# Patient Record
Sex: FEMALE | Race: WHITE | NOT HISPANIC OR LATINO | Employment: FULL TIME | URBAN - METROPOLITAN AREA
[De-identification: names, ages, dates, MRNs, and addresses within clinical notes are randomized per-mention and may not be internally consistent; named-entity substitution may affect disease eponyms.]

---

## 2018-03-19 DIAGNOSIS — Z12.31 ENCOUNTER FOR SCREENING MAMMOGRAM FOR MALIGNANT NEOPLASM OF BREAST: Primary | ICD-10-CM

## 2018-03-20 ENCOUNTER — OFFICE VISIT (OUTPATIENT)
Dept: GYNECOLOGY | Facility: CLINIC | Age: 43
End: 2018-03-20
Payer: COMMERCIAL

## 2018-03-20 VITALS
BODY MASS INDEX: 31.39 KG/M2 | WEIGHT: 188.4 LBS | DIASTOLIC BLOOD PRESSURE: 90 MMHG | HEIGHT: 65 IN | SYSTOLIC BLOOD PRESSURE: 160 MMHG

## 2018-03-20 DIAGNOSIS — Z01.419 ENCOUNTER FOR ROUTINE GYNECOLOGICAL EXAMINATION WITH PAPANICOLAOU SMEAR OF CERVIX: Primary | ICD-10-CM

## 2018-03-20 DIAGNOSIS — N92.6 IRREGULAR MENSES: ICD-10-CM

## 2018-03-20 DIAGNOSIS — Z12.4 ENCOUNTER FOR PAPANICOLAOU SMEAR FOR CERVICAL CANCER SCREENING: ICD-10-CM

## 2018-03-20 PROCEDURE — G0143 SCR C/V CYTO,THINLAYER,RESCR: HCPCS | Performed by: NURSE PRACTITIONER

## 2018-03-20 PROCEDURE — 99396 PREV VISIT EST AGE 40-64: CPT | Performed by: NURSE PRACTITIONER

## 2018-03-20 PROCEDURE — 87624 HPV HI-RISK TYP POOLED RSLT: CPT | Performed by: NURSE PRACTITIONER

## 2018-03-20 NOTE — PROGRESS NOTES
Areli Polanco is a 37 y o  female who presents for her annual exam  Periods are irregular, lasting various amount of days  Dysmenorrhea:none  Cyclic symptoms include none  No intermenstrual bleeding, spotting, or discharge  The pt  relates that over the past year her periods have been irregular again  She states that at times she will miss 2 months and then at other times she may bleed every 3 wks  She reports that usually her period is not heavy and she denies having any cramps  She states that her bleeding pattern is similar to what she experienced when she had a polyp in the past     Current contraception: condoms  History of abnormal Pap smear: no  Family history of breast cancer: yes   No past medical history on file  No family history on file  Past Surgical History:   Procedure Laterality Date    HYSTEROSCOPY W/ POLYPECTOMY      TOOTH EXTRACTION       Social History     Social History    Marital status: /Civil Union     Spouse name: N/A    Number of children: N/A    Years of education: N/A     Occupational History    Not on file  Social History Main Topics    Smoking status: Never Smoker    Smokeless tobacco: Never Used    Alcohol use Yes      Comment: SOCIAL    Drug use: No    Sexual activity: Yes     Partners: Male     Birth control/ protection: Condom     Other Topics Concern    Not on file     Social History Narrative    No narrative on file     No current outpatient prescriptions on file        Review of Systems  Constitutional :no fever, feels well, no tiredness, no recent weight gain or loss  Cardiovascular: no complaints of slow or fast heart beat, no chest pain, no palpitations  Respiratory: no complaints of shortness of shortness of breath, no COSTELLO  Breasts:no complaints of breast pain, breast lump, or nipple discharge  Gastrointestinal: no complaints of abdominal pain, constipation,nause, vomiting, or diarrhea or bloody stools  Genitourinary : no complaints of dysuria, incontinence, pelvic pain, dysmenorrhea,vaginal discharge  Reports irregular menses  Integumentary: no complaints of skin rash or lesion,itching or dry skin  Neurological: no complaints of headache,numbness, tingling, dizziness or fainting       Objective      /90 (BP Location: Right arm, Patient Position: Sitting, Cuff Size: Standard)   Ht 5' 4 67" (1 642 m)   Wt 85 5 kg (188 lb 6 4 oz)   LMP 01/11/2018   BMI 31 68 kg/m²     General:   appears stated age","cooperative","alert" normal mood and affect   Neck: Neck: normal, supple,trachea midline, no masses   Heart: regular rate and rhythm, S1, S2 normal, no murmur, click, rub or gallop   Lungs: clear to auscultation bilaterally   Breasts: Breast exam :normal, no dimpling or skin changes noted   Abdomen: soft, non-tender, without masses or organomegaly   Vulva: Normal , no lesions   Vagina: normal , no lesions or dryness   Urethra: normal   Cervix: Normal, no palpable masses A pap smear with HPV was done   Uterus: Normal , non-tender,not enlarged,no palpable masses   Adnexa: Normal, non-tender without fullness or masses                         Assessment   Normal GYN exam  Irregular menses       Plan      All questions answered  Await pap smear results  Breast self exam technique reviewed and patient encouraged to perform self-exam monthly  Dietary diary  Mammogram   Thin prep Pap smear  We discussed her irregular menses and possible causes including another polyp  She has been advised to RTO for further evaluation with a TVS/EMB/SIS and she is agreeable to this

## 2018-03-22 LAB — HPV RRNA GENITAL QL NAA+PROBE: NORMAL

## 2018-03-23 LAB
LAB AP GYN PRIMARY INTERPRETATION: NORMAL
Lab: NORMAL

## 2018-04-18 ENCOUNTER — ULTRASOUND (OUTPATIENT)
Dept: GYNECOLOGY | Facility: CLINIC | Age: 43
End: 2018-04-18
Payer: COMMERCIAL

## 2018-04-18 ENCOUNTER — OFFICE VISIT (OUTPATIENT)
Dept: GYNECOLOGY | Facility: CLINIC | Age: 43
End: 2018-04-18
Payer: COMMERCIAL

## 2018-04-18 DIAGNOSIS — N93.9 ABNORMAL UTERINE BLEEDING: Primary | ICD-10-CM

## 2018-04-18 LAB — SL AMB POCT URINE HCG: NORMAL

## 2018-04-18 PROCEDURE — 81025 URINE PREGNANCY TEST: CPT

## 2018-04-18 PROCEDURE — 88305 TISSUE EXAM BY PATHOLOGIST: CPT | Performed by: PATHOLOGY

## 2018-04-18 PROCEDURE — 76830 TRANSVAGINAL US NON-OB: CPT

## 2018-04-18 PROCEDURE — 58100 BIOPSY OF UTERUS LINING: CPT | Performed by: NURSE PRACTITIONER

## 2018-04-18 PROCEDURE — 58340 CATHETER FOR HYSTEROGRAPHY: CPT | Performed by: NURSE PRACTITIONER

## 2018-04-18 NOTE — PROGRESS NOTES
Assessment/Plan:    AUB     Diagnoses and all orders for this visit: TVS/EMB/SIS done   Polypectomy Surgery scheduled    Abnormal uterine bleeding        Subjective: The pt  presents today for a TVS/EMB/SIS     Patient ID: Hailey Vergara is a 37 y o  female  HPI The pt presents today for further evaluation of her irregular menses  The pt  Was recently seen for her yearly exam  At that visit she stated that she was again experiencing irregular periods similar to na episode she had a few years ago when she had a polyp  (see note of 3/20/18)  She was advised to RTO for further evaluation and to have a TVS/EMB/SIS  The following portions of the patient's history were reviewed and updated as appropriate: current medications, past medical history, past social history and past surgical history  Review of Systems   Constitutional: Negative  Genitourinary: Positive for menstrual problem and vaginal bleeding  Negative for vaginal discharge and vaginal pain  Psychiatric/Behavioral: Negative  Objective: BP : 160/90   Ht  : 5'4 6 Wt : 188 5   LMP:  3/29             Physical Exam   Constitutional: She is oriented to person, place, and time  She appears well-developed and well-nourished  Genitourinary:   Genitourinary Comments: See TVS/EMB/SIS report   Neurological: She is alert and oriented to person, place, and time  Psychiatric: She has a normal mood and affect  Her behavior is normal      Endometrial biopsy  Date/Time: 4/18/2018 3:57 PM  Performed by: Farhan Arenas  Authorized by: Farhan Arenas     Consent:     Consent obtained:  Verbal and written    Consent given by:  Patient    Procedural risks discussed:  Bleeding, failure rate, infection and repeat procedure    Patient questions answered: yes      Patient agrees, verbalizes understanding, and wants to proceed: yes      Instructions and paperwork completed: yes    Indication:     Indications:  Other disorder of menstruation and other abnormal bleeding from female genital tract    Pre-procedure:     Premeds:  Ibuprofen (600 mg )  Procedure:     Procedure: endometrial biopsy with Pipelle      A bivalve speculum was placed in the vagina: yes      Cervix cleaned and prepped: yes      The cervix was dilated: no      Uterus sounded: yes      Uterus sound depth (cm):  7    Specimen collected: specimen collected and sent to pathology      Patient tolerated procedure well with no complications: yes    Comments: The SIS was done and did show an endometrial polyp  PLAN: We discussed her AUB issues and today's SIS findngs which confirm the presence of an endometrial polyp  She is aware that this will need to be removed  Surgery (polypectomy) has been scheduled  She is aware that the EMB results is pending

## 2018-08-10 ENCOUNTER — PROCEDURE VISIT (OUTPATIENT)
Dept: GYNECOLOGY | Facility: CLINIC | Age: 43
End: 2018-08-10
Payer: COMMERCIAL

## 2018-08-10 VITALS
BODY MASS INDEX: 30.99 KG/M2 | DIASTOLIC BLOOD PRESSURE: 89 MMHG | SYSTOLIC BLOOD PRESSURE: 190 MMHG | WEIGHT: 186 LBS | HEIGHT: 65 IN

## 2018-08-10 DIAGNOSIS — N93.9 ABNORMAL UTERINE BLEEDING (AUB): Primary | ICD-10-CM

## 2018-08-10 DIAGNOSIS — N84.0 ENDOMETRIAL POLYP: ICD-10-CM

## 2018-08-10 PROCEDURE — 88305 TISSUE EXAM BY PATHOLOGIST: CPT | Performed by: PATHOLOGY

## 2018-08-10 PROCEDURE — 58558 HYSTEROSCOPY BIOPSY: CPT | Performed by: OBSTETRICS & GYNECOLOGY

## 2018-08-10 RX ORDER — IBUPROFEN 200 MG
200 TABLET ORAL 2 TIMES DAILY
COMMUNITY

## 2018-08-10 NOTE — PROGRESS NOTES
Procedures:    Preoperative diagnosis:  Abnormal uterine bleeding    Postoperative diagnosis:  Same    Procedure:  Hysteroscopy D&C polypectomy    Patient was taken to the operating suite and was placed in the dorsal lithotomy position with her legs in stirrups  Patient was placed under IV sedation  Exam under anesthesia revealed an anteverted uterus normal size and contour and freely mobile with no adnexal masses  A weighted speculum was placed in the vagina the anterior lip the cervix was grasped with single-tooth tenaculum  Prior to doing this the perineum and vagina were prepared with Betadine and draped in usual sterile manner  The uterus was sounded to 7 cm  The cervix was dilated using Hegar dilators  The hysteroscope was inserted into the endometrial cavity  There was noted be polypoid tissue in the posterior right lateral aspect of the uterine cavity  The remainder of  the uterine cavity appeared normal    Both ostia were well visualized  The hysteroscope was removed  A medium sharp curette was introduced into the endometrial cavity and endometrial curettings were obtained along with the polypoid tissue    Patient tolerated procedure well and was sent to the recovery area

## 2018-08-22 ENCOUNTER — OFFICE VISIT (OUTPATIENT)
Dept: GYNECOLOGY | Facility: CLINIC | Age: 43
End: 2018-08-22

## 2018-08-22 VITALS
BODY MASS INDEX: 30.99 KG/M2 | DIASTOLIC BLOOD PRESSURE: 76 MMHG | HEIGHT: 65 IN | WEIGHT: 186 LBS | SYSTOLIC BLOOD PRESSURE: 118 MMHG

## 2018-08-22 DIAGNOSIS — Z48.89 POSTOPERATIVE VISIT: Primary | ICD-10-CM

## 2018-08-22 PROCEDURE — 99024 POSTOP FOLLOW-UP VISIT: CPT | Performed by: OBSTETRICS & GYNECOLOGY

## 2018-08-22 NOTE — PROGRESS NOTES
Here for PO check s/p hysteroscopy D & C polypectomy  No complaints  Path: benign polyp    PE: uterus A/V normal size and contour, non tender   No adenxal masses    Normal PO check  Plan: RTO prn/annual

## 2018-10-11 ENCOUNTER — OFFICE VISIT (OUTPATIENT)
Dept: FAMILY MEDICINE CLINIC | Facility: CLINIC | Age: 43
End: 2018-10-11
Payer: COMMERCIAL

## 2018-10-11 ENCOUNTER — HOSPITAL ENCOUNTER (OUTPATIENT)
Dept: ULTRASOUND IMAGING | Facility: HOSPITAL | Age: 43
Discharge: HOME | End: 2018-10-11
Payer: COMMERCIAL

## 2018-10-11 VITALS
HEIGHT: 65 IN | WEIGHT: 183.06 LBS | RESPIRATION RATE: 17 BRPM | OXYGEN SATURATION: 99 % | SYSTOLIC BLOOD PRESSURE: 130 MMHG | DIASTOLIC BLOOD PRESSURE: 80 MMHG | BODY MASS INDEX: 30.5 KG/M2 | HEART RATE: 66 BPM | TEMPERATURE: 97.5 F

## 2018-10-11 DIAGNOSIS — M25.571 PAIN AND SWELLING OF ANKLE, RIGHT: ICD-10-CM

## 2018-10-11 DIAGNOSIS — M25.471 PAIN AND SWELLING OF ANKLE, RIGHT: ICD-10-CM

## 2018-10-11 DIAGNOSIS — M25.471 PAIN AND SWELLING OF ANKLE, RIGHT: Primary | ICD-10-CM

## 2018-10-11 DIAGNOSIS — M25.571 PAIN AND SWELLING OF ANKLE, RIGHT: Primary | ICD-10-CM

## 2018-10-11 PROCEDURE — 93971 EXTREMITY STUDY: CPT

## 2018-10-11 PROCEDURE — 99214 OFFICE O/P EST MOD 30 MIN: CPT | Performed by: FAMILY MEDICINE

## 2018-10-11 PROCEDURE — 93971 EXTREMITY STUDY: CPT | Performed by: SURGERY

## 2018-10-11 RX ORDER — SULFAMETHOXAZOLE AND TRIMETHOPRIM 800; 160 MG/1; MG/1
1 TABLET ORAL EVERY 12 HOURS SCHEDULED
Qty: 14 TABLET | Refills: 0 | Status: SHIPPED | OUTPATIENT
Start: 2018-10-11 | End: 2018-10-19 | Stop reason: ALTCHOICE

## 2018-10-11 NOTE — PROGRESS NOTES
Assessment/Plan:   1  Pain and swelling of ankle, right  Unclear as to the exact etiology of patient's symptoms today  She was educated on the differential possible causes including possible venous thromboembolism verses cellulitis  Will check stat venous duplex to rule out gross clot dot she was given a prescription for Bactrim to take as well  If any symptoms are worsening, she was advised to follow up immediately  - VAS lower limb venous duplex study, unilateral/limited; Future  - sulfamethoxazole-trimethoprim (BACTRIM DS) 800-160 mg per tablet; Take 1 tablet by mouth every 12 (twelve) hours for 7 days  Dispense: 14 tablet; Refill: 0     There are no diagnoses linked to this encounter  Subjective:    Chief Complaint   Patient presents with    Swelling, Painful and Redness     on R leg x 3 days  Has taken Ibuprofen with little relief  Pt has cc of tenderness and warm to the touch        Patient ID: La Nena Winslow is a 37 y o  female  Patient is a 47-year-old female presents today with CC of right ankle redness and swelling  She noticed this gradually  She states that she does have mild pain in this area  Denies any trauma to this area  She also denies any long flights or car trips recently  She states that she does exercise regularly  Review of Systems   Constitutional: Negative for activity change, chills, fatigue and fever  HENT: Negative for congestion, ear pain, sinus pressure and sore throat  Eyes: Negative for redness, itching and visual disturbance  Respiratory: Negative for cough and shortness of breath  Cardiovascular: Negative for chest pain and palpitations  Gastrointestinal: Negative for abdominal pain, diarrhea and nausea  Endocrine: Negative for cold intolerance and heat intolerance  Genitourinary: Negative for dysuria, flank pain and frequency  Musculoskeletal: Negative for arthralgias, back pain, gait problem and myalgias     Skin: Negative for color change  Allergic/Immunologic: Negative for environmental allergies  Neurological: Negative for dizziness, numbness and headaches  Psychiatric/Behavioral: Negative for behavioral problems and sleep disturbance  The following portions of the patient's history were reviewed and updated as appropriate : past family history, past medical history, past social history and past surgical history  Current Outpatient Prescriptions:     ibuprofen (MOTRIN) 200 mg tablet, Take by mouth every 6 (six) hours as needed for mild pain, Disp: , Rfl:     Objective:    Vitals:    10/11/18 1421   BP: 130/80   BP Location: Left arm   Patient Position: Sitting   Cuff Size: Large   Pulse: 66   Resp: 17   Temp: 97 5 °F (36 4 °C)   TempSrc: Tympanic   SpO2: 99%   Weight: 83 kg (183 lb 1 oz)   Height: 5' 4 96" (1 65 m)        Physical Exam   Constitutional: She is oriented to person, place, and time  She appears well-developed and well-nourished  HENT:   Head: Normocephalic and atraumatic  Nose: Nose normal    Mouth/Throat: No oropharyngeal exudate  Eyes: Pupils are equal, round, and reactive to light  Right eye exhibits no discharge  Left eye exhibits no discharge  Neck: Normal range of motion  Neck supple  No tracheal deviation present  Cardiovascular: Normal rate, regular rhythm and intact distal pulses  Exam reveals no gallop and no friction rub  No murmur heard  Pulses:       Dorsalis pedis pulses are 2+ on the right side, and 2+ on the left side  Posterior tibial pulses are 2+ on the right side, and 2+ on the left side  Pulmonary/Chest: Effort normal and breath sounds normal  No respiratory distress  She has no wheezes  She has no rales  Abdominal: Soft  Bowel sounds are normal  She exhibits no distension  There is no tenderness  There is no rebound and no guarding  Musculoskeletal: Normal range of motion  She exhibits no edema     Lymphadenopathy:        Head (right side): No submental and no submandibular adenopathy present  Head (left side): No submental and no submandibular adenopathy present  She has no cervical adenopathy  Right cervical: No superficial cervical, no deep cervical and no posterior cervical adenopathy present  Left cervical: No superficial cervical, no deep cervical and no posterior cervical adenopathy present  Neurological: She is alert and oriented to person, place, and time  No cranial nerve deficit or sensory deficit  Skin: Skin is warm, dry and intact  Psychiatric: Her speech is normal and behavior is normal  Judgment normal  Her mood appears not anxious  Cognition and memory are normal  She does not exhibit a depressed mood  Vitals reviewed

## 2018-10-19 ENCOUNTER — OFFICE VISIT (OUTPATIENT)
Dept: FAMILY MEDICINE CLINIC | Facility: CLINIC | Age: 43
End: 2018-10-19
Payer: COMMERCIAL

## 2018-10-19 VITALS
RESPIRATION RATE: 16 BRPM | HEIGHT: 64 IN | BODY MASS INDEX: 31.07 KG/M2 | HEART RATE: 58 BPM | TEMPERATURE: 98.7 F | SYSTOLIC BLOOD PRESSURE: 158 MMHG | WEIGHT: 182 LBS | OXYGEN SATURATION: 99 % | DIASTOLIC BLOOD PRESSURE: 88 MMHG

## 2018-10-19 DIAGNOSIS — L03.115 CELLULITIS OF RIGHT LOWER LEG: Primary | ICD-10-CM

## 2018-10-19 PROCEDURE — 3008F BODY MASS INDEX DOCD: CPT | Performed by: PHYSICIAN ASSISTANT

## 2018-10-19 PROCEDURE — 99213 OFFICE O/P EST LOW 20 MIN: CPT | Performed by: PHYSICIAN ASSISTANT

## 2018-10-19 PROCEDURE — 1036F TOBACCO NON-USER: CPT | Performed by: PHYSICIAN ASSISTANT

## 2018-10-19 RX ORDER — SULFAMETHOXAZOLE AND TRIMETHOPRIM 800; 160 MG/1; MG/1
1 TABLET ORAL EVERY 12 HOURS SCHEDULED
Qty: 20 TABLET | Refills: 0 | Status: SHIPPED | OUTPATIENT
Start: 2018-10-19 | End: 2018-10-29

## 2018-10-23 NOTE — PROGRESS NOTES
Assessment/Plan:         Diagnoses and all orders for this visit:    Cellulitis of right lower leg  -     sulfamethoxazole-trimethoprim (BACTRIM DS) 800-160 mg per tablet; Take 1 tablet by mouth every 12 (twelve) hours for 10 days Take with food  Discussed condition with pt  Her RLE cellulitis appears improved but not resolved  I will extend her oral abx course for another week and rec continued elevation, warm compresses  To F/U if condition does not resolve  Chief Complaint   Patient presents with    Follow-up     R leg still a little swollen finished abx  Subjective:      Patient ID: Cathie Long is a 37 y o  female  Pt presents for F/U from visit on 10/11/18  She was diagnosed with cellulitis of the right lower leg and was prescribed a 7 day course of Bactrim DS  She has completed it but is concerned that her condition has not completely resolved  She still reports a localized area of redness, mild swelling, and tenderness  Denies fever, chills, or other symptoms  The following portions of the patient's history were reviewed and updated as appropriate: allergies, current medications, past family history, past medical history, past social history, past surgical history and problem list     Review of Systems   Constitutional: Negative  Respiratory: Negative  Cardiovascular: Negative  Gastrointestinal: Negative  Genitourinary: Negative  Skin: Positive for color change (Localized redness, swelling, tenderness right lower leg above ankle)  Objective:      /88 (BP Location: Left arm, Patient Position: Sitting, Cuff Size: Adult)   Pulse 58   Temp 98 7 °F (37 1 °C) (Temporal)   Resp 16   Ht 5' 4" (1 626 m)   Wt 82 6 kg (182 lb)   LMP 09/27/2018   SpO2 99%   BMI 31 24 kg/m²          Physical Exam   Constitutional: She is oriented to person, place, and time  She appears well-developed and well-nourished  No distress     Neurological: She is alert and oriented to person, place, and time  Skin: There is erythema (Right anterior lower leg superior to the ankle with localized faint erythema, mild STS, and TTP  Margins are poorly demarcated  )  Psychiatric: She has a normal mood and affect  Vitals reviewed

## 2019-04-10 ENCOUNTER — OFFICE VISIT (OUTPATIENT)
Dept: GYNECOLOGY | Facility: CLINIC | Age: 44
End: 2019-04-10
Payer: COMMERCIAL

## 2019-04-10 VITALS
RESPIRATION RATE: 17 BRPM | HEART RATE: 62 BPM | BODY MASS INDEX: 32.65 KG/M2 | DIASTOLIC BLOOD PRESSURE: 100 MMHG | SYSTOLIC BLOOD PRESSURE: 150 MMHG | TEMPERATURE: 96.6 F | WEIGHT: 196 LBS | HEIGHT: 65 IN

## 2019-04-10 DIAGNOSIS — Z01.411 ENCOUNTER FOR GYNECOLOGICAL EXAMINATION (GENERAL) (ROUTINE) WITH ABNORMAL FINDINGS: Primary | ICD-10-CM

## 2019-04-10 DIAGNOSIS — Z12.31 ENCOUNTER FOR SCREENING MAMMOGRAM FOR MALIGNANT NEOPLASM OF BREAST: ICD-10-CM

## 2019-04-10 DIAGNOSIS — N93.9 ABNORMAL UTERINE BLEEDING: ICD-10-CM

## 2019-04-10 PROCEDURE — 99396 PREV VISIT EST AGE 40-64: CPT | Performed by: OBSTETRICS & GYNECOLOGY

## 2019-04-10 RX ORDER — LORATADINE 10 MG/1
10 TABLET ORAL DAILY PRN
COMMUNITY

## 2019-04-13 ENCOUNTER — APPOINTMENT (OUTPATIENT)
Dept: LAB | Facility: MEDICAL CENTER | Age: 44
End: 2019-04-13
Payer: COMMERCIAL

## 2019-04-13 DIAGNOSIS — N93.9 ABNORMAL UTERINE BLEEDING: ICD-10-CM

## 2019-04-13 LAB
ERYTHROCYTE [DISTWIDTH] IN BLOOD BY AUTOMATED COUNT: 12.6 % (ref 11.6–15.1)
ESTRADIOL SERPL-MCNC: 12 PG/ML
FSH SERPL-ACNC: 72.8 MIU/ML
HCT VFR BLD AUTO: 37.8 % (ref 34.8–46.1)
HGB BLD-MCNC: 12.2 G/DL (ref 11.5–15.4)
INSULIN SERPL-ACNC: 3 MU/L (ref 3–25)
LH SERPL-ACNC: 35.1 MIU/ML
MCH RBC QN AUTO: 31 PG (ref 26.8–34.3)
MCHC RBC AUTO-ENTMCNC: 32.3 G/DL (ref 31.4–37.4)
MCV RBC AUTO: 96 FL (ref 82–98)
PLATELET # BLD AUTO: 274 THOUSANDS/UL (ref 149–390)
PMV BLD AUTO: 10.2 FL (ref 8.9–12.7)
PROLACTIN SERPL-MCNC: 16.9 NG/ML
RBC # BLD AUTO: 3.94 MILLION/UL (ref 3.81–5.12)
TSH SERPL DL<=0.05 MIU/L-ACNC: 1.87 UIU/ML (ref 0.36–3.74)
WBC # BLD AUTO: 7.32 THOUSAND/UL (ref 4.31–10.16)

## 2019-04-13 PROCEDURE — 84146 ASSAY OF PROLACTIN: CPT

## 2019-04-13 PROCEDURE — 84403 ASSAY OF TOTAL TESTOSTERONE: CPT

## 2019-04-13 PROCEDURE — 85027 COMPLETE CBC AUTOMATED: CPT

## 2019-04-13 PROCEDURE — 82670 ASSAY OF TOTAL ESTRADIOL: CPT

## 2019-04-13 PROCEDURE — 36415 COLL VENOUS BLD VENIPUNCTURE: CPT

## 2019-04-13 PROCEDURE — 83001 ASSAY OF GONADOTROPIN (FSH): CPT

## 2019-04-13 PROCEDURE — 83525 ASSAY OF INSULIN: CPT

## 2019-04-13 PROCEDURE — 84443 ASSAY THYROID STIM HORMONE: CPT

## 2019-04-13 PROCEDURE — 84402 ASSAY OF FREE TESTOSTERONE: CPT

## 2019-04-13 PROCEDURE — 83002 ASSAY OF GONADOTROPIN (LH): CPT

## 2019-04-16 ENCOUNTER — TELEPHONE (OUTPATIENT)
Dept: GYNECOLOGY | Facility: CLINIC | Age: 44
End: 2019-04-16

## 2019-04-16 LAB
TESTOST FREE SERPL-MCNC: 4 PG/ML (ref 0–4.2)
TESTOST SERPL-MCNC: 23 NG/DL (ref 8–48)

## 2019-04-17 ENCOUNTER — HOSPITAL ENCOUNTER (OUTPATIENT)
Dept: ULTRASOUND IMAGING | Facility: HOSPITAL | Age: 44
Discharge: HOME | End: 2019-04-17
Payer: COMMERCIAL

## 2019-04-17 ENCOUNTER — TELEPHONE (OUTPATIENT)
Dept: FAMILY MEDICINE CLINIC | Facility: CLINIC | Age: 44
End: 2019-04-17

## 2019-04-17 DIAGNOSIS — N93.9 ABNORMAL UTERINE BLEEDING: ICD-10-CM

## 2019-04-17 PROCEDURE — 76830 TRANSVAGINAL US NON-OB: CPT

## 2019-04-17 PROCEDURE — 76856 US EXAM PELVIC COMPLETE: CPT

## 2019-04-20 DIAGNOSIS — Z01.411 ENCOUNTER FOR GYNECOLOGICAL EXAMINATION (GENERAL) (ROUTINE) WITH ABNORMAL FINDINGS: ICD-10-CM

## 2019-04-24 ENCOUNTER — OFFICE VISIT (OUTPATIENT)
Dept: GYNECOLOGY | Facility: CLINIC | Age: 44
End: 2019-04-24
Payer: COMMERCIAL

## 2019-04-24 VITALS
WEIGHT: 196 LBS | BODY MASS INDEX: 32.62 KG/M2 | SYSTOLIC BLOOD PRESSURE: 150 MMHG | HEART RATE: 62 BPM | DIASTOLIC BLOOD PRESSURE: 100 MMHG

## 2019-04-24 DIAGNOSIS — N95.1 PERIMENOPAUSE: Primary | ICD-10-CM

## 2019-04-24 PROCEDURE — 99214 OFFICE O/P EST MOD 30 MIN: CPT | Performed by: OBSTETRICS & GYNECOLOGY

## 2019-04-25 ENCOUNTER — TELEPHONE (OUTPATIENT)
Dept: GYNECOLOGY | Facility: CLINIC | Age: 44
End: 2019-04-25

## 2020-06-01 ENCOUNTER — ANNUAL EXAM (OUTPATIENT)
Dept: GYNECOLOGY | Facility: CLINIC | Age: 45
End: 2020-06-01
Payer: COMMERCIAL

## 2020-06-01 VITALS
BODY MASS INDEX: 34.32 KG/M2 | HEIGHT: 65 IN | WEIGHT: 206 LBS | HEART RATE: 57 BPM | DIASTOLIC BLOOD PRESSURE: 92 MMHG | SYSTOLIC BLOOD PRESSURE: 142 MMHG

## 2020-06-01 DIAGNOSIS — N64.4 MASTODYNIA OF RIGHT BREAST: Primary | ICD-10-CM

## 2020-06-01 DIAGNOSIS — N64.4 MASTODYNIA OF RIGHT BREAST: ICD-10-CM

## 2020-06-01 DIAGNOSIS — Z01.411 ENCOUNTER FOR GYNECOLOGICAL EXAMINATION (GENERAL) (ROUTINE) WITH ABNORMAL FINDINGS: Primary | ICD-10-CM

## 2020-06-01 DIAGNOSIS — N63.10 LUMP OF RIGHT BREAST: ICD-10-CM

## 2020-06-01 DIAGNOSIS — Z12.31 ENCOUNTER FOR SCREENING MAMMOGRAM FOR MALIGNANT NEOPLASM OF BREAST: Primary | ICD-10-CM

## 2020-06-01 DIAGNOSIS — Z12.31 ENCOUNTER FOR SCREENING MAMMOGRAM FOR MALIGNANT NEOPLASM OF BREAST: ICD-10-CM

## 2020-06-01 PROCEDURE — S0612 ANNUAL GYNECOLOGICAL EXAMINA: HCPCS | Performed by: OBSTETRICS & GYNECOLOGY

## 2020-06-01 PROCEDURE — 3008F BODY MASS INDEX DOCD: CPT | Performed by: OBSTETRICS & GYNECOLOGY

## 2020-06-19 ENCOUNTER — HOSPITAL ENCOUNTER (OUTPATIENT)
Dept: ULTRASOUND IMAGING | Facility: CLINIC | Age: 45
Discharge: HOME | End: 2020-06-19
Payer: COMMERCIAL

## 2020-06-19 ENCOUNTER — HOSPITAL ENCOUNTER (OUTPATIENT)
Dept: MAMMOGRAPHY | Facility: CLINIC | Age: 45
Discharge: HOME | End: 2020-06-19
Payer: COMMERCIAL

## 2020-06-19 ENCOUNTER — TELEPHONE (OUTPATIENT)
Dept: GYNECOLOGY | Facility: CLINIC | Age: 45
End: 2020-06-19

## 2020-06-19 VITALS — BODY MASS INDEX: 35.17 KG/M2 | WEIGHT: 206 LBS | HEIGHT: 64 IN

## 2020-06-19 DIAGNOSIS — N63.10 LUMP OF RIGHT BREAST: ICD-10-CM

## 2020-06-19 DIAGNOSIS — N64.4 MASTODYNIA OF RIGHT BREAST: ICD-10-CM

## 2020-06-19 DIAGNOSIS — N64.4 MASTODYNIA: ICD-10-CM

## 2020-06-19 PROCEDURE — G0279 TOMOSYNTHESIS, MAMMO: HCPCS

## 2020-06-19 PROCEDURE — 77066 DX MAMMO INCL CAD BI: CPT

## 2020-06-19 PROCEDURE — 76642 ULTRASOUND BREAST LIMITED: CPT

## 2020-06-22 ENCOUNTER — TELEPHONE (OUTPATIENT)
Dept: GYNECOLOGY | Facility: CLINIC | Age: 45
End: 2020-06-22

## 2020-09-23 ENCOUNTER — TELEPHONE (OUTPATIENT)
Dept: GYNECOLOGY | Facility: CLINIC | Age: 45
End: 2020-09-23

## 2021-11-17 ENCOUNTER — ANNUAL EXAM (OUTPATIENT)
Dept: GYNECOLOGY | Facility: CLINIC | Age: 46
End: 2021-11-17
Payer: COMMERCIAL

## 2021-11-17 VITALS
SYSTOLIC BLOOD PRESSURE: 184 MMHG | HEIGHT: 64 IN | WEIGHT: 215.2 LBS | DIASTOLIC BLOOD PRESSURE: 102 MMHG | BODY MASS INDEX: 36.74 KG/M2

## 2021-11-17 DIAGNOSIS — Z12.31 SCREENING MAMMOGRAM FOR BREAST CANCER: ICD-10-CM

## 2021-11-17 DIAGNOSIS — R03.0 ELEVATED BP WITHOUT DIAGNOSIS OF HYPERTENSION: ICD-10-CM

## 2021-11-17 DIAGNOSIS — N32.81 OAB (OVERACTIVE BLADDER): ICD-10-CM

## 2021-11-17 DIAGNOSIS — Z01.411 ENCOUNTER FOR GYNECOLOGICAL EXAMINATION (GENERAL) (ROUTINE) WITH ABNORMAL FINDINGS: Primary | ICD-10-CM

## 2021-11-17 DIAGNOSIS — Z12.4 PAP SMEAR FOR CERVICAL CANCER SCREENING: ICD-10-CM

## 2021-11-17 PROCEDURE — S0612 ANNUAL GYNECOLOGICAL EXAMINA: HCPCS | Performed by: OBSTETRICS & GYNECOLOGY

## 2021-11-17 PROCEDURE — G0476 HPV COMBO ASSAY CA SCREEN: HCPCS | Performed by: OBSTETRICS & GYNECOLOGY

## 2021-11-17 PROCEDURE — G0145 SCR C/V CYTO,THINLAYER,RESCR: HCPCS | Performed by: OBSTETRICS & GYNECOLOGY

## 2021-11-20 LAB
HPV HR 12 DNA CVX QL NAA+PROBE: NEGATIVE
HPV16 DNA CVX QL NAA+PROBE: NEGATIVE
HPV18 DNA CVX QL NAA+PROBE: NEGATIVE

## 2021-11-23 LAB
LAB AP GYN PRIMARY INTERPRETATION: NORMAL
Lab: NORMAL

## 2021-11-24 ENCOUNTER — DOCUMENTATION (OUTPATIENT)
Dept: GYNECOLOGY | Facility: CLINIC | Age: 46
End: 2021-11-24

## 2021-11-26 ENCOUNTER — HOSPITAL ENCOUNTER (OUTPATIENT)
Dept: RADIOLOGY | Facility: MEDICAL CENTER | Age: 46
Discharge: HOME | End: 2021-11-26
Payer: COMMERCIAL

## 2021-11-26 VITALS — WEIGHT: 215 LBS | HEIGHT: 64 IN | BODY MASS INDEX: 36.7 KG/M2

## 2021-11-26 DIAGNOSIS — Z12.31 SCREENING MAMMOGRAM FOR BREAST CANCER: ICD-10-CM

## 2021-11-26 PROCEDURE — 77063 BREAST TOMOSYNTHESIS BI: CPT

## 2021-11-26 PROCEDURE — 77067 SCR MAMMO BI INCL CAD: CPT

## 2022-02-10 ENCOUNTER — RA CDI HCC (OUTPATIENT)
Dept: OTHER | Facility: HOSPITAL | Age: 47
End: 2022-02-10

## 2022-02-10 NOTE — PROGRESS NOTES
[facility] 73  coding opportunities       Chart reviewed, no opportunity found: CHART REVIEWED, NO OPPORTUNITY FOUND               Patients insurance company: [employer] (Medicare Advantage and Commercial)

## 2022-02-14 NOTE — PROGRESS NOTES
Assessment/Plan:  Problem List Items Addressed This Visit        Respiratory    Seasonal allergic rhinitis     Stable on Claritin 10 mg daily p r n     Avoid decongestants  Cardiovascular and Mediastinum    Primary hypertension     New  Uncontrolled  Start Losartan 50mg QD  Check blood pressure outside of office  Recommend lifestyle modifications  Relevant Medications    losartan (COZAAR) 50 mg tablet    Other Relevant Orders    Echo complete w/ contrast if indicated    CBC and differential    Comprehensive metabolic panel       Genitourinary    OAB (overactive bladder)     Stable without meds  Management per Gyne  Other    Class 2 severe obesity with serious comorbidity and body mass index (BMI) of 36 0 to 36 9 in [facility])     Worsening  Recommend lifestyle modifications  Relevant Orders    US right upper quadrant    POCT urine HCG (Completed)      Other Visit Diagnoses     Annual physical exam    -  Primary    RUQ abdominal pain        Relevant Orders    US right upper quadrant    POCT urine HCG (Completed) - Negative    R/O Gallbladder Pathology          Heart murmur        Relevant Orders    Echo complete w/ contrast if indicated    Screening for HIV (human immunodeficiency virus)        Relevant Orders    HIV 1/2 Antigen/Antibody (4th Generation) w Reflex [facility]    Need for hepatitis C screening test        Relevant Orders    Hepatitis C antibody    Screening for colorectal cancer        Relevant Orders    Cologuard    Screening for diabetes mellitus        Relevant Orders    Hemoglobin A1C    Screening for cardiovascular condition        Relevant Orders    CBC and differential    Comprehensive metabolic panel    Lipid panel    LDL cholesterol, direct    Obesity (BMI 35 0-39 9 without comorbidity)        Relevant Orders    US right upper quadrant    POCT urine HCG (Completed)    TSH, 3rd generation with Free T4 reflex    Vitamin D 25 hydroxy    Severe obesity (BMI 35 0-39  9) with comorbidity ([facility] [address] )        Relevant Orders    US right upper quadrant    POCT urine HCG (Completed)    TSH, 3rd generation with Free T4 reflex    Vitamin D 25 hydroxy    BMI 36 0-36 9,adult        Relevant Orders    US right upper quadrant    POCT urine HCG (Completed)    Vitamin D 25 hydroxy    Myalgia        Relevant Orders    Vitamin D 25 hydroxy           Return in about 6 weeks (around 3/29/2022) for F/U HTN, RUQ Abd Pain, Murmur, Labs  Future Appointments   Date Time Provider Ramiro Brianna   3/31/2022  8:00 AM Ida Rodrigez, DO [facility]        Subjective:     Tete Mayo is a 50 y o  female who presents today as a new patient for her medical conditions  New Patient    Previous PCP:  Dr Rajat Sr at [facility]  Reason for Transfer:  Location  Last seen by previous PCP:  10/19/18  Last Labs:  4/13/19  Last Physical:  [employer]  Medical Records Requested:  No      HPI:  Chief Complaint   Patient presents with    Physical Exam     EST CARE- HIGH BP READING    Medication Refill     NONE    Labs Only     NO LAB    HM     DECLINED TDAP, OK FOR HEP C HIV     -- Above per clinical staff and reviewed  --      HPI      Today:      Controlled Substance Review    [place] PDMP or [place]  reviewed: No red flags were identified; safe to proceed with prescription  Obesity - Watching diet  She not adding salt to food  Does intermittent fasting during the work week  +Exercise - Gym  -  / Weights / [employer] / Class for 1 hour, 6 times per week  HTN -  /102 on 11/1721 at [facility]  BP check outside of office on arm cuff 160's/70/s  Heart Murmur - [place] by Gyn at appt on 11/17/21  No Echo previously  OAB - Management per AYSE Shannon  Next appt 11/22  No meds  AR - Stable on Claritin PRN  Reviewed:  Labs 4/13/19, Gyn 11/17/21    Sees Gyn AYSE Shannon at [facility] yearly  Next appt   No CRC previously  Sees Dentist q6 months  Sees Optho q2 years  PHQ-2/9 Depression Screening    Little interest or pleasure in doing things: 0 - not at all  Feeling down, depressed, or hopeless: 1 - several days  Trouble falling or staying asleep, or sleeping too much: 0 - not at all  Feeling tired or having little energy: 0 - not at all  Poor appetite or overeatin - not at all  Feeling bad about yourself - or that you are a failure or have let yourself or your family down: 0 - not at all  Trouble concentrating on things, such as reading the newspaper or watching television: 0 - not at all  Moving or speaking so slowly that other people could have noticed  Or the opposite - being so fidgety or restless that you have been moving around a lot more than usual: 0 - not at all  Thoughts that you would be better off dead, or of hurting yourself in some way: 0 - not at all  PHQ-2 Score: 1  PHQ-2 Interpretation: Negative depression screen  PHQ-9 Score: 1   PHQ-9 Interpretation: No or Minimal depression            ANA-7 Flowsheet Screening      Most Recent Value   Over the last 2 weeks, how often have you been bothered by any of the following problems? Feeling nervous, anxious, or on edge 1   Not being able to stop or control worrying 0   Worrying too much about different things 0   Trouble relaxing 0   Being so restless that it is hard to sit still 0   Becoming easily annoyed or irritable 0   Feeling afraid as if something awful might happen 0   ANA-7 Total Score 1        MDQ:  0, Asynchronous, No Problem    The following portions of the patient's history were reviewed and updated as appropriate: allergies, current medications, past family history, past medical history, past social history, past surgical history and problem list       Review of Systems   Constitutional: Negative for appetite change, chills, diaphoresis, fatigue and fever     Respiratory: Negative for chest tightness and shortness of breath  Cardiovascular: Positive for palpitations (1-2 times per month)  Negative for chest pain  Gastrointestinal: Positive for abdominal pain (RUQ x 1 year)  Negative for blood in stool, diarrhea, nausea and vomiting  Genitourinary: Negative for dysuria  Current Outpatient Medications   Medication Sig Dispense Refill    ibuprofen (MOTRIN) 200 mg tablet Take 200 mg by mouth 2 (two) times a day        loratadine (CLARITIN) 10 mg tablet Take 10 mg by mouth daily as needed        losartan (COZAAR) 50 mg tablet Take 1 tablet (50 mg total) by mouth daily 30 tablet 1     No current facility-administered medications for this visit  Objective:  /88   Pulse 83   Temp 97 5 °F (36 4 °C)   Resp 16   Ht 5' 4 69" (1 643 m)   Wt 99 8 kg (220 lb)   LMP 09/24/2021 (Exact Date)   SpO2 100%   Breastfeeding No   BMI 36 97 kg/m²    Wt Readings from Last 3 Encounters:   02/15/22 99 8 kg (220 lb)   11/26/21 97 5 kg (215 lb)   11/17/21 97 6 kg (215 lb 3 2 oz)      BP Readings from Last 3 Encounters:   02/15/22 160/88   11/17/21 (!) 184/102   06/01/20 142/92          Physical Exam  Vitals and nursing note reviewed  Constitutional:       Appearance: Normal appearance  She is well-developed  She is obese  HENT:      Head: Normocephalic and atraumatic  Right Ear: Tympanic membrane, ear canal and external ear normal       Left Ear: Tympanic membrane, ear canal and external ear normal       Nose: Nose normal       Right Sinus: No maxillary sinus tenderness or frontal sinus tenderness  Left Sinus: No maxillary sinus tenderness or frontal sinus tenderness  Mouth/Throat:      Mouth: Mucous membranes are moist       Pharynx: Oropharynx is clear  Uvula midline  Tonsils: No tonsillar exudate  Eyes:      Extraocular Movements: Extraocular movements intact        Conjunctiva/sclera: Conjunctivae normal       Pupils: Pupils are equal, round, and reactive to light  Cardiovascular:      Rate and Rhythm: Normal rate and regular rhythm  Pulses: Normal pulses  Heart sounds: Murmur heard  Systolic murmur is present with a grade of 1/6  Pulmonary:      Effort: Pulmonary effort is normal       Breath sounds: Normal breath sounds  Abdominal:      General: Abdomen is flat  Bowel sounds are normal  There is no distension  Palpations: Abdomen is soft  There is no mass  Tenderness: There is abdominal tenderness in the right upper quadrant  There is no guarding or rebound  Positive signs include Ruiz's sign  Musculoskeletal:         General: No swelling or tenderness  Cervical back: Neck supple  Right lower leg: No edema  Left lower leg: No edema  Lymphadenopathy:      Cervical: No cervical adenopathy  Skin:     Findings: No rash  Neurological:      General: No focal deficit present  Mental Status: She is alert and oriented to person, place, and time  Psychiatric:         Attention and Perception: Attention and perception normal          Mood and Affect: Mood is anxious  Speech: Speech normal          Behavior: Behavior normal  Behavior is cooperative  Thought Content: Thought content normal          Cognition and Memory: Cognition and memory normal          Judgment: Judgment normal          Lab Results:      Lab Results   Component Value Date    WBC 7 32 04/13/2019    HGB 12 2 04/13/2019    HCT 37 8 04/13/2019     04/13/2019     No results found for: URICACID  Invalid input(s): BASENAME Vitamin D    No results found  POCT Labs      BMI Counseling: Body mass index is 36 97 kg/m²  The BMI is above normal  Nutrition recommendations include encouraging healthy choices of fruits and vegetables  Exercise recommendations include exercising 3-5 times per week  No pharmacotherapy was ordered  Rationale for BMI follow-up plan is due to patient being overweight or obese

## 2022-02-15 ENCOUNTER — OFFICE VISIT (OUTPATIENT)
Dept: FAMILY MEDICINE CLINIC | Facility: CLINIC | Age: 47
End: 2022-02-15
Payer: COMMERCIAL

## 2022-02-15 VITALS
WEIGHT: 220 LBS | RESPIRATION RATE: 16 BRPM | SYSTOLIC BLOOD PRESSURE: 160 MMHG | OXYGEN SATURATION: 100 % | BODY MASS INDEX: 36.65 KG/M2 | TEMPERATURE: 97.5 F | HEART RATE: 83 BPM | HEIGHT: 65 IN | DIASTOLIC BLOOD PRESSURE: 88 MMHG

## 2022-02-15 DIAGNOSIS — R01.1 HEART MURMUR: ICD-10-CM

## 2022-02-15 DIAGNOSIS — K76.0 FATTY LIVER: ICD-10-CM

## 2022-02-15 DIAGNOSIS — M79.10 MYALGIA: ICD-10-CM

## 2022-02-15 DIAGNOSIS — Z11.59 NEED FOR HEPATITIS C SCREENING TEST: ICD-10-CM

## 2022-02-15 DIAGNOSIS — I34.0 MITRAL VALVE INSUFFICIENCY, UNSPECIFIED ETIOLOGY: ICD-10-CM

## 2022-02-15 DIAGNOSIS — J30.2 SEASONAL ALLERGIC RHINITIS, UNSPECIFIED TRIGGER: ICD-10-CM

## 2022-02-15 DIAGNOSIS — Z12.12 SCREENING FOR COLORECTAL CANCER: ICD-10-CM

## 2022-02-15 DIAGNOSIS — I07.1 TRICUSPID VALVE INSUFFICIENCY, UNSPECIFIED ETIOLOGY: ICD-10-CM

## 2022-02-15 DIAGNOSIS — N32.81 OAB (OVERACTIVE BLADDER): ICD-10-CM

## 2022-02-15 DIAGNOSIS — I10 PRIMARY HYPERTENSION: ICD-10-CM

## 2022-02-15 DIAGNOSIS — R10.11 RUQ ABDOMINAL PAIN: ICD-10-CM

## 2022-02-15 DIAGNOSIS — E66.01 SEVERE OBESITY (BMI 35.0-39.9) WITH COMORBIDITY (HCC): ICD-10-CM

## 2022-02-15 DIAGNOSIS — E66.01 CLASS 2 SEVERE OBESITY WITH SERIOUS COMORBIDITY AND BODY MASS INDEX (BMI) OF 36.0 TO 36.9 IN ADULT, UNSPECIFIED OBESITY TYPE (HCC): ICD-10-CM

## 2022-02-15 DIAGNOSIS — Z13.1 SCREENING FOR DIABETES MELLITUS: ICD-10-CM

## 2022-02-15 DIAGNOSIS — E66.9 OBESITY (BMI 35.0-39.9 WITHOUT COMORBIDITY): ICD-10-CM

## 2022-02-15 DIAGNOSIS — I51.7 LEFT ATRIAL DILATATION: ICD-10-CM

## 2022-02-15 DIAGNOSIS — R16.0 HEPATOMEGALY: ICD-10-CM

## 2022-02-15 DIAGNOSIS — Z11.4 SCREENING FOR HIV (HUMAN IMMUNODEFICIENCY VIRUS): ICD-10-CM

## 2022-02-15 DIAGNOSIS — Z00.00 ANNUAL PHYSICAL EXAM: Primary | ICD-10-CM

## 2022-02-15 DIAGNOSIS — Z12.11 SCREENING FOR COLORECTAL CANCER: ICD-10-CM

## 2022-02-15 DIAGNOSIS — Z13.6 SCREENING FOR CARDIOVASCULAR CONDITION: ICD-10-CM

## 2022-02-15 PROBLEM — E66.812 CLASS 2 SEVERE OBESITY WITH SERIOUS COMORBIDITY AND BODY MASS INDEX (BMI) OF 36.0 TO 36.9 IN ADULT (HCC): Status: ACTIVE | Noted: 2022-02-15

## 2022-02-15 LAB — SL AMB POCT URINE HCG: NEGATIVE

## 2022-02-15 PROCEDURE — 99203 OFFICE O/P NEW LOW 30 MIN: CPT | Performed by: FAMILY MEDICINE

## 2022-02-15 PROCEDURE — 81025 URINE PREGNANCY TEST: CPT | Performed by: FAMILY MEDICINE

## 2022-02-15 PROCEDURE — 3725F SCREEN DEPRESSION PERFORMED: CPT | Performed by: FAMILY MEDICINE

## 2022-02-15 PROCEDURE — 1036F TOBACCO NON-USER: CPT | Performed by: FAMILY MEDICINE

## 2022-02-15 PROCEDURE — 3008F BODY MASS INDEX DOCD: CPT | Performed by: FAMILY MEDICINE

## 2022-02-15 PROCEDURE — 99386 PREV VISIT NEW AGE 40-64: CPT | Performed by: FAMILY MEDICINE

## 2022-02-15 RX ORDER — LOSARTAN POTASSIUM 50 MG/1
50 TABLET ORAL DAILY
Qty: 30 TABLET | Refills: 1 | Status: SHIPPED | OUTPATIENT
Start: 2022-02-15 | End: 2022-03-31

## 2022-02-15 NOTE — PATIENT INSTRUCTIONS
Please contact your insurance if you are uncertain of coverage for plan of care items  Your insurance may not cover the cost of your Vitamin D blood test, which is approximately $65-70  Please notify the lab prior to blood draw if you would like to decline this test         Check blood pressure outside of office  Proper Blood Pressure Cuff Use:      Sit in a chair with back support, with both feet flat on the floor, for at least 5 minutes prior to measuring blood pressure  Take 3 different blood pressure readings  The 1st blood pressure reading will usually be the highest   Use an average of these 3 readings as your working blood pressure  For patient's 72years old and older without kidney disease, a blood pressure of less than 150/90 is acceptable  For other age groups, normal blood pressures is considered to be 120's/80's, borderline blood pressure 135/85, and high blood pressure is consistently 140's/90'ss  Please schedule appointment to evaluate blood pressure if your blood pressure readings are consistently high  Wellness Visit for Adults   AMBULATORY CARE:   A wellness visit  is when you see your healthcare provider to get screened for health problems  Your healthcare provider will also give you advice on how to stay healthy  Write down your questions so you remember to ask them  Ask your healthcare provider how often you should have a wellness visit  What happens at a wellness visit:  Your healthcare provider will ask about your health, and your family history of health problems  This includes high blood pressure, heart disease, and cancer  He or she will ask if you have symptoms that concern you, if you smoke, and about your mood  You may also be asked about your intake of medicines, supplements, food, and alcohol  Any of the following may be done:  · Your weight  will be checked  Your height may also be checked so your body mass index (BMI) can be calculated   Your BMI shows if you are at a healthy weight  · Your blood pressure  and heart rate will be checked  Your temperature may also be checked  · Blood and urine tests  may be done  Blood tests may be done to check your cholesterol levels  Abnormal cholesterol levels increase your risk for heart disease and stroke  You may also need a blood or urine test to check for diabetes if you are at increased risk  Urine tests may be done to look for signs of an infection or kidney disease  · A physical exam  includes checking your heartbeat and lungs with a stethoscope  Your healthcare provider may also check your skin to look for sun damage  · Screening tests  may be recommended  A screening test is done to check for diseases that may not cause symptoms  The screening tests you may need depend on your age, gender, family history, and lifestyle habits  For example, colorectal screening may be recommended if you are 48years old or older  Screening tests you need if you are a woman:   · A Pap smear  is used to screen for cervical cancer  Pap smears are usually done every 3 to 5 years depending on your age  You may need them more often if you have had abnormal Pap smear test results in the past  Ask your healthcare provider how often you should have a Pap smear  · A mammogram  is an x-ray of your breasts to screen for breast cancer  Experts recommend mammograms every 2 years starting at age 48 years  You may need a mammogram at age 52 years or younger if you have an increased risk for breast cancer  Talk to your healthcare provider about when you should start having mammograms and how often you need them  Vaccines you may need:   · Get an influenza vaccine  every year  The influenza vaccine protects you from the flu  Several types of viruses cause the flu  The viruses change over time, so new vaccines are made each year  · Get a tetanus-diphtheria (Td) booster vaccine  every 10 years   This vaccine protects you against tetanus and diphtheria  Tetanus is a severe infection that may cause painful muscle spasms and lockjaw  Diphtheria is a severe bacterial infection that causes a thick covering in the back of your mouth and throat  · Get a human papillomavirus (HPV) vaccine  if you are female and aged 23 to 32 or male 23 to 24 and never received it  This vaccine protects you from HPV infection  HPV is the most common infection spread by sexual contact  HPV may also cause vaginal, penile, and anal cancers  · Get a pneumococcal vaccine  if you are aged 72 years or older  The pneumococcal vaccine is an injection given to protect you from pneumococcal disease  Pneumococcal disease is an infection caused by pneumococcal bacteria  The infection may cause pneumonia, meningitis, or an ear infection  · Get a shingles vaccine  if you are 60 or older, even if you have had shingles before  The shingles vaccine is an injection to protect you from the varicella-zoster virus  This is the same virus that causes chickenpox  Shingles is a painful rash that develops in people who had chickenpox or have been exposed to the virus  How to eat healthy:  My Plate is a model for planning healthy meals  It shows the types and amounts of foods that should go on your plate  Fruits and vegetables make up about half of your plate, and grains and protein make up the other half  A serving of dairy is included on the side of your plate  The amount of calories and serving sizes you need depends on your age, gender, weight, and height  Examples of healthy foods are listed below:  · Eat a variety of vegetables  such as dark green, red, and orange vegetables  You can also include canned vegetables low in sodium (salt) and frozen vegetables without added butter or sauces  · Eat a variety of fresh fruits , canned fruit in 100% juice, frozen fruit, and dried fruit  · Include whole grains  At least half of the grains you eat should be whole grains   Examples include whole-wheat bread, wheat pasta, brown rice, and whole-grain cereals such as oatmeal     · Eat a variety of protein foods such as seafood (fish and shellfish), lean meat, and poultry without skin (turkey and chicken)  Examples of lean meats include pork leg, shoulder, or tenderloin, and beef round, sirloin, tenderloin, and extra lean ground beef  Other protein foods include eggs and egg substitutes, beans, peas, soy products, nuts, and seeds  · Choose low-fat dairy products such as skim or 1% milk or low-fat yogurt, cheese, and cottage cheese  · Limit unhealthy fats  such as butter, hard margarine, and shortening  Exercise:  Exercise at least 30 minutes per day on most days of the week  Some examples of exercise include walking, biking, dancing, and swimming  You can also fit in more physical activity by taking the stairs instead of the elevator or parking farther away from stores  Include muscle strengthening activities 2 days each week  Regular exercise provides many health benefits  It helps you manage your weight, and decreases your risk for type 2 diabetes, heart disease, stroke, and high blood pressure  Exercise can also help improve your mood  Ask your healthcare provider about the best exercise plan for you  General health and safety guidelines:   · Do not smoke  Nicotine and other chemicals in cigarettes and cigars can cause lung damage  Ask your healthcare provider for information if you currently smoke and need help to quit  E-cigarettes or smokeless tobacco still contain nicotine  Talk to your healthcare provider before you use these products  · Limit alcohol  A drink of alcohol is 12 ounces of beer, 5 ounces of wine, or 1½ ounces of liquor  · Lose weight, if needed  Being overweight increases your risk of certain health conditions  These include heart disease, high blood pressure, type 2 diabetes, and certain types of cancer  · Protect your skin    Do not sunbathe or use tanning beds  Use sunscreen with a SPF 15 or higher  Apply sunscreen at least 15 minutes before you go outside  Reapply sunscreen every 2 hours  Wear protective clothing, hats, and sunglasses when you are outside  · Drive safely  Always wear your seatbelt  Make sure everyone in your car wears a seatbelt  A seatbelt can save your life if you are in an accident  Do not use your cell phone when you are driving  This could distract you and cause an accident  Pull over if you need to make a call or send a text message  · Practice safe sex  Use latex condoms if are sexually active and have more than one partner  Your healthcare provider may recommend screening tests for sexually transmitted infections (STIs)  · Wear helmets, lifejackets, and protective gear  Always wear a helmet when you ride a bike or motorcycle, go skiing, or play sports that could cause a head injury  Wear protective equipment when you play sports  Wear a lifejacket when you are on a boat or doing water sports  © Copyright [employer] 2021 Information is for End User's use only and may not be sold, redistributed or otherwise used for commercial purposes  All illustrations and images included in CareNotes® are the copyrighted property of A D A M , Inc  or [facility] [employer]   The above information is an  only  It is not intended as medical advice for individual conditions or treatments  Talk to your doctor, nurse or pharmacist before following any medical regimen to see if it is safe and effective for you  Obesity   AMBULATORY CARE:   Obesity  is when your body mass index (BMI) is greater than 30  Your healthcare provider will use your height and weight to measure your BMI  The risks of obesity include  many health problems, including injuries or physical disability   You may need tests to check for the following:  · Diabetes    · High blood pressure or high cholesterol    · Heart disease    · Stroke    · Gallbladder or liver disease    · Cancer of the colon, breast, prostate, liver, or kidney    · Sleep apnea    · Arthritis or gout    Seek care immediately if:   · You have a severe headache, confusion, or difficulty speaking  · You have weakness on one side of your body  · You have chest pain, sweating, or shortness of breath  Call your doctor if:   · You have symptoms of gallbladder or liver disease, such as pain in your upper abdomen  · You have knee or hip pain and discomfort while walking  · You have symptoms of diabetes, such as intense hunger and thirst, and frequent urination  · You have symptoms of sleep apnea, such as snoring or daytime sleepiness  · You have questions or concerns about your condition or care  Treatment for obesity  focuses on helping you lose weight to improve your health  Even a small decrease in BMI can reduce the risk for many health problems  Your healthcare provider will help you set a weight-loss goal   · Lifestyle changes  are the first step in treating obesity  These include making healthy food choices and getting regular physical activity  Your healthcare provider may suggest a weight-loss program that involves coaching, education, and therapy  · Medicine  may help you lose weight when it is used with a healthy foods and physical activity  · Surgery  can help you lose weight if you are very obese and have other health problems  There are several types of weight-loss surgery  Ask your healthcare provider for more information  Be successful losing weight:   · Set small, realistic goals  An example of a small goal is to walk for 20 minutes 5 days a week  Anther goal is to lose 5% of your body weight  · Tell friends, family members, and coworkers about your goals  and ask for their support  Ask a friend to lose weight with you, or join a weight-loss support group  · Identify foods or triggers that may cause you to overeat , and find ways to avoid them  Remove tempting high-calorie foods from your home and workplace  Place a bowl of fresh fruit on your kitchen counter  If stress causes you to eat, then find other ways to cope with stress  A counselor or therapist may be able to help you  · Keep a diary to track what you eat and drink  Also write down how many minutes of physical activity you do each day  Weigh yourself once a week and record it in your diary  Eating changes: You will need to eat 500 to 1,000 fewer calories each day than you currently eat to lose 1 to 2 pounds a week  The following changes will help you cut calories:  · Eat smaller portions  Use small plates, no larger than 9 inches in diameter  Fill your plate half full of fruits and vegetables  Measure your food using measuring cups until you know what a serving size looks like  · Eat 3 meals and 1 or 2 snacks each day  Plan your meals in advance  [place] and eat at home most of the time  Eat slowly  Do not skip meals  Skipping meals can lead to overeating later in the day  This can make it harder for you to lose weight  Talk with a dietitian to help you make a meal plan and schedule that is right for you  · Eat fruits and vegetables at every meal   They are low in calories and high in fiber, which makes you feel full  Do not add butter, margarine, or cream sauce to vegetables  Use herbs to season steamed vegetables  · Eat less fat and fewer fried foods  Eat more baked or grilled chicken and fish  These protein sources are lower in calories and fat than red meat  Limit fast food  Dress your salads with olive oil and vinegar instead of bottled dressing  · Limit the amount of sugar you eat  Do not drink sugary beverages  Limit alcohol  Activity changes:  Physical activity is good for your body in many ways  It helps you burn calories and build strong muscles  It decreases stress and depression, and improves your mood  It can also help you sleep better   Talk to your healthcare provider before you begin an exercise program   · Exercise for at least 30 minutes 5 days a week  Start slowly  Set aside time each day for physical activity that you enjoy and that is convenient for you  It is best to do both weight training and an activity that increases your heart rate, such as walking, bicycling, or swimming  · Find ways to be more active  Do yard work and housecleaning  Walk up the stairs instead of using elevators  Spend your leisure time going to events that require walking, such as outdoor festivals or fairs  This extra physical activity can help you lose weight and keep it off  Follow up with your doctor as directed: You may need to meet with a dietitian  Write down your questions so you remember to ask them during your visits  © Copyright [employer] 2021 Information is for End User's use only and may not be sold, redistributed or otherwise used for commercial purposes  All illustrations and images included in CareNotes® are the copyrighted property of A D A M , Inc  or [facility] Octav[facility] [place]   The above information is an  only  It is not intended as medical advice for individual conditions or treatments  Talk to your doctor, nurse or pharmacist before following any medical regimen to see if it is safe and effective for you  Cholesterol and Your Health   AMBULATORY CARE:   Cholesterol  is a waxy, fat-like substance  Your body uses cholesterol to make hormones and new cells, and to protect nerves  Cholesterol is made by your body  It also comes from certain foods you eat, such as meat and dairy products  Your healthcare provider can help you set goals for your cholesterol levels  He or she can help you create a plan to meet your goals  Cholesterol level goals: Your cholesterol level goals depend on your risk for heart disease, your age, and your other health conditions   The following are general guidelines:  · Total cholesterol  includes low-density lipoprotein (LDL), high-density lipoprotein (HDL), and triglyceride levels  The total cholesterol level should be lower than 200 mg/dL and is best at about 150 mg/dL  · LDL cholesterol  is called bad cholesterol  because it forms plaque in your arteries  As plaque builds up, your arteries become narrow, and less blood flows through  When plaque decreases blood flow to your heart, you may have chest pain  If plaque completely blocks an artery that brings blood to your heart, you may have a heart attack  Plaque can break off and form blood clots  Blood clots may block arteries in your brain and cause a stroke  The level should be less than 130 mg/dL and is best at about 100 mg/dL  · HDL cholesterol  is called good cholesterol  because it helps remove LDL cholesterol from your arteries  It does this by attaching to LDL cholesterol and carrying it to your liver  Your liver breaks down LDL cholesterol so your body can get rid of it  High levels of HDL cholesterol can help prevent a heart attack and stroke  Low levels of HDL cholesterol can increase your risk for heart disease, heart attack, and stroke  The level should be 60 mg/dL or higher  · Triglycerides  are a type of fat that store energy from foods you eat  High levels of triglycerides also cause plaque buildup  This can increase your risk for a heart attack or stroke  If your triglyceride level is high, your LDL cholesterol level may also be high  The level should be less than 150 mg/dL  Any of the following can increase your risk for high cholesterol:   · Smoking cigarettes    · Being overweight or obese, or not getting enough exercise    · Drinking large amounts of alcohol    · A medical condition such as hypertension (high blood pressure) or diabetes    · Certain genes passed from your parents to you    · Age older than 65 years    What you need to know about having your cholesterol levels checked:   Adults 21to 39years of age should have their cholesterol levels checked every 4 to 6 years  Adults 45 years or older should have their cholesterol checked every 1 to 2 years  You may need your cholesterol checked more often, or at a younger age, if you have risk factors for heart disease  You may also need to have your cholesterol checked more often if you have other health conditions, such as diabetes  Blood tests are used to check cholesterol levels  Blood tests measure your levels of triglycerides, LDL cholesterol, and HDL cholesterol  How healthy fats affect your cholesterol levels:  Healthy fats, also called unsaturated fats, help lower LDL cholesterol and triglyceride levels  Healthy fats include the following:  · Monounsaturated fats  are found in foods such as olive oil, canola oil, avocado, nuts, and olives  · Polyunsaturated fats,  such as omega 3 fats, are found in fish, such as salmon, trout, and tuna  They can also be found in plant foods such as flaxseed, walnuts, and soybeans  How unhealthy fats affect your cholesterol levels:  Unhealthy fats increase LDL cholesterol and triglyceride levels  They are found in foods high in cholesterol, saturated fat, and trans fat:  · Cholesterol  is found in eggs, dairy, and meat  · Saturated fat  is found in butter, cheese, ice cream, whole milk, and coconut oil  Saturated fat is also found in meat, such as sausage, hot dogs, and bologna  · Trans fat  is found in liquid oils and is used in fried and baked foods  Foods that contain trans fats include chips, crackers, muffins, sweet rolls, microwave popcorn, and cookies  Treatment  for high cholesterol will also decrease your risk of heart disease, heart attack, and stroke  Treatment may include any of the following:  · Lifestyle changes  may include food, exercise, weight loss, and quitting smoking  You may also need to decrease the amount of alcohol you drink   Your healthcare provider will want you to start with lifestyle changes  Other treatment may be added if lifestyle changes are not enough  Your healthcare provider may recommend you work with a team to manage hyperlipidemia  The team may include medical experts such as a dietitian, an exercise or physical therapist, and a behavior therapist  Your family members may be included in helping you create lifestyle changes  · Medicines  may be given to lower your LDL cholesterol, triglyceride levels, or total cholesterol level  You may need medicines to lower your cholesterol if any of the following is true:    ? You have a history of stroke, TIA, unstable angina, or a heart attack  ? Your LDL cholesterol level is 190 mg/dL or higher  ? You are age 36 to 76 years, have diabetes or heart disease risk factors, and your LDL cholesterol is 70 mg/dL or higher  · Supplements  include fish oil, red yeast rice, and garlic  Fish oil may help lower your triglyceride and LDL cholesterol levels  It may also increase your HDL cholesterol level  Red yeast rice may help decrease your total cholesterol level and LDL cholesterol level  Garlic may help lower your total cholesterol level  Do not take any supplements without talking to your healthcare provider  Food changes you can make to lower your cholesterol levels:  A dietitian can help you create a healthy eating plan  He or she can show you how to read food labels and choose foods low in saturated fat, trans fats, and cholesterol  · Decrease the total amount of fat you eat  Choose lean meats, fat-free or 1% fat milk, and low-fat dairy products, such as yogurt and cheese  Try to limit or avoid red meats  Limit or do not eat fried foods or baked goods, such as cookies  · Replace unhealthy fats with healthy fats  Cook foods in olive oil or canola oil  Choose soft margarines that are low in saturated fat and trans fat  Seeds, nuts, and avocados are other examples of healthy fats  · Eat foods with omega-3 fats    Examples include salmon, tuna, mackerel, walnuts, and flaxseed  Eat fish 2 times per week  Pregnant women should not eat fish that have high levels of mercury, such as shark, swordfish, and maria fernanda mackerel  · Increase the amount of high-fiber foods you eat  High-fiber foods can help lower your LDL cholesterol  Aim to get between 20 and 30 grams of fiber each day  Fruits and vegetables are high in fiber  Eat at least 5 servings each day  Other high-fiber foods are whole-grain or whole-wheat breads, pastas, or cereals, and brown rice  Eat 3 ounces of whole-grain foods each day  Increase fiber slowly  You may have abdominal discomfort, bloating, and gas if you add fiber to your diet too quickly  · Eat healthy protein foods  Examples include low-fat dairy products, skinless chicken and turkey, fish, and nuts  · Limit foods and drinks that are high in sugar  Your dietitian or healthcare provider can help you create daily limits for high-sugar foods and drinks  The limit may be lower if you have diabetes or another health condition  Limits can also help you lose weight if needed  Lifestyle changes you can make to lower your cholesterol levels:   · Maintain a healthy weight  Ask your healthcare provider what a healthy weight is for you  Ask him or her to help you create a weight loss plan if needed  Weight loss can decrease your total cholesterol and triglyceride levels  Weight loss may also help keep your blood pressure at a healthy level  · Be physically active throughout the day  Physical activity, such as exercise, can help lower your total cholesterol level and maintain a healthy weight  Physical activity can also help increase your HDL cholesterol level  Work with your healthcare provider to create an program that is right for you  Get at least 30 to 40 minutes of moderate physical activity most days of the week  Examples of exercise include brisk walking, swimming, or biking   Also include strength training at least 2 times each week  Your healthcare providers can help you create a physical activity plan  · Do not smoke  Nicotine and other chemicals in cigarettes and cigars can raise your cholesterol levels  Ask your healthcare provider for information if you currently smoke and need help to quit  E-cigarettes or smokeless tobacco still contain nicotine  Talk to your healthcare provider before you use these products  · Limit or do not drink alcohol  Alcohol can increase your triglyceride levels  Ask your healthcare provider before you drink alcohol  Ask how much is okay for you to drink in 24 hours or 1 week  Follow up with your doctor as directed:  Write down your questions so you remember to ask them during your visits  © Copyright [employer] 2021 Information is for End User's use only and may not be sold, redistributed or otherwise used for commercial purposes  All illustrations and images included in CareNotes® are the copyrighted property of A D A M , Inc  or [facility] Octaviano Webster   The above information is an  only  It is not intended as medical advice for individual conditions or treatments  Talk to your doctor, nurse or pharmacist before following any medical regimen to see if it is safe and effective for you

## 2022-02-16 NOTE — ASSESSMENT & PLAN NOTE
New   Uncontrolled  Start Losartan 50mg QD  Check blood pressure outside of office  Recommend lifestyle modifications

## 2022-02-16 NOTE — PROGRESS NOTES
Assessment/Plan:  Problem List Items Addressed This Visit        Respiratory    Seasonal allergic rhinitis     Stable on Claritin 10 mg daily p r n     Avoid decongestants  Cardiovascular and Mediastinum    Primary hypertension     New  Uncontrolled  Start Losartan 50mg QD  Check blood pressure outside of office  Recommend lifestyle modifications  Relevant Medications    losartan (COZAAR) 50 mg tablet    Other Relevant Orders    Echo complete w/ contrast if indicated    CBC and differential    Comprehensive metabolic panel       Genitourinary    OAB (overactive bladder)     Stable without meds  Management per Gyne  Other    Class 2 severe obesity with serious comorbidity and body mass index (BMI) of 36 0 to 36 9 in [facility])     Worsening  Recommend lifestyle modifications  Relevant Orders    US right upper quadrant    POCT urine HCG (Completed)      Other Visit Diagnoses     Annual physical exam    -  Primary    RUQ abdominal pain        Relevant Orders    US right upper quadrant    POCT urine HCG (Completed)    Heart murmur        Relevant Orders    Echo complete w/ contrast if indicated    Screening for HIV (human immunodeficiency virus)        Relevant Orders    HIV 1/2 Antigen/Antibody (4th Generation) w Reflex [facility]    Need for hepatitis C screening test        Relevant Orders    Hepatitis C antibody    Screening for colorectal cancer        Relevant Orders    Cologuard    Screening for diabetes mellitus        Relevant Orders    Hemoglobin A1C    Screening for cardiovascular condition        Relevant Orders    CBC and differential    Comprehensive metabolic panel    Lipid panel    LDL cholesterol, direct    Obesity (BMI 35 0-39 9 without comorbidity)        Relevant Orders    US right upper quadrant    POCT urine HCG (Completed)    TSH, 3rd generation with Free T4 reflex    Vitamin D 25 hydroxy    Severe obesity (BMI 35 0-39  9) with comorbidity ([address] ) Relevant Orders    US right upper quadrant    POCT urine HCG (Completed)    TSH, 3rd generation with Free T4 reflex    Vitamin D 25 hydroxy    BMI 36 0-36 9,adult        Relevant Orders    US right upper quadrant    POCT urine HCG (Completed)    Vitamin D 25 hydroxy    Myalgia        Relevant Orders    Vitamin D 25 hydroxy           Return in about 6 weeks (around 3/29/2022) for F/U HTN, RUQ Abd Pain, Murmur, Labs  Future Appointments   Date Time Provider Ramiro Reed   3/31/2022  8:00 AM Darrion Sanders DO [facility]        Subjective:     Shelly Ferrari is a 50 y o  female who presents today for a follow-up on her chronic medical conditions  HPI:  Chief Complaint   Patient presents with    Physical Exam     EST CARE- HIGH BP READING    Medication Refill     NONE    Labs Only     NO LAB    HM     DECLINED TDAP, OK FOR HEP C HIV     -- Above per clinical staff and reviewed  --      HPI      Today:      Physical    Watching diet  +Exercise  AYSE Boyer Ms at [facility] for [address] yearly  Next appt 11/22        No CRC previously        Sees Dentist q6 months  Sees Optho q2 years          Health Maintenance   Topic Date Due    Hepatitis C Screening  Never done    HIV Screening  Never done    COVID-19 Vaccine (3 - Booster for Pfizer series) 10/08/2021    Influenza Vaccine (1) 06/30/2022 (Originally 9/1/2021)    DTaP,Tdap,and Td Vaccines (1 - Tdap) 02/15/2023 (Originally 1/17/1994)    Breast Cancer Screening: Mammogram  11/26/2022    Colorectal Cancer Screening  01/17/2023    Depression Screening  02/15/2023    BMI: Followup Plan  02/15/2023    BMI: Adult  02/15/2023    Annual Physical  02/15/2023    Cervical Cancer Screening  11/17/2024    Pneumococcal Vaccine: Pediatrics (0 to 5 Years) and At-Risk Patients (6 to 59 Years)  Aged Out    HIB Vaccine  Aged Out    Hepatitis B Vaccine  Aged Out    IPV Vaccine  Aged Out    Hepatitis A Vaccine  Aged Out    Meningococcal ACWY Vaccine  Aged Out    HPV Vaccine  Aged Out         The following portions of the patient's history were reviewed and updated as appropriate: allergies, current medications, past family history, past medical history, past social history, past surgical history and problem list       Review of Systems     See other note  Current Outpatient Medications   Medication Sig Dispense Refill    ibuprofen (MOTRIN) 200 mg tablet Take 200 mg by mouth 2 (two) times a day        loratadine (CLARITIN) 10 mg tablet Take 10 mg by mouth daily as needed        losartan (COZAAR) 50 mg tablet Take 1 tablet (50 mg total) by mouth daily 30 tablet 1     No current facility-administered medications for this visit  Objective:  /88   Pulse 83   Temp 97 5 °F (36 4 °C)   Resp 16   Ht 5' 4 69" (1 643 m)   Wt 99 8 kg (220 lb)   LMP 09/24/2021 (Exact Date)   SpO2 100%   Breastfeeding No   BMI 36 97 kg/m²    Wt Readings from Last 3 Encounters:   02/15/22 99 8 kg (220 lb)   11/26/21 97 5 kg (215 lb)   11/17/21 97 6 kg (215 lb 3 2 oz)      BP Readings from Last 3 Encounters:   02/15/22 160/88   11/17/21 (!) 184/102   06/01/20 142/92          Physical Exam     Vitals and nursing note reviewed  Constitutional:       Appearance: Normal appearance  She is well-developed  She is obese  HENT:      Head: Normocephalic and atraumatic  Right Ear: Tympanic membrane, ear canal and external ear normal       Left Ear: Tympanic membrane, ear canal and external ear normal       Nose: Nose normal       Right Sinus: No maxillary sinus tenderness or frontal sinus tenderness  Left Sinus: No maxillary sinus tenderness or frontal sinus tenderness  Mouth/Throat:      Mouth: Mucous membranes are moist       Pharynx: Oropharynx is clear  Uvula midline  Tonsils: No tonsillar exudate  Eyes:      Extraocular Movements: Extraocular movements intact        Conjunctiva/sclera: Conjunctivae normal       Pupils: Pupils are equal, round, and reactive to light  Cardiovascular:      Rate and Rhythm: Normal rate and regular rhythm  Pulses: Normal pulses  Heart sounds: Murmur heard  Systolic murmur is present with a grade of 1/6  Pulmonary:      Effort: Pulmonary effort is normal       Breath sounds: Normal breath sounds  Abdominal:      General: Abdomen is flat  Bowel sounds are normal  There is no distension  Palpations: Abdomen is soft  There is no mass  Tenderness: There is abdominal tenderness in the right upper quadrant  There is no guarding or rebound  Positive signs include Ruiz's sign  Musculoskeletal:         General: No swelling or tenderness  Cervical back: Neck supple  Right lower leg: No edema  Left lower leg: No edema  Lymphadenopathy:      Cervical: No cervical adenopathy  Skin:     Findings: No rash  Neurological:      General: No focal deficit present  Mental Status: She is alert and oriented to person, place, and time  Psychiatric:         Attention and Perception: Attention and perception normal          Mood and Affect: Mood is anxious  Speech: Speech normal          Behavior: Behavior normal  Behavior is cooperative  Thought Content: Thought content normal          Cognition and Memory: Cognition and memory normal          Judgment: Judgment normal      Lab Results:      Lab Results   Component Value Date    WBC 7 32 04/13/2019    HGB 12 2 04/13/2019    HCT 37 8 04/13/2019     04/13/2019     No results found for: URICACID  Invalid input(s): BASENAME Vitamin D    No results found       POCT Labs

## 2022-03-09 ENCOUNTER — HOSPITAL ENCOUNTER (OUTPATIENT)
Dept: RADIOLOGY | Facility: MEDICAL CENTER | Age: 47
Discharge: HOME | End: 2022-03-09
Payer: COMMERCIAL

## 2022-03-09 DIAGNOSIS — R10.11 RUQ ABDOMINAL PAIN: ICD-10-CM

## 2022-03-09 DIAGNOSIS — E66.01 SEVERE OBESITY (BMI 35.0-39.9) WITH COMORBIDITY (HCC): ICD-10-CM

## 2022-03-09 DIAGNOSIS — E66.9 OBESITY (BMI 35.0-39.9 WITHOUT COMORBIDITY): ICD-10-CM

## 2022-03-09 DIAGNOSIS — E66.01 CLASS 2 SEVERE OBESITY WITH SERIOUS COMORBIDITY AND BODY MASS INDEX (BMI) OF 36.0 TO 36.9 IN ADULT, UNSPECIFIED OBESITY TYPE (HCC): ICD-10-CM

## 2022-03-09 PROCEDURE — 76705 ECHO EXAM OF ABDOMEN: CPT

## 2022-03-12 PROBLEM — K76.0 FATTY LIVER: Status: ACTIVE | Noted: 2022-03-01

## 2022-03-12 PROBLEM — R16.0 HEPATOMEGALY: Status: ACTIVE | Noted: 2022-03-12

## 2022-03-12 NOTE — RESULT ENCOUNTER NOTE
Right upper quadrant ultrasound shows a normal gallbladder  Liver is enlarged and mildly fatty  Advise low fat, low cholesterol diet, exercise, weight loss  Nurse to see referral for GI liver specialist for further evaluation

## 2022-03-17 ENCOUNTER — HOSPITAL ENCOUNTER (OUTPATIENT)
Dept: NON INVASIVE DIAGNOSTICS | Facility: MEDICAL CENTER | Age: 47
Discharge: HOME | End: 2022-03-17
Payer: COMMERCIAL

## 2022-03-17 VITALS
WEIGHT: 220 LBS | HEIGHT: 65 IN | BODY MASS INDEX: 36.65 KG/M2 | SYSTOLIC BLOOD PRESSURE: 160 MMHG | DIASTOLIC BLOOD PRESSURE: 88 MMHG | HEART RATE: 83 BPM

## 2022-03-17 DIAGNOSIS — R01.1 HEART MURMUR: ICD-10-CM

## 2022-03-17 DIAGNOSIS — I10 PRIMARY HYPERTENSION: ICD-10-CM

## 2022-03-17 PROBLEM — I34.0 MITRAL REGURGITATION: Status: ACTIVE | Noted: 2022-03-01

## 2022-03-17 PROBLEM — I51.7 LEFT ATRIAL DILATATION: Status: ACTIVE | Noted: 2022-03-17

## 2022-03-17 PROBLEM — I07.1 TRICUSPID REGURGITATION: Status: ACTIVE | Noted: 2022-03-01

## 2022-03-17 LAB
AORTIC ROOT: 2.8 CM
APICAL FOUR CHAMBER EJECTION FRACTION: 55 %
ASCENDING AORTA: 2.6 CM (ref 2.12–3.17)
E WAVE DECELERATION TIME: 200 MS
FRACTIONAL SHORTENING: 44 % (ref 28–44)
INTERVENTRICULAR SEPTUM IN DIASTOLE (PARASTERNAL SHORT AXIS VIEW): 0.8 CM
INTERVENTRICULAR SEPTUM: 0.8 CM (ref 0.55–1.03)
LAAS-AP2: 27.4 CM2
LAAS-AP4: 25.8 CM2
LEFT ATRIUM AREA SYSTOLE SINGLE PLANE A4C: 27 CM2
LEFT ATRIUM SIZE: 5 CM
LEFT INTERNAL DIMENSION IN SYSTOLE: 3.1 CM (ref 3.6–5.46)
LEFT VENTRICULAR INTERNAL DIMENSION IN DIASTOLE: 5.5 CM (ref 5.97–8.9)
LEFT VENTRICULAR POSTERIOR WALL IN END DIASTOLE: 0.9 CM (ref 0.54–1.02)
LEFT VENTRICULAR STROKE VOLUME: 112 ML
LVSV (TEICH): 112 ML
MV E'TISSUE VEL-SEP: 11 CM/S
MV PEAK A VEL: 0.75 M/S
MV PEAK E VEL: 103 CM/S
MV STENOSIS PRESSURE HALF TIME: 58 MS
MV VALVE AREA P 1/2 METHOD: 3.79 CM2
RA PRESSURE ESTIMATED: 3 MMHG
RIGHT ATRIAL 2D VOLUME: 25 ML
RIGHT ATRIUM AREA SYSTOLE A4C: 13.8 CM2
RIGHT VENTRICLE ID DIMENSION: 4.2 CM
RV PSP: 42 MMHG
SL CV LEFT ATRIUM LENGTH A2C: 5.7 CM
SL CV LV EF: 65
SL CV PED ECHO LEFT VENTRICLE DIASTOLIC VOLUME (MOD BIPLANE) 2D: 151 ML
SL CV PED ECHO LEFT VENTRICLE SYSTOLIC VOLUME (MOD BIPLANE) 2D: 39 ML
TR MAX PG: 39 MMHG
TR PEAK VELOCITY: 3.1 M/S
TRICUSPID VALVE PEAK REGURGITATION VELOCITY: 3.12 M/S
Z-SCORE OF ASCENDING AORTA: -0.16
Z-SCORE OF INTERVENTRICULAR SEPTUM IN END DIASTOLE: 0.06
Z-SCORE OF LEFT VENTRICULAR DIMENSION IN END DIASTOLE: -2.81
Z-SCORE OF LEFT VENTRICULAR DIMENSION IN END SYSTOLE: -2.83
Z-SCORE OF LEFT VENTRICULAR POSTERIOR WALL IN END DIASTOLE: 0.99

## 2022-03-17 PROCEDURE — 93306 TTE W/DOPPLER COMPLETE: CPT | Performed by: INTERNAL MEDICINE

## 2022-03-17 PROCEDURE — 93306 TTE W/DOPPLER COMPLETE: CPT

## 2022-03-17 NOTE — RESULT ENCOUNTER NOTE
Echocardiogram shows good squeezing and relaxing function of the heart  The left atrium of the heart is severely dilated her enlarged  There is trace regurgitation or backflow of blood through the heart at the mitral and tricuspid valves  Nurse to see orders for cardiology consult for further evaluation

## 2022-03-18 DIAGNOSIS — I10 PRIMARY HYPERTENSION: ICD-10-CM

## 2022-03-18 RX ORDER — LOSARTAN POTASSIUM 50 MG/1
50 TABLET ORAL DAILY
Qty: 30 TABLET | Refills: 0 | Status: CANCELLED | OUTPATIENT
Start: 2022-03-18

## 2022-03-18 NOTE — TELEPHONE ENCOUNTER
Phone call placed to patient to remind patient that she is due for blood work  Parminder flores per communication form to call the office back

## 2022-03-19 ENCOUNTER — LAB (OUTPATIENT)
Dept: LAB | Facility: CLINIC | Age: 47
End: 2022-03-19
Payer: COMMERCIAL

## 2022-03-19 DIAGNOSIS — Z13.1 SCREENING FOR DIABETES MELLITUS: ICD-10-CM

## 2022-03-19 DIAGNOSIS — E66.01 SEVERE OBESITY (BMI 35.0-39.9) WITH COMORBIDITY (HCC): ICD-10-CM

## 2022-03-19 DIAGNOSIS — E66.9 OBESITY (BMI 35.0-39.9 WITHOUT COMORBIDITY): ICD-10-CM

## 2022-03-19 DIAGNOSIS — Z11.59 NEED FOR HEPATITIS C SCREENING TEST: ICD-10-CM

## 2022-03-19 DIAGNOSIS — I10 PRIMARY HYPERTENSION: ICD-10-CM

## 2022-03-19 DIAGNOSIS — M79.10 MYALGIA: ICD-10-CM

## 2022-03-19 DIAGNOSIS — Z11.4 SCREENING FOR HIV (HUMAN IMMUNODEFICIENCY VIRUS): ICD-10-CM

## 2022-03-19 DIAGNOSIS — Z13.6 SCREENING FOR CARDIOVASCULAR CONDITION: ICD-10-CM

## 2022-03-19 LAB
25(OH)D3 SERPL-MCNC: 15.6 NG/ML (ref 30–100)
ALBUMIN SERPL BCP-MCNC: 4 G/DL (ref 3.5–5)
ALP SERPL-CCNC: 79 U/L (ref 46–116)
ALT SERPL W P-5'-P-CCNC: 25 U/L (ref 12–78)
ANION GAP SERPL CALCULATED.3IONS-SCNC: 6 MMOL/L (ref 4–13)
AST SERPL W P-5'-P-CCNC: 20 U/L (ref 5–45)
BASOPHILS # BLD AUTO: 0.04 THOUSANDS/ΜL (ref 0–0.1)
BASOPHILS NFR BLD AUTO: 1 % (ref 0–1)
BILIRUB SERPL-MCNC: 0.31 MG/DL (ref 0.2–1)
BUN SERPL-MCNC: 20 MG/DL (ref 5–25)
CALCIUM SERPL-MCNC: 8.9 MG/DL (ref 8.3–10.1)
CHLORIDE SERPL-SCNC: 109 MMOL/L (ref 100–108)
CHOLEST SERPL-MCNC: 231 MG/DL
CO2 SERPL-SCNC: 25 MMOL/L (ref 21–32)
CREAT SERPL-MCNC: 0.66 MG/DL (ref 0.6–1.3)
EOSINOPHIL # BLD AUTO: 0.2 THOUSAND/ΜL (ref 0–0.61)
EOSINOPHIL NFR BLD AUTO: 3 % (ref 0–6)
ERYTHROCYTE [DISTWIDTH] IN BLOOD BY AUTOMATED COUNT: 12.4 % (ref 11.6–15.1)
EST. AVERAGE GLUCOSE BLD GHB EST-MCNC: 111 MG/DL
GFR SERPL CREATININE-BSD FRML MDRD: 104 ML/MIN/1.73SQ M
GLUCOSE P FAST SERPL-MCNC: 107 MG/DL (ref 65–99)
HBA1C MFR BLD: 5.5 %
HCT VFR BLD AUTO: 36.9 % (ref 34.8–46.1)
HCV AB SER QL: NORMAL
HDLC SERPL-MCNC: 76 MG/DL
HGB BLD-MCNC: 12.1 G/DL (ref 11.5–15.4)
IMM GRANULOCYTES # BLD AUTO: 0.02 THOUSAND/UL (ref 0–0.2)
IMM GRANULOCYTES NFR BLD AUTO: 0 % (ref 0–2)
LDLC SERPL CALC-MCNC: 145 MG/DL (ref 0–100)
LDLC SERPL DIRECT ASSAY-MCNC: 131 MG/DL (ref 0–100)
LYMPHOCYTES # BLD AUTO: 2.4 THOUSANDS/ΜL (ref 0.6–4.47)
LYMPHOCYTES NFR BLD AUTO: 31 % (ref 14–44)
MCH RBC QN AUTO: 30.2 PG (ref 26.8–34.3)
MCHC RBC AUTO-ENTMCNC: 32.8 G/DL (ref 31.4–37.4)
MCV RBC AUTO: 92 FL (ref 82–98)
MONOCYTES # BLD AUTO: 0.49 THOUSAND/ΜL (ref 0.17–1.22)
MONOCYTES NFR BLD AUTO: 6 % (ref 4–12)
NEUTROPHILS # BLD AUTO: 4.52 THOUSANDS/ΜL (ref 1.85–7.62)
NEUTS SEG NFR BLD AUTO: 59 % (ref 43–75)
NONHDLC SERPL-MCNC: 155 MG/DL
NRBC BLD AUTO-RTO: 0 /100 WBCS
PLATELET # BLD AUTO: 290 THOUSANDS/UL (ref 149–390)
PMV BLD AUTO: 10.1 FL (ref 8.9–12.7)
POTASSIUM SERPL-SCNC: 4.1 MMOL/L (ref 3.5–5.3)
PROT SERPL-MCNC: 7.3 G/DL (ref 6.4–8.2)
RBC # BLD AUTO: 4.01 MILLION/UL (ref 3.81–5.12)
SODIUM SERPL-SCNC: 140 MMOL/L (ref 136–145)
TRIGL SERPL-MCNC: 48 MG/DL
TSH SERPL DL<=0.05 MIU/L-ACNC: 1.44 UIU/ML (ref 0.36–3.74)
WBC # BLD AUTO: 7.67 THOUSAND/UL (ref 4.31–10.16)

## 2022-03-19 PROCEDURE — 84443 ASSAY THYROID STIM HORMONE: CPT

## 2022-03-19 PROCEDURE — 86803 HEPATITIS C AB TEST: CPT

## 2022-03-19 PROCEDURE — 83036 HEMOGLOBIN GLYCOSYLATED A1C: CPT

## 2022-03-19 PROCEDURE — 85025 COMPLETE CBC W/AUTO DIFF WBC: CPT

## 2022-03-19 PROCEDURE — 82306 VITAMIN D 25 HYDROXY: CPT

## 2022-03-19 PROCEDURE — 87389 HIV-1 AG W/HIV-1&-2 AB AG IA: CPT

## 2022-03-19 PROCEDURE — 83721 ASSAY OF BLOOD LIPOPROTEIN: CPT

## 2022-03-19 PROCEDURE — 36415 COLL VENOUS BLD VENIPUNCTURE: CPT

## 2022-03-19 PROCEDURE — 80053 COMPREHEN METABOLIC PANEL: CPT

## 2022-03-19 PROCEDURE — 80061 LIPID PANEL: CPT

## 2022-03-20 PROBLEM — E55.9 VITAMIN D DEFICIENCY: Status: ACTIVE | Noted: 2022-03-01

## 2022-03-20 PROBLEM — E78.49 OTHER HYPERLIPIDEMIA: Status: ACTIVE | Noted: 2022-03-01

## 2022-03-20 NOTE — RESULT ENCOUNTER NOTE
Unstable labs - will review with patient at upcoming appointment  Hyperlipidemia - New  Recommend lifestyle modifications  The 10-year ASCVD risk score (Soo Lua et al , 2013) is: 1 9%    Values used to calculate the score:      Age: 50 years      Sex: Female      Is Non- : No      Diabetic: No      Tobacco smoker: No      Systolic Blood Pressure: 022 mmHg      Is BP treated: Yes      HDL Cholesterol: 76 mg/dL      Total Cholesterol: 231 mg/dL    Vitamin D Deficiency - Recommend start multivitamin and prescription vitamin D (Drisdol)  When 12 weeks of Drisdol completed, continue multivitamin and start over-the-counter Vitamin D3 1,000-3,000 International Units daily  Check vitamin D level 1 week afterwards completing Drisdol

## 2022-03-21 LAB — HIV 1+2 AB+HIV1 P24 AG SERPL QL IA: NORMAL

## 2022-03-24 ENCOUNTER — RA CDI HCC (OUTPATIENT)
Dept: OTHER | Facility: HOSPITAL | Age: 47
End: 2022-03-24

## 2022-03-24 NOTE — PROGRESS NOTES
[facility]  coding opportunities       Chart reviewed, no opportunity found: CHART REVIEWED, NO OPPORTUNITY FOUND        Patients Insurance        Commercial Insurance: [address] [place] [address]

## 2022-03-30 NOTE — PROGRESS NOTES
Assessment/Plan:  Problem List Items Addressed This Visit        Digestive    Fatty liver     Previously referred to GI  Recommend lifestyle modifications  Relevant Orders    Comprehensive metabolic panel       Cardiovascular and Mediastinum    Primary hypertension - Primary     Improved, but still uncontrolled  Increase Losartan 100mg QD  Check blood pressure outside of office  Recommend lifestyle modifications  Relevant Medications    losartan (COZAAR) 100 MG tablet    Other Relevant Orders    Comprehensive metabolic panel    Comprehensive metabolic panel    Left atrial dilatation     Previously referred to Cardio  Mitral regurgitation     Stable  Previously referred to Cardio  Tricuspid regurgitation     Stable  Previously referred to Cardio  Other    Class 2 severe obesity with serious comorbidity and body mass index (BMI) of 36 0 to 36 9 in adult (HCC)     Stable  Recommend lifestyle modifications  Relevant Orders    TSH, 3rd generation with Free T4 reflex    Other hyperlipidemia     Stable s statin  Recommend lifestyle modifications  The 10-year ASCVD risk score (Jagruti Figueroa et al , 2013) is: 2 3%    Values used to calculate the score:      Age: 50 years      Sex: Female      Is Non- : No      Diabetic: No      Tobacco smoker: No      Systolic Blood Pressure: 327 mmHg      Is BP treated: Yes      HDL Cholesterol: 76 mg/dL      Total Cholesterol: 231 mg/dL           Relevant Orders    Comprehensive metabolic panel    Lipid panel    TSH, 3rd generation with Free T4 reflex    LDL cholesterol, direct    Vitamin D deficiency     New  Vitamin D Deficiency - Recommend start multivitamin and prescription vitamin D (Drisdol)  When 12 weeks of Drisdol completed, continue multivitamin and start over-the-counter Vitamin D3 1,000-3,000 International Units daily    Check vitamin D level 1 week afterwards completing Drisdol  Relevant Medications    ergocalciferol (ERGOCALCIFEROL) 1 25 MG (10627 UT) capsule    Other Relevant Orders    Vitamin D 25 hydroxy    Comprehensive metabolic panel           Return in about 6 weeks (around 5/12/2022) for F/U HTN, Vit D Def, Labs; 4mo - HTN, HL, Vit D Def, Labs  Future Appointments   Date Time Provider Ramiro Pulidoisti   5/10/2022  3:40 PM Shamar Hurt,  [facility]        Subjective:     Flory Hutchins is a 50 y o  female who presents today for a follow-up on her chronic medical conditions  HPI:  Chief Complaint   Patient presents with    Follow-up     HTN, RUQ Abd Pain, Murmur - NO CONCERNS     Labs Only     DONE     Medication Refill     NONE AT THIS TIME     Immunizations     DECLINED COVID BOOSTER      -- Above per clinical staff and reviewed  --      HPI      Today:    Return in about 6 weeks (around 3/29/2022) for F/U HTN, RUQ Abd Pain, Murmur, Labs        Obesity - Watching diet  She not adding salt to food  Does intermittent fasting during the work week  +Exercise - Gym  -  / Weights / Lorence Flick / Class for 1 hour, 6 times per week        HTN -  On Losartan 50mg QD x 6 weeks  BP check outside of office on arm cuff 140's/70's  Hyperlipidemia - No statin previously  Vitamin D Deficiency - No MVI or OTC Vitamin D         RUQ Abdominal Pain / Fatty Liver - s/p RUQ U/S on 3/9/22 - stable gallbladder  Referred to GI 3/12/22 - no appt scheduled          Left Atrial Dilation / Mitral Regurgitation / Tricuspid Regurgitation - Referred to Cardio 3/17/22 - no appt scheduled  From previous note:    Controlled Substance Review     [place]MP or [place]  reviewed: No red flags were identified; safe to proceed with prescription       Obesity - Watching diet  She not adding salt to food  Does intermittent fasting during the work week  +Exercise - Gym  -  / Weights / Lorence Flick / Class for 1 hour, 6 times per week     HTN -  /102 on 11/1721 at [address]  BP check outside of office on arm cuff 160's/70/s        Heart Murmur - Amite by Gyn at appt on 11/17/21  No Echo previously        OAB - Management per Ms Gordon A[facility] [place]  Next appt 11/22  No meds        AR - Stable on Claritin PRN        Reviewed:  Labs 3/19/22, Gyn 11/17/21     Sees Gyn Ms Gordon [facility] [place] at  for [address] yearly  Next appt 11/22        No CRC previously        Sees Dentist q6 months  Sees Optho q2 years            The following portions of the patient's history were reviewed and updated as appropriate: allergies, current medications, past family history, past medical history, past social history, past surgical history and problem list       Review of Systems   Constitutional: Negative for appetite change, chills, diaphoresis, fatigue and fever  Respiratory: Negative for chest tightness and shortness of breath  Cardiovascular: Negative for chest pain  Gastrointestinal: Positive for abdominal pain (RUQ)  Negative for blood in stool, diarrhea, nausea and vomiting  Genitourinary: Negative for dysuria  Current Outpatient Medications   Medication Sig Dispense Refill    ibuprofen (MOTRIN) 200 mg tablet Take 200 mg by mouth 2 (two) times a day        loratadine (CLARITIN) 10 mg tablet Take 10 mg by mouth daily as needed        ergocalciferol (ERGOCALCIFEROL) 1 25 MG (22206 UT) capsule Take 1 capsule (50,000 Units total) by mouth once a week for 12 doses 12 capsule 0    losartan (COZAAR) 100 MG tablet Take 1 tablet (100 mg total) by mouth daily 30 tablet 1     No current facility-administered medications for this visit         Objective:  BP (!) 174/80   Pulse 72   Temp 97 7 °F (36 5 °C)   Resp 16   Ht 5' 5" (1 651 m)   Wt 99 2 kg (218 lb 12 8 oz)   SpO2 100%   BMI 36 41 kg/m²    Wt Readings from Last 3 Encounters:   03/31/22 99 2 kg (218 lb 12 8 oz)   03/17/22 99 8 kg (220 lb) 02/15/22 99 8 kg (220 lb)      BP Readings from Last 3 Encounters:   03/31/22 (!) 174/80   03/17/22 160/88   02/15/22 160/88          Physical Exam  Vitals and nursing note reviewed  Constitutional:       Appearance: Normal appearance  She is well-developed  She is obese  HENT:      Head: Normocephalic and atraumatic  Eyes:      Conjunctiva/sclera: Conjunctivae normal    Neck:      Thyroid: No thyromegaly  Cardiovascular:      Rate and Rhythm: Normal rate and regular rhythm  Pulses: Normal pulses  Heart sounds: Normal heart sounds  Pulmonary:      Effort: Pulmonary effort is normal       Breath sounds: Normal breath sounds  Abdominal:      General: Bowel sounds are normal  There is no distension  Palpations: Abdomen is soft  There is no mass  Tenderness: There is no abdominal tenderness  There is no right CVA tenderness, left CVA tenderness, guarding or rebound  Musculoskeletal:         General: No swelling  Cervical back: Neck supple  Right lower leg: No edema  Left lower leg: No edema  Neurological:      General: No focal deficit present  Mental Status: She is alert and oriented to person, place, and time  Lab Results:      Lab Results   Component Value Date    WBC 7 67 03/19/2022    HGB 12 1 03/19/2022    HCT 36 9 03/19/2022     03/19/2022    TRIG 48 03/19/2022    HDL 76 03/19/2022    LDLDIRECT 131 (H) 03/19/2022    ALT 25 03/19/2022    AST 20 03/19/2022    K 4 1 03/19/2022     (H) 03/19/2022    CREATININE 0 66 03/19/2022    BUN 20 03/19/2022    CO2 25 03/19/2022    GLUF 107 (H) 03/19/2022    HGBA1C 5 5 03/19/2022     No results found for: URICACID  Invalid input(s): BASENAME Vitamin D    US right upper quadrant    Result Date: 3/11/2022  Narrative: RIGHT UPPER QUADRANT ULTRASOUND INDICATION:     E66 01: Morbid (severe) obesity due to excess calories Z68 36:  Body mass index (BMI) 36 0-36 9, adult E66 9: Obesity, unspecified E66 01: Morbid (severe) obesity due to excess calories Z68 36: Body mass index (BMI) 36 0-36 9, adult R10 11: Right upper quadrant pain  COMPARISON:  None TECHNIQUE:   Real-time ultrasound of the right upper quadrant was performed with a curvilinear transducer with both volumetric sweeps and still imaging techniques  FINDINGS: PANCREAS:  Visualized portions of the pancreas are within normal limits  AORTA AND IVC:  Visualized portions are normal for patient age  LIVER: Size:  Enlarged  The liver measures 18 7 cm in the midclavicular line  Contour:  Surface contour is smooth  Parenchyma:  Mild increased echogenicity relative to right renal cortex  No liver mass identified  Limited imaging of the main portal vein shows it to be patent and hepatopetal   BILIARY: No gallbladder findings  No intrahepatic biliary dilatation  CBD measures 5 mm  No choledocholithiasis  Negative Ruiz's sign KIDNEY: Right kidney measures 10 7 x 5 4 x 5 1 cm  Volume 152 7 mL Kidney within normal limits  ASCITES:   None  Impression: Hepatomegaly Hepatic mild steatosis Workstation performed: [id number]     Echo complete w/ contrast if indicated    Result Date: 3/17/2022  Narrative: Madon Cousin  Left Ventricle: Left ventricular cavity size is normal  Wall thickness is normal  The left ventricular ejection fraction is 65%  Systolic function is normal  Wall motion is normal  Diastolic function is normal    Right Ventricle: Right ventricular cavity size is mildly dilated  Systolic function is normal    Left Atrium: The atrium is severely dilated (>48 mL/m2)    Aortic Valve: There is no evidence of stenosis    Mitral Valve: There is trace regurgitation    Tricuspid Valve: There is mild regurgitation  The right ventricular systolic pressure is mildly elevated  The estimated right ventricular systolic pressure is 66 93 mmHg          POCT Labs

## 2022-03-31 ENCOUNTER — OFFICE VISIT (OUTPATIENT)
Dept: FAMILY MEDICINE CLINIC | Facility: CLINIC | Age: 47
End: 2022-03-31
Payer: COMMERCIAL

## 2022-03-31 VITALS
SYSTOLIC BLOOD PRESSURE: 174 MMHG | HEART RATE: 72 BPM | OXYGEN SATURATION: 100 % | HEIGHT: 65 IN | TEMPERATURE: 97.7 F | WEIGHT: 218.8 LBS | BODY MASS INDEX: 36.46 KG/M2 | DIASTOLIC BLOOD PRESSURE: 80 MMHG | RESPIRATION RATE: 16 BRPM

## 2022-03-31 DIAGNOSIS — I51.7 LEFT ATRIAL DILATATION: ICD-10-CM

## 2022-03-31 DIAGNOSIS — I07.1 TRICUSPID VALVE INSUFFICIENCY, UNSPECIFIED ETIOLOGY: ICD-10-CM

## 2022-03-31 DIAGNOSIS — E66.01 CLASS 2 SEVERE OBESITY WITH SERIOUS COMORBIDITY AND BODY MASS INDEX (BMI) OF 36.0 TO 36.9 IN ADULT, UNSPECIFIED OBESITY TYPE (HCC): ICD-10-CM

## 2022-03-31 DIAGNOSIS — I34.0 MITRAL VALVE INSUFFICIENCY, UNSPECIFIED ETIOLOGY: ICD-10-CM

## 2022-03-31 DIAGNOSIS — E55.9 VITAMIN D DEFICIENCY: ICD-10-CM

## 2022-03-31 DIAGNOSIS — I10 PRIMARY HYPERTENSION: Primary | ICD-10-CM

## 2022-03-31 DIAGNOSIS — K76.0 FATTY LIVER: ICD-10-CM

## 2022-03-31 DIAGNOSIS — E78.49 OTHER HYPERLIPIDEMIA: ICD-10-CM

## 2022-03-31 PROCEDURE — 99214 OFFICE O/P EST MOD 30 MIN: CPT | Performed by: FAMILY MEDICINE

## 2022-03-31 PROCEDURE — 3008F BODY MASS INDEX DOCD: CPT | Performed by: FAMILY MEDICINE

## 2022-03-31 RX ORDER — LOSARTAN POTASSIUM 100 MG/1
100 TABLET ORAL DAILY
Qty: 30 TABLET | Refills: 1 | Status: SHIPPED | OUTPATIENT
Start: 2022-03-31 | End: 2022-05-10 | Stop reason: SDUPTHER

## 2022-03-31 RX ORDER — ERGOCALCIFEROL (VITAMIN D2) 1250 MCG
50000 CAPSULE ORAL WEEKLY
Qty: 12 CAPSULE | Refills: 0 | Status: SHIPPED | OUTPATIENT
Start: 2022-03-31 | End: 2022-08-10

## 2022-03-31 NOTE — ASSESSMENT & PLAN NOTE
Stable s statin  Recommend lifestyle modifications      The 10-year ASCVD risk score (Gabriela Landis et al , 2013) is: 2 3%    Values used to calculate the score:      Age: 50 years      Sex: Female      Is Non- : No      Diabetic: No      Tobacco smoker: No      Systolic Blood Pressure: 552 mmHg      Is BP treated: Yes      HDL Cholesterol: 76 mg/dL      Total Cholesterol: 231 mg/dL

## 2022-03-31 NOTE — ASSESSMENT & PLAN NOTE
Improved, but still uncontrolled  Increase Losartan 100mg QD  Check blood pressure outside of office  Recommend lifestyle modifications

## 2022-04-30 ENCOUNTER — LAB (OUTPATIENT)
Dept: LAB | Facility: CLINIC | Age: 47
End: 2022-04-30
Payer: COMMERCIAL

## 2022-04-30 DIAGNOSIS — I10 PRIMARY HYPERTENSION: ICD-10-CM

## 2022-04-30 LAB
ALBUMIN SERPL BCP-MCNC: 4 G/DL (ref 3.5–5)
ALP SERPL-CCNC: 80 U/L (ref 46–116)
ALT SERPL W P-5'-P-CCNC: 25 U/L (ref 12–78)
ANION GAP SERPL CALCULATED.3IONS-SCNC: 0 MMOL/L (ref 4–13)
AST SERPL W P-5'-P-CCNC: 17 U/L (ref 5–45)
BILIRUB SERPL-MCNC: 0.46 MG/DL (ref 0.2–1)
BUN SERPL-MCNC: 15 MG/DL (ref 5–25)
CALCIUM SERPL-MCNC: 9.4 MG/DL (ref 8.3–10.1)
CHLORIDE SERPL-SCNC: 108 MMOL/L (ref 100–108)
CO2 SERPL-SCNC: 31 MMOL/L (ref 21–32)
CREAT SERPL-MCNC: 0.7 MG/DL (ref 0.6–1.3)
GFR SERPL CREATININE-BSD FRML MDRD: 102 ML/MIN/1.73SQ M
GLUCOSE P FAST SERPL-MCNC: 126 MG/DL (ref 65–99)
POTASSIUM SERPL-SCNC: 4.9 MMOL/L (ref 3.5–5.3)
PROT SERPL-MCNC: 7 G/DL (ref 6.4–8.2)
SODIUM SERPL-SCNC: 139 MMOL/L (ref 136–145)

## 2022-04-30 PROCEDURE — 80053 COMPREHEN METABOLIC PANEL: CPT

## 2022-04-30 PROCEDURE — 36415 COLL VENOUS BLD VENIPUNCTURE: CPT

## 2022-05-05 ENCOUNTER — OFFICE VISIT (OUTPATIENT)
Dept: GASTROENTEROLOGY | Facility: AMBULARY SURGERY CENTER | Age: 47
End: 2022-05-05
Payer: COMMERCIAL

## 2022-05-05 VITALS
HEIGHT: 65 IN | BODY MASS INDEX: 36.32 KG/M2 | WEIGHT: 218 LBS | DIASTOLIC BLOOD PRESSURE: 80 MMHG | SYSTOLIC BLOOD PRESSURE: 142 MMHG

## 2022-05-05 DIAGNOSIS — R10.31 RLQ ABDOMINAL PAIN: Primary | ICD-10-CM

## 2022-05-05 DIAGNOSIS — R16.0 HEPATOMEGALY: ICD-10-CM

## 2022-05-05 DIAGNOSIS — R10.11 RUQ ABDOMINAL PAIN: ICD-10-CM

## 2022-05-05 DIAGNOSIS — K76.0 FATTY LIVER: ICD-10-CM

## 2022-05-05 PROCEDURE — 99204 OFFICE O/P NEW MOD 45 MIN: CPT | Performed by: INTERNAL MEDICINE

## 2022-05-05 NOTE — PATIENT INSTRUCTIONS
1   As discussed today, please have the blood work done at your earliest convenience  2   Schedule the US elastorgraphy  3   Continue with your weight loss efforts  4   Follow-up regarding your cologard  Or, I recommend scheduling a colonoscopy

## 2022-05-05 NOTE — PROGRESS NOTES
[facility] - Outpatient Consultation  Sonam Godwin 50 y o  female MRN: [id number]  Encounter: [id number]    PCP:  Chaparrita Robledo [facility] [place], [phone]  Referring Provider:  Cyndi Gillespie DO, [phone]        ASSESSMENT AND PLAN:      I discussed with Ms Ronnie Melo the natural history of fatty liver disease, including risks for development, and risk for progression to cirrhosis and its complications  We reviewed that there are no current medications that are FDA approved for the specific management of fatty liver disease  I explained that there are many, many compounds (both new and medications currently used for other indications) that are currently being studied to treat several aspects of fatty liver disease, including decreasing hepatic fat, hepatic inflammation, hepatic fibrosis, as well reducing risk factors for the development of fatty liver (primarily through weight loss)  We do not currently have active clinical trials through my office, but I will notify Ms Whitley when we do if she meets criteria for inclusion  I explained that even if liver enzymes are normal, it does not rule out low levels of active fatty inflammation in the liver  NAFLD Fibrosis Score is a non-invasive calculation based on patient characteristics and basic routine labs (Age, BMI, AST, ALT, Platelet count, Albumin and presence/absence of insulin resistance)  Ms Leeanne Last NAFLD Fibrosis score is -1 07  Scores between -1 455 and 0 675 are considered indeterminant and unfortunately are not very helpful for predicting fibrosis stage in NAFLD  I have requested lab based NAVA Fibrosure and abdominal ultrasound elastography with fat quantification (UGAP) to further assess hepatic fibrosis and steatosis in a non-invasive manner  I counseled Ms Whitley on the importance of weight loss through lifestyle modification, primary diet and exercise, and the benefits of seeking input of a dietician/nutritionist as well as consultation with a medical weight loss specialist or bariatric surgery team     She has not had colon cancer screening  She is reluctant to have a colonoscopy, and is awaiting delivery of the Cologard kit  I do not believe her RLQ pain is hepatobiliary in nature  There does not appear to be any relation to food intake or bowel movement  It is possible that it is more MSK in nature  If pain continues, consider CT scan with oral and IV contract for additional evaluation  Ms Amadeo Jaeger will have the testing done as outlined above and follow-up in 1-2 months  FOLLOW-UP:  Return in about 6 weeks (around 6/16/2022)  VISIT DIAGNOSES AND ORDERS:      1  RLQ abdominal pain    2  RUQ abdominal pain    3  Hepatomegaly    4  Fatty liver      Orders Placed This Encounter   Procedures    US elastography    NAVA Fibrosure     ______________________________________________________________________    HPI:  Ms Donald Luna is a 50 y o  female with medical history as outlined below including obesity, HTN, HLD, who comes in today for initial consultation for ultrasound findings of mild hepatomegaly and mild hepatic steatosis  Ms Amadeo Jaeger denies recent or history of yellow eyes/skin, dark urine, GI bleeding, abdominal distention with fluid, excessive bleeding, pruritus or confusion  She does complain of easy bruising  She does have occasional LE edema, since staring losartan  she denies, nausea, vomiting, heartburn, reflux, difficulty swallowing, early satiety, bloating, diarrhea, constipation or straining with passing stools  She described her pain as RLQ in location with occasional radiation to the right flank  She denies associated with food intake or bowel movements  She has one BM daily, described as soft without evidence of blood  She is unsure if it is positional     She has never had a colonoscopy      REVIEW OF SYSTEMS:    Review of Systems   Constitutional: Negative for activity change, chills, fatigue, fever and unexpected weight change  HENT: Negative for ear pain, nosebleeds, sore throat and trouble swallowing  Eyes: Negative for pain and visual disturbance  Respiratory: Negative for cough, chest tightness and shortness of breath  Cardiovascular: Negative for chest pain, palpitations and leg swelling  Gastrointestinal: Positive for abdominal pain (RLQ)  Negative for abdominal distention, blood in stool, diarrhea, nausea and vomiting  Endocrine: Negative  Negative for polydipsia and polyuria  Genitourinary: Negative  Negative for difficulty urinating, dysuria, frequency and hematuria  Musculoskeletal: Negative for arthralgias and back pain  Skin: Negative for color change and rash  Allergic/Immunologic: Negative  Negative for food allergies  Neurological: Negative  Negative for dizziness, seizures, syncope, weakness, light-headedness and numbness  Hematological: Bruises/bleeds easily  Psychiatric/Behavioral: Negative for sleep disturbance  The patient is not nervous/anxious  All other systems reviewed and are negative          Historical Information   Patient Active Problem List   Diagnosis    Abnormal uterine bleeding (AUB)    Endometrial polyp    Female pelvic pain    Menorrhagia with irregular cycle    Class 2 severe obesity with serious comorbidity and body mass index (BMI) of 36 0 to 36 9 in [facility])    Primary hypertension    OAB (overactive bladder)    Seasonal allergic rhinitis    Hepatomegaly    Fatty liver    Left atrial dilatation    Mitral regurgitation    Tricuspid regurgitation    Other hyperlipidemia    Vitamin D deficiency     Past Medical History:   Diagnosis Date    Class 2 severe obesity with serious comorbidity and body mass index (BMI) of 36 0 to 36 9 in [facility])     Fatty liver 03/2022    Hepatomegaly 03/2022    Left atrial dilatation 03/2022    Mitral regurgitation 03/2022    Trace    OAB (overactive bladder)     Other hyperlipidemia 03/2022    Primary hypertension     Seasonal allergic rhinitis     Tricuspid regurgitation 03/2022    Vitamin D deficiency 03/2022     Past Surgical History:   Procedure Laterality Date    HYSTEROSCOPY W/ POLYPECTOMY      times 2/ 2016/ 2nd one aug 18/18; Endometrial Polyp    WISDOM TOOTH EXTRACTION       Social History   Social History     Substance and Sexual Activity   Alcohol Use Not Currently     Social History     Substance and Sexual Activity   Drug Use No     Social History     Tobacco Use   Smoking Status Never Smoker   Smokeless Tobacco Never Used     Family History   Problem Relation Age of Onset    Hypertension Mother     Arrhythmia Mother     Diabetes type II Mother     Prostate cancer Father 79    Hypertension Father     Rheum arthritis Father 77    Congenital Hearing Loss Father     Other Sister         pseudomyxoma peritonei    No Known Problems Sister     No Known Problems Sister     Parkinsonism Maternal Grandmother     Valvular heart disease Maternal Grandfather     No Known Problems Paternal Grandmother     Heart attack Paternal Grandfather     Coronary artery disease Paternal Grandfather     No Known Problems Maternal Aunt     No Known Problems Maternal Aunt     No Known Problems Paternal Aunt     Breast cancer Family          2 paternal great aunts late in life / both are decsd    Alcohol abuse Neg Hx     Substance Abuse Neg Hx     Depression Neg Hx     Mental illness Neg Hx        Meds/Allergies       Current Outpatient Medications:     ergocalciferol (ERGOCALCIFEROL) 1 25 MG (88635 UT) capsule    ibuprofen (MOTRIN) 200 mg tablet    loratadine (CLARITIN) 10 mg tablet    losartan (COZAAR) 100 MG tablet    Allergies   Allergen Reactions    Codeine Rash           Objective     Blood pressure 142/80, height 5' 5" (1 651 m), weight 98 9 kg (218 lb), not currently breastfeeding   Body mass index is 36 28 kg/m²         PHYSICAL EXAM:           Physical Exam  Vitals reviewed  Constitutional:       General: She is not in acute distress  Appearance: Normal appearance  She is not ill-appearing  HENT:      Head: Normocephalic and atraumatic  Nose: Nose normal       Mouth/Throat:      Mouth: Mucous membranes are moist       Pharynx: Oropharynx is clear  Eyes:      General: No scleral icterus  Extraocular Movements: Extraocular movements intact  Cardiovascular:      Rate and Rhythm: Normal rate and regular rhythm  Heart sounds: No murmur heard  Pulmonary:      Effort: Pulmonary effort is normal  No respiratory distress  Breath sounds: Normal breath sounds  Abdominal:      General: Abdomen is flat  Palpations: Abdomen is soft  There is no shifting dullness, fluid wave, hepatomegaly or splenomegaly  Tenderness: There is no abdominal tenderness  Hernia: No hernia is present  Genitourinary:     Comments: deferred  Musculoskeletal:         General: No swelling  Normal range of motion  Cervical back: Normal range of motion and neck supple  No tenderness  Skin:     General: Skin is warm  Coloration: Skin is not jaundiced  Findings: No bruising or rash  Neurological:      General: No focal deficit present  Mental Status: She is alert and oriented to person, place, and time     Psychiatric:         Mood and Affect: Mood normal               Lab Results:   Lab Results   Component Value Date    K 4 9 04/30/2022    CO2 31 04/30/2022     04/30/2022    BUN 15 04/30/2022    CREATININE 0 70 04/30/2022     Lab Results   Component Value Date    WBC 7 67 03/19/2022    HGB 12 1 03/19/2022    HCT 36 9 03/19/2022    MCV 92 03/19/2022     03/19/2022     Lab Results   Component Value Date    TP 7 0 04/30/2022    AST 17 04/30/2022    ALT 25 04/30/2022      No results found for: AFP, IRON, LABIRON, FERRITIN  Lab Results   Component Value Date    HDL 76 03/19/2022    TRIG 48 03/19/2022         Radiology Results:   I have reviewed the results of any radiology studies performed within the last 90 days  This includes:      No results found        Clau Samuels MD  Hepatology/Gastroenterology

## 2022-05-06 ENCOUNTER — CONSULT (OUTPATIENT)
Dept: CARDIOLOGY CLINIC | Facility: CLINIC | Age: 47
End: 2022-05-06
Payer: COMMERCIAL

## 2022-05-06 VITALS
HEIGHT: 65 IN | SYSTOLIC BLOOD PRESSURE: 146 MMHG | WEIGHT: 221.2 LBS | BODY MASS INDEX: 36.85 KG/M2 | DIASTOLIC BLOOD PRESSURE: 78 MMHG | HEART RATE: 54 BPM

## 2022-05-06 DIAGNOSIS — I51.7 LEFT ATRIAL DILATATION: ICD-10-CM

## 2022-05-06 DIAGNOSIS — I34.0 MITRAL VALVE INSUFFICIENCY, UNSPECIFIED ETIOLOGY: ICD-10-CM

## 2022-05-06 DIAGNOSIS — I10 HTN (HYPERTENSION), BENIGN: Primary | ICD-10-CM

## 2022-05-06 DIAGNOSIS — I07.1 TRICUSPID VALVE INSUFFICIENCY, UNSPECIFIED ETIOLOGY: ICD-10-CM

## 2022-05-06 PROCEDURE — 93000 ELECTROCARDIOGRAM COMPLETE: CPT | Performed by: INTERNAL MEDICINE

## 2022-05-06 PROCEDURE — 99204 OFFICE O/P NEW MOD 45 MIN: CPT | Performed by: INTERNAL MEDICINE

## 2022-05-06 RX ORDER — CHLORTHALIDONE 25 MG/1
25 TABLET ORAL DAILY
Qty: 30 TABLET | Refills: 5 | Status: SHIPPED | OUTPATIENT
Start: 2022-05-06

## 2022-05-06 NOTE — PROGRESS NOTES
Cardiology             Stephanie Cook  1975  [id number]            Reason for consultation:  Abnormal echocardiogram, hypertension    Assessment/Plan:    Hypertension   Reported abnormal echocardiogram  Mild dyslipidemia  Obesity, BMI 36      Home blood pressure log reviewed, systolic readings ranging from the 130s to 150s  Will continue losartan 100 mg daily  Will add chlorthalidone 25 mg p o  daily  She states she gets mild ankle edema which this will help with as well  Will check BMP in 2-3 weeks to ensure stability of sodium levels and potassium levels  She will continue checking home blood pressures and call me if they remain elevated  Avoid AV node blockers in the setting of resting sinus bradycardia, 54 beats per minute today  Personally reviewed echocardiogram images  I do not think she has severe left atrial dilatation  I suspect this was an erroneous measurement  She does have mild pulmonary hypertension which I suspect may be secondary to her systemic hypertension as her blood pressure during the echocardiogram was 160/88  I will likely repeat her echocardiogram after her blood pressure is optimized  Encouraged her to stop using ibuprofen as she is taking this twice daily for knee pain  Told her to give preference to Tylenol and speak with her PCP if she continues to have arthritis pain  Patient with very short episodic palpitations which she describes as a quick flutter which makes her gasp  I suspect this is related to PVCs or PACs  Do not think she needs cardiac monitoring at this time, as suspicion of cardiac dysrhythmia is low  If they persist or worsen, may consider cardiac monitoring  [facility]        Interval History: This is a very pleasant 17-year-old female with no prior cardiac history  Around November 2020 when she started having hypertension was initiated on losartan by her PCP    In light of a faint murmur, she underwent an echocardiogram 03/17/2022, which reported ejection fraction 65 percent  There was reported mild right ventricular dilatation with severe left atrial dilatation  There is mild pulmonary hypertension with estimated pulmonary systolic pressure 42 millimeters Hg  Blood pressure at the time of echocardiogram was elevated at 160/88  She presents today for initial consultation  From a symptomatic standpoint she feels fairly well  She feels fatigued after workouts in the gym although during her workup does not feel exertional dyspnea, chest discomfort  She will have an occasional palpitation which she states feels like a quick flutter in her chest that makes her gasping, only occurring for 1 or 2 seconds with no associated symptoms thereafter  She feels no heart racing or pounding  She admits that she has had slightly increased ankle edema after initiating losartan  She is a nonsmoker and does not drink alcohol  She tries to consume low-sodium diet  She takes ibuprofen twice on a daily basis due to her knee pain                 Social History:    Her mother is Idalia Herrera        Vitals:  Vitals:    05/06/22 0815   BP: 146/78   BP Location: Right arm   Patient Position: Sitting   Cuff Size: Large   Pulse: (!) 54   Weight: 100 kg (221 lb 3 2 oz)   Height: 5' 5" (1 651 m)         Past Medical History:   Diagnosis Date    Class 2 severe obesity with serious comorbidity and body mass index (BMI) of 36 0 to 36 9 in adult [facility])     Fatty liver 03/2022    Hepatomegaly 03/2022    Left atrial dilatation 03/2022    Mitral regurgitation 03/2022    Trace    OAB (overactive bladder)     Other hyperlipidemia 03/2022    Primary hypertension     Seasonal allergic rhinitis     Tricuspid regurgitation 03/2022    Vitamin D deficiency 03/2022     Social History     Socioeconomic History    Marital status: /Civil Union     Spouse name: Not on file    Number of children: 0    Years of education: Not on file    Highest education level: Not on file   Occupational History    Occupation:  in [employer]: [employer]   Tobacco Use    Smoking status: Never Smoker    Smokeless tobacco: Never Used   Vaping Use    Vaping Use: Never used   Substance and Sexual Activity    Alcohol use: Not Currently    Drug use: No    Sexual activity: Yes     Partners: Male     Birth control/protection: Condom   Other Topics Concern    Not on file   Social History Narrative        Lives with , mother-in-law    No Children     in [address] [place] [address] Loop Determinants of Health     Financial Resource Strain: Not on file   Food Insecurity: Not on file   Transportation Needs: Not on file   Physical Activity: Not on file   Stress: Not on file   Social Connections: Not on file   Intimate Partner Violence: Not on file   Housing Stability: Not on file      Family History   Problem Relation Age of Onset    Hypertension Mother     Arrhythmia Mother     Diabetes type II Mother     Prostate cancer Father 79    Hypertension Father     Rheum arthritis Father 77    Congenital Hearing Loss Father     Other Sister         pseudomyxoma peritonei    No Known Problems Sister     No Known Problems Sister     Parkinsonism Maternal Grandmother     Valvular heart disease Maternal Grandfather     No Known Problems Paternal Grandmother     Heart attack Paternal Grandfather     Coronary artery disease Paternal Grandfather     No Known Problems Maternal Aunt     No Known Problems Maternal Aunt     No Known Problems Paternal Aunt     Breast cancer Family          2 paternal great aunts late in life / both are decsd    Alcohol abuse Neg Hx     Substance Abuse Neg Hx     Depression Neg Hx     Mental illness Neg Hx      Past Surgical History:   Procedure Laterality Date    HYSTEROSCOPY W/ POLYPECTOMY      times 2/ 2016/ 2nd one aug 18/18; Endometrial Polyp    WISDOM TOOTH EXTRACTION         Current Outpatient Medications:     ergocalciferol (ERGOCALCIFEROL) 1 25 MG (11954 UT) capsule, Take 1 capsule (50,000 Units total) by mouth once a week for 12 doses, Disp: 12 capsule, Rfl: 0    ibuprofen (MOTRIN) 200 mg tablet, Take 200 mg by mouth 2 (two) times a day  , Disp: , Rfl:     loratadine (CLARITIN) 10 mg tablet, Take 10 mg by mouth daily as needed  , Disp: , Rfl:     losartan (COZAAR) 100 MG tablet, Take 1 tablet (100 mg total) by mouth daily, Disp: 30 tablet, Rfl: 1    Multiple Vitamins-Minerals (WOMENS MULTIVITAMIN PO), Take by mouth, Disp: , Rfl:     chlorthalidone 25 mg tablet, Take 1 tablet (25 mg total) by mouth daily, Disp: 30 tablet, Rfl: 5        Review of Systems:  Review of Systems   Constitutional: Negative for activity change, fever and unexpected weight change  HENT: Negative for facial swelling, nosebleeds and voice change  Respiratory: Negative for chest tightness, shortness of breath and wheezing  Cardiovascular: Negative for chest pain, palpitations and leg swelling  Gastrointestinal: Negative for abdominal distention  Genitourinary: Negative for hematuria  Musculoskeletal: Negative for arthralgias  Skin: Negative for color change, pallor, rash and wound  Neurological: Negative for dizziness, seizures and syncope  Psychiatric/Behavioral: Negative for agitation  Physical Exam:  Physical Exam  Vitals reviewed  Constitutional:       Appearance: She is well-developed  HENT:      Head: Normocephalic and atraumatic  Cardiovascular:      Rate and Rhythm: Normal rate and regular rhythm  Heart sounds: Normal heart sounds  Pulmonary:      Effort: Pulmonary effort is normal       Breath sounds: Normal breath sounds  Abdominal:      Palpations: Abdomen is soft  Musculoskeletal:         General: Normal range of motion  Cervical back: Normal range of motion and neck supple     Skin: General: Skin is warm and dry  Neurological:      Mental Status: She is alert and oriented to person, place, and time  Psychiatric:         Behavior: Behavior normal          Thought Content: Thought content normal          Judgment: Judgment normal          This note was completed in part utilizing M-Modal Fluency Direct Software  Grammatical errors, random word insertions, spelling mistakes, and incomplete sentences can be an occasional consequence of this system secondary to software limitations, ambient noise, and hardware issues  If you have any questions or concerns about the content, text, or information contained within the body of this dictation, please contact the provider for clarification

## 2022-05-10 ENCOUNTER — OFFICE VISIT (OUTPATIENT)
Dept: FAMILY MEDICINE CLINIC | Facility: CLINIC | Age: 47
End: 2022-05-10
Payer: COMMERCIAL

## 2022-05-10 VITALS
OXYGEN SATURATION: 99 % | WEIGHT: 214.4 LBS | HEIGHT: 65 IN | SYSTOLIC BLOOD PRESSURE: 138 MMHG | RESPIRATION RATE: 16 BRPM | BODY MASS INDEX: 35.72 KG/M2 | DIASTOLIC BLOOD PRESSURE: 78 MMHG | HEART RATE: 70 BPM | TEMPERATURE: 98.1 F

## 2022-05-10 DIAGNOSIS — I10 PRIMARY HYPERTENSION: Primary | ICD-10-CM

## 2022-05-10 DIAGNOSIS — Z12.11 SCREENING FOR COLORECTAL CANCER: ICD-10-CM

## 2022-05-10 DIAGNOSIS — Z12.12 SCREENING FOR COLORECTAL CANCER: ICD-10-CM

## 2022-05-10 DIAGNOSIS — K76.0 FATTY LIVER: ICD-10-CM

## 2022-05-10 DIAGNOSIS — E66.01 CLASS 2 SEVERE OBESITY WITH SERIOUS COMORBIDITY AND BODY MASS INDEX (BMI) OF 35.0 TO 35.9 IN ADULT, UNSPECIFIED OBESITY TYPE (HCC): ICD-10-CM

## 2022-05-10 PROCEDURE — 99214 OFFICE O/P EST MOD 30 MIN: CPT | Performed by: FAMILY MEDICINE

## 2022-05-10 PROCEDURE — 3008F BODY MASS INDEX DOCD: CPT | Performed by: FAMILY MEDICINE

## 2022-05-10 RX ORDER — LOSARTAN POTASSIUM 100 MG/1
100 TABLET ORAL DAILY
Qty: 90 TABLET | Refills: 2 | Status: SHIPPED | OUTPATIENT
Start: 2022-05-10 | End: 2022-08-10 | Stop reason: SDUPTHER

## 2022-05-10 NOTE — PROGRESS NOTES
Assessment/Plan:  Problem List Items Addressed This Visit        Digestive    Fatty liver     Management per GI  Recommend lifestyle modifications  Cardiovascular and Mediastinum    Primary hypertension - Primary     Improved, with borderline BP s/p recent initiation of Chlorthalidone 25mg QD  Continue Losartan 100mg QD  Check blood pressure outside of office  Recommend lifestyle modifications  Relevant Medications    losartan (COZAAR) 100 MG tablet       Other    Class 2 severe obesity with serious comorbidity and body mass index (BMI) of 35 0 to 35 9 in adult [facility])     Improved  Recommend lifestyle modifications  Other Visit Diagnoses     Screening for colorectal cancer        Relevant Orders    Cologuard           Return in about 3 months (around 7/31/2022) for 4mo - HTN, HL, Vit D Def, Labs  Future Appointments   Date Time Provider Ramiro Reed   6/29/2022  4:00 PM He Mac DO [facility]   8/4/2022  2:00 PM Fawad Mitchell MD [facility]   8/10/2022  8:20 AM Jennifer Savage DO [facility]        Subjective:     Donnie Rubio is a 50 y o  female who presents today for a follow-up on her chronic medical conditions  HPI:  Chief Complaint   Patient presents with    Follow-up     6 week follow up    Medication Refill     pending    Labs Only     03/19/22    HM     -- Above per clinical staff and reviewed  --      HPI      Today:      Return in about 6 weeks (around 5/12/2022) for F/U HTN, Vit D Def, Labs; 4mo - HTN, HL, Vit D Def, Labs         Obesity - Watching diet   She not adding salt to food   Does intermittent fasting during the work week   +Exercise - Gym -  / Weights / Ryan Monks / Class for 1 hour, 6 times per week        HTN -  On increased Losartan 100mg QD x 6 weeks  Cardio started Chlorthalidone 25mg QD on 5/6/22  BP check outside of office on arm cuff 137/68  Mike Tang     Hyperlipidemia - No statin previously     Vitamin D Deficiency - On Drisdol  Taking MVI, but not OTC Vitamin D         RUQ Abdominal Pain / Fatty Liver - Management per GI Dr Randy Quarles  Next appt 8/22  s/p RUQ U/S on 3/9/22 - stable gallbladder  Pending NAVA Fibrosure and abdominal ultrasound elastography with fat quantification (UGAP) to further assess hepatic fibrosis and steatosis in a non-invasive manner     No Left Atrial Dilation (Measurement Error per Cardio) /Mitral Regurgitation / Tricuspid Regurgitation - Management per Cardio Dr Penny Wheatley  Next appt 6/22  Pending repeat Echo when HTN is controlled  Did not receive Cologard ordered 2/15/22 - reordered        From previous note:     Controlled Substance Review     [place] or [place]  reviewed: No red flags were identified; safe to proceed with prescription       Obesity - Watching diet   She not adding salt to food   Does intermittent fasting during the work week   +Exercise - Gym  -  / Weights / Cardio / Class for 1 hour, 6 times per week        HTN -  /102 on 11/1721 at [facility]   BP check outside of office on arm cuff 160's/70/s        Heart Murmur - [place] by Gyn at appt on 11/17/21   No Echo previously        OAB - Management per AYSE Bacon   Next appt 11/22   No meds        AR - Stable on Claritin PRN              Reviewed:  Labs 3/19/22, Gyn 11/17/21     Sees Gyn AYSE Bacon at [facility] for [address] yearly   Next appt 11/22        No CRC previously        Sees Dentist q6 months  Sees Optho q2 years  From previous note:    Return in about 6 weeks (around 3/29/2022) for F/U HTN, RUQ Abd Pain, Murmur, Labs         Obesity - Watching diet   She not adding salt to food   Does intermittent fasting during the work week   +Exercise - Gym  -  / Weights / Cardio / Class for 1 hour, 6 times per week        HTN -  On Losartan 50mg QD x 6 weeks    BP check outside of office on arm cuff 140's/70's        Hyperlipidemia - No statin previously        Vitamin D Deficiency - No MVI or OTC Vitamin D         RUQ Abdominal Pain / Fatty Liver - s/p RUQ U/S on 3/9/22 - stable gallbladder  Referred to GI 3/12/22 - no appt scheduled          Left Atrial Dilation / Mitral Regurgitation / Tricuspid Regurgitation - Referred to Cardio 3/17/22 - no appt scheduled                 From previous note:     Controlled Substance Review     [place]  reviewed: No red flags were identified; safe to proceed with prescription       Obesity - Watching diet   She not adding salt to food   Does intermittent fasting during the work week   +Exercise - Gym  -  / Weights / Cardio / Class for 1 hour, 6 times per week        HTN -  /102 on 11/1721 at [facility]   BP check outside of office on arm cuff 160's/70/s        Heart Murmur - [place] by Gyn at appt on 11/17/21   No Echo previously        OAB - Management per AYSE Berry   Next appt 11/22   No meds        AR - Stable on Claritin PRN              Reviewed:  Labs 4/30/22, Gyn 11/17/21, GI 5/5/22, Cardio 5/6/22     Sees Gyn Ms  AYSE Stovall at [facility] for [address] yearly   Next appt 11/22        No CRC previously        Sees Dentist q6 months  Sees Optho q2 years  The following portions of the patient's history were reviewed and updated as appropriate: allergies, current medications, past family history, past medical history, past social history, past surgical history and problem list       Review of Systems   Constitutional: Negative for appetite change, chills, diaphoresis, fatigue and fever  Respiratory: Negative for chest tightness and shortness of breath  Cardiovascular: Negative for chest pain  Gastrointestinal: Negative for abdominal pain, blood in stool, diarrhea, nausea and vomiting  Genitourinary: Negative for dysuria          Current Outpatient Medications   Medication Sig Dispense Refill  chlorthalidone 25 mg tablet Take 1 tablet (25 mg total) by mouth daily (Patient taking differently: Take 25 mg by mouth daily Rx per Cardio ) 30 tablet 5    ergocalciferol (ERGOCALCIFEROL) 1 25 MG (25222 UT) capsule Take 1 capsule (50,000 Units total) by mouth once a week for 12 doses (Patient taking differently: Take 50,000 Units by mouth once a week  ) 12 capsule 0    ibuprofen (MOTRIN) 200 mg tablet Take 200 mg by mouth 2 (two) times a day        loratadine (CLARITIN) 10 mg tablet Take 10 mg by mouth daily as needed        Multiple Vitamins-Minerals (WOMENS MULTIVITAMIN PO) Take 1 tablet by mouth in the morning        losartan (COZAAR) 100 MG tablet Take 1 tablet (100 mg total) by mouth daily 90 tablet 2     No current facility-administered medications for this visit  Objective:  /78   Pulse 70   Temp 98 1 °F (36 7 °C)   Resp 16   Ht 5' 5" (1 651 m)   Wt 97 3 kg (214 lb 6 4 oz)   SpO2 99%   BMI 35 68 kg/m²    Wt Readings from Last 3 Encounters:   05/10/22 97 3 kg (214 lb 6 4 oz)   05/06/22 100 kg (221 lb 3 2 oz)   05/05/22 98 9 kg (218 lb)      BP Readings from Last 3 Encounters:   05/10/22 138/78   05/06/22 146/78   05/05/22 142/80          Physical Exam  Vitals and nursing note reviewed  Constitutional:       Appearance: Normal appearance  She is well-developed  She is obese  HENT:      Head: Normocephalic and atraumatic  Eyes:      Conjunctiva/sclera: Conjunctivae normal    Neck:      Thyroid: No thyromegaly  Cardiovascular:      Rate and Rhythm: Normal rate and regular rhythm  Pulses: Normal pulses  Heart sounds: Normal heart sounds  Pulmonary:      Effort: Pulmonary effort is normal       Breath sounds: Normal breath sounds  Musculoskeletal:         General: No swelling  Cervical back: Neck supple  Right lower leg: No edema  Left lower leg: No edema  Neurological:      General: No focal deficit present        Mental Status: She is alert and oriented to person, place, and time  Psychiatric:         Mood and Affect: Mood normal          Lab Results:      Lab Results   Component Value Date    WBC 7 67 03/19/2022    HGB 12 1 03/19/2022    HCT 36 9 03/19/2022     03/19/2022    TRIG 48 03/19/2022    HDL 76 03/19/2022    LDLDIRECT 131 (H) 03/19/2022    ALT 25 04/30/2022    AST 17 04/30/2022    K 4 9 04/30/2022     04/30/2022    CREATININE 0 70 04/30/2022    BUN 15 04/30/2022    CO2 31 04/30/2022    GLUF 126 (H) 04/30/2022    HGBA1C 5 5 03/19/2022     No results found for: URICACID  Invalid input(s): BASENAME Vitamin D    No results found       POCT Labs

## 2022-05-10 NOTE — PATIENT INSTRUCTIONS
If ok with GI Dr Sonia Cool [place] may use Tylenol (Acetaminophen) up to 3,000mg daily (in 24 hours) as needed for pain or fever

## 2022-05-11 NOTE — ASSESSMENT & PLAN NOTE
Improved, with borderline BP s/p recent initiation of Chlorthalidone 25mg QD  Continue Losartan 100mg QD  Check blood pressure outside of office  Recommend lifestyle modifications

## 2022-06-10 ENCOUNTER — HOSPITAL ENCOUNTER (OUTPATIENT)
Dept: ULTRASOUND IMAGING | Facility: HOSPITAL | Age: 47
Discharge: HOME | End: 2022-06-10
Attending: INTERNAL MEDICINE
Payer: COMMERCIAL

## 2022-06-10 DIAGNOSIS — K76.0 FATTY LIVER: ICD-10-CM

## 2022-06-10 DIAGNOSIS — R16.0 HEPATOMEGALY: ICD-10-CM

## 2022-06-10 DIAGNOSIS — R10.31 RLQ ABDOMINAL PAIN: ICD-10-CM

## 2022-06-10 PROCEDURE — 76981 USE PARENCHYMA: CPT

## 2022-06-25 ENCOUNTER — LAB (OUTPATIENT)
Dept: LAB | Facility: CLINIC | Age: 47
End: 2022-06-25
Payer: COMMERCIAL

## 2022-06-25 DIAGNOSIS — R10.31 RLQ ABDOMINAL PAIN: ICD-10-CM

## 2022-06-25 DIAGNOSIS — K76.0 FATTY LIVER: ICD-10-CM

## 2022-06-25 DIAGNOSIS — E55.9 VITAMIN D DEFICIENCY: ICD-10-CM

## 2022-06-25 DIAGNOSIS — R16.0 HEPATOMEGALY: ICD-10-CM

## 2022-06-25 LAB
25(OH)D3 SERPL-MCNC: 61.4 NG/ML (ref 30–100)
ANION GAP SERPL CALCULATED.3IONS-SCNC: 6 MMOL/L (ref 4–13)
BUN SERPL-MCNC: 26 MG/DL (ref 5–25)
CALCIUM SERPL-MCNC: 9.1 MG/DL (ref 8.3–10.1)
CHLORIDE SERPL-SCNC: 108 MMOL/L (ref 100–108)
CO2 SERPL-SCNC: 26 MMOL/L (ref 21–32)
CREAT SERPL-MCNC: 0.73 MG/DL (ref 0.6–1.3)
GFR SERPL CREATININE-BSD FRML MDRD: 97 ML/MIN/1.73SQ M
GLUCOSE P FAST SERPL-MCNC: 126 MG/DL (ref 65–99)
POTASSIUM SERPL-SCNC: 3.9 MMOL/L (ref 3.5–5.3)
SODIUM SERPL-SCNC: 140 MMOL/L (ref 136–145)

## 2022-06-25 PROCEDURE — 80048 BASIC METABOLIC PNL TOTAL CA: CPT | Performed by: INTERNAL MEDICINE

## 2022-06-25 PROCEDURE — 82306 VITAMIN D 25 HYDROXY: CPT

## 2022-06-25 PROCEDURE — 82172 ASSAY OF APOLIPOPROTEIN: CPT

## 2022-06-25 PROCEDURE — 82465 ASSAY BLD/SERUM CHOLESTEROL: CPT

## 2022-06-25 PROCEDURE — 84460 ALANINE AMINO (ALT) (SGPT): CPT

## 2022-06-25 PROCEDURE — 84450 TRANSFERASE (AST) (SGOT): CPT

## 2022-06-25 PROCEDURE — 82947 ASSAY GLUCOSE BLOOD QUANT: CPT

## 2022-06-25 PROCEDURE — 36415 COLL VENOUS BLD VENIPUNCTURE: CPT | Performed by: INTERNAL MEDICINE

## 2022-06-25 PROCEDURE — 83883 ASSAY NEPHELOMETRY NOT SPEC: CPT

## 2022-06-25 PROCEDURE — 82977 ASSAY OF GGT: CPT

## 2022-06-25 PROCEDURE — 82247 BILIRUBIN TOTAL: CPT

## 2022-06-25 PROCEDURE — 84478 ASSAY OF TRIGLYCERIDES: CPT

## 2022-06-25 PROCEDURE — 83010 ASSAY OF HAPTOGLOBIN QUANT: CPT

## 2022-06-26 NOTE — RESULT ENCOUNTER NOTE
Stable labs - please call patient  Normal Vitamin D - Recommend start multivitamin and over-the-counter vitamin D3 1000 - 3000 International Units daily

## 2022-06-29 ENCOUNTER — OFFICE VISIT (OUTPATIENT)
Dept: CARDIOLOGY CLINIC | Facility: CLINIC | Age: 47
End: 2022-06-29
Payer: COMMERCIAL

## 2022-06-29 VITALS
DIASTOLIC BLOOD PRESSURE: 70 MMHG | WEIGHT: 218.6 LBS | SYSTOLIC BLOOD PRESSURE: 130 MMHG | BODY MASS INDEX: 36.38 KG/M2 | HEART RATE: 68 BPM

## 2022-06-29 DIAGNOSIS — E78.5 DYSLIPIDEMIA: ICD-10-CM

## 2022-06-29 DIAGNOSIS — R00.2 PALPITATIONS: ICD-10-CM

## 2022-06-29 DIAGNOSIS — I10 BENIGN ESSENTIAL HTN: Primary | ICD-10-CM

## 2022-06-29 LAB
A2 MACROGLOB SERPL-MCNC: 134 MG/DL (ref 110–276)
ALT SERPL W P-5'-P-CCNC: 17 IU/L (ref 0–40)
APO A-I SERPL-MCNC: 177 MG/DL (ref 116–209)
AST SERPL W P-5'-P-CCNC: 17 IU/L (ref 0–40)
BILIRUB SERPL-MCNC: 0.3 MG/DL (ref 0–1.2)
CHOLEST SERPL-MCNC: 244 MG/DL (ref 100–199)
FIBROSIS SCORING:: ABNORMAL
FIBROSIS STAGE SERPL QL: ABNORMAL
GGT SERPL-CCNC: 10 IU/L (ref 0–60)
GLUCOSE SERPL-MCNC: 123 MG/DL (ref 65–99)
HAPTOGLOB SERPL-MCNC: 132 MG/DL (ref 42–296)
LABORATORY COMMENT REPORT: ABNORMAL
LIVER FIBR SCORE SERPL CALC.FIBROSURE: 0.03 (ref 0–0.21)
NECROINFLAMMATORY ACT GRADE SERPL QL: ABNORMAL
NECROINFLAMMATORY ACT SCORE SERPL: 0.25
SERVICE CMNT-IMP: ABNORMAL
SL AMB INTERPRETATION: ABNORMAL
SL AMB NASH SCORING: ABNORMAL
SL AMB STEATOSIS GRADE: ABNORMAL
SL AMB STEATOSIS SCORE: 0.34 (ref 0–0.3)
STEATOSIS GRADING: ABNORMAL
TRIGL SERPL-MCNC: 73 MG/DL (ref 0–149)

## 2022-06-29 PROCEDURE — 99214 OFFICE O/P EST MOD 30 MIN: CPT | Performed by: INTERNAL MEDICINE

## 2022-06-29 NOTE — PROGRESS NOTES
Cardiology             Cy [place]  1975  [id number]                Assessment/Plan:     Hypertension   Reported abnormal echocardiogram  Mild dyslipidemia  Obesity, BMI 36        Significantly improved blood pressure readings with systolic readings in the 610G to 140s  I have encouraged continued blood pressure monitoring at home  If she finds that her systolic readings are typically in the 140s, I have encouraged her to call me in which case nifedipine or amlodipine may be considered  For now, continue chlorthalidone and losartan  Will avoid AV node blockers in the setting of resting sinus bradycardia  Prior echocardiogram with mild pulmonary hypertension likely secondary to ongoing systemic hypertension  Blood pressure at time of study was 160/88  May consider repeating at some point             Follow-up in 6 months           Interval History:      This is a very pleasant 77-year-old female with no prior cardiac history  Around November 2020 when she started having hypertension was initiated on losartan by her PCP  In light of a faint murmur, she underwent an echocardiogram 03/17/2022, which reported ejection fraction 65 percent  There was reported mild right ventricular dilatation with severe left atrial dilatation  There is mild pulmonary hypertension with estimated pulmonary systolic pressure 42 millimeters Hg  Blood pressure at the time of echocardiogram was elevated at 160/88        After initial consultation 05/2022, chlorthalidone was added for better blood pressure control  She was encouraged to stop ibuprofen which she was using twice daily for knee pain and arthritis  Follow-up BMP 06/25/2022 demonstrated stable potassium at 3 9 and sodium at 140  BUN was slightly increased at 26 mg/dL  She presents today for follow-up  She has been monitoring her blood pressures at home and for the most part systolic readings have been in the 130s to 140s    From a symptomatic standpoint she feels well            Vitals:  Vitals:    06/29/22 1607   BP: 130/70   BP Location: Left arm   Patient Position: Sitting   Cuff Size: Large   Pulse: 68   Weight: 99 2 kg (218 lb 9 6 oz)         Past Medical History:   Diagnosis Date    Class 2 severe obesity with serious comorbidity and body mass index (BMI) of 36 0 to 36 9 in adult [facility])     Fatty liver 03/2022    Hepatomegaly 03/2022    Left atrial dilatation 03/2022    Mitral regurgitation 03/2022    Trace    OAB (overactive bladder)     Other hyperlipidemia 03/2022    Primary hypertension     Seasonal allergic rhinitis     Tricuspid regurgitation 03/2022    Vitamin D deficiency 03/2022     Social History     Socioeconomic History    Marital status: /Civil Union     Spouse name: Not on file    Number of children: 0    Years of education: Not on file    Highest education level: Not on file   Occupational History    Occupation:  in [employer]: [employer]   Tobacco Use    Smoking status: Never Smoker    Smokeless tobacco: Never Used   Vaping Use    Vaping Use: Never used   Substance and Sexual Activity    Alcohol use: Not Currently    Drug use: No    Sexual activity: Yes     Partners: Male     Birth control/protection: Condom   Other Topics Concern    Not on file   Social History Narrative        Lives with , mother-in-law    No Children     in [address] [facility] [address]     Social Determinants of Health     Financial Resource Strain: Not on file   Food Insecurity: Not on file   Transportation Needs: Not on file   Physical Activity: Not on file   Stress: Not on file   Social Connections: Not on file   Intimate Partner Violence: Not on file   Housing Stability: Not on file      Family History   Problem Relation Age of Onset    Hypertension Mother     Arrhythmia Mother     Diabetes type II Mother     Prostate cancer Father 79    Hypertension Father     Rheum arthritis Father 77    Congenital Hearing Loss Father     Other Sister         pseudomyxoma peritonei    No Known Problems Sister     No Known Problems Sister     Parkinsonism Maternal Grandmother     Valvular heart disease Maternal Grandfather     No Known Problems Paternal Grandmother     Heart attack Paternal Grandfather     Coronary artery disease Paternal Grandfather     No Known Problems Maternal Aunt     No Known Problems Maternal Aunt     No Known Problems Paternal Aunt     Breast cancer Family          2 paternal great aunts late in life / both are decsd    Alcohol abuse Neg Hx     Substance Abuse Neg Hx     Depression Neg Hx     Mental illness Neg Hx      Past Surgical History:   Procedure Laterality Date    HYSTEROSCOPY W/ POLYPECTOMY      times 2/ 2016/ 2nd one aug 18/18; Endometrial Polyp    WISDOM TOOTH EXTRACTION         Current Outpatient Medications:     chlorthalidone 25 mg tablet, Take 1 tablet (25 mg total) by mouth daily (Patient taking differently: Take 25 mg by mouth daily Rx per Cardio), Disp: 30 tablet, Rfl: 5    GLUCOSAMINE-CALCIUM-VIT D PO, Take by mouth, Disp: , Rfl:     loratadine (CLARITIN) 10 mg tablet, Take 10 mg by mouth daily as needed  , Disp: , Rfl:     losartan (COZAAR) 100 MG tablet, Take 1 tablet (100 mg total) by mouth daily, Disp: 90 tablet, Rfl: 2    Multiple Vitamins-Minerals (WOMENS MULTIVITAMIN PO), Take 1 tablet by mouth in the morning  , Disp: , Rfl:     ergocalciferol (ERGOCALCIFEROL) 1 25 MG (39416 UT) capsule, Take 1 capsule (50,000 Units total) by mouth once a week for 12 doses (Patient taking differently: Take 50,000 Units by mouth once a week  ), Disp: 12 capsule, Rfl: 0    ibuprofen (MOTRIN) 200 mg tablet, Take 200 mg by mouth 2 (two) times a day   (Patient not taking: Reported on 6/29/2022), Disp: , Rfl:         Review of Systems:  Review of Systems   Constitutional: Negative for activity change, chills, fever and unexpected weight change  HENT: Negative for ear pain, facial swelling, nosebleeds, sore throat and voice change  Eyes: Negative for pain and visual disturbance  Respiratory: Negative for cough, chest tightness, shortness of breath and wheezing  Cardiovascular: Negative for chest pain, palpitations and leg swelling  Gastrointestinal: Negative for abdominal distention, abdominal pain and vomiting  Genitourinary: Negative for dysuria and hematuria  Musculoskeletal: Negative for arthralgias and back pain  Skin: Negative for color change, pallor, rash and wound  Neurological: Negative for dizziness, seizures and syncope  Psychiatric/Behavioral: Negative for agitation  All other systems reviewed and are negative  Physical Exam:  Physical Exam  Vitals reviewed  Constitutional:       Appearance: She is well-developed  HENT:      Head: Normocephalic and atraumatic  Cardiovascular:      Rate and Rhythm: Normal rate and regular rhythm  Heart sounds: Normal heart sounds  Pulmonary:      Effort: Pulmonary effort is normal       Breath sounds: Normal breath sounds  Abdominal:      Palpations: Abdomen is soft  Musculoskeletal:         General: Normal range of motion  Cervical back: Normal range of motion and neck supple  Skin:     General: Skin is warm and dry  Neurological:      Mental Status: She is alert and oriented to person, place, and time  Psychiatric:         Behavior: Behavior normal          Thought Content: Thought content normal          Judgment: Judgment normal          This note was completed in part utilizing [employer] Direct Software  Grammatical errors, random word insertions, spelling mistakes, and incomplete sentences can be an occasional consequence of this system secondary to software limitations, ambient noise, and hardware issues    If you have any questions or concerns about the content, text, or information contained within the body of this dictation, please contact the provider for clarification

## 2022-07-26 ENCOUNTER — RA CDI HCC (OUTPATIENT)
Dept: OTHER | Facility: HOSPITAL | Age: 47
End: 2022-07-26

## 2022-07-26 NOTE — PROGRESS NOTES
[facility]  coding opportunities       Chart reviewed, no opportunity found: CHART REVIEWED, NO OPPORTUNITY FOUND        Patients Insurance        Commercial Insurance: [address] [place] [address]

## 2022-07-30 ENCOUNTER — LAB (OUTPATIENT)
Dept: LAB | Facility: CLINIC | Age: 47
End: 2022-07-30
Payer: COMMERCIAL

## 2022-07-30 DIAGNOSIS — I10 PRIMARY HYPERTENSION: ICD-10-CM

## 2022-07-30 DIAGNOSIS — Z12.12 SCREENING FOR COLORECTAL CANCER: ICD-10-CM

## 2022-07-30 DIAGNOSIS — E78.49 OTHER HYPERLIPIDEMIA: ICD-10-CM

## 2022-07-30 DIAGNOSIS — E66.01 CLASS 2 SEVERE OBESITY WITH SERIOUS COMORBIDITY AND BODY MASS INDEX (BMI) OF 36.0 TO 36.9 IN ADULT, UNSPECIFIED OBESITY TYPE (HCC): ICD-10-CM

## 2022-07-30 DIAGNOSIS — Z12.11 SCREENING FOR COLORECTAL CANCER: ICD-10-CM

## 2022-07-30 DIAGNOSIS — K76.0 FATTY LIVER: ICD-10-CM

## 2022-07-30 DIAGNOSIS — E55.9 VITAMIN D DEFICIENCY: ICD-10-CM

## 2022-07-30 LAB
ALBUMIN SERPL BCP-MCNC: 3.9 G/DL (ref 3.5–5)
ALP SERPL-CCNC: 75 U/L (ref 46–116)
ALT SERPL W P-5'-P-CCNC: 22 U/L (ref 12–78)
ANION GAP SERPL CALCULATED.3IONS-SCNC: 3 MMOL/L (ref 4–13)
AST SERPL W P-5'-P-CCNC: 17 U/L (ref 5–45)
BILIRUB SERPL-MCNC: 0.5 MG/DL (ref 0.2–1)
BUN SERPL-MCNC: 23 MG/DL (ref 5–25)
CALCIUM SERPL-MCNC: 9.1 MG/DL (ref 8.3–10.1)
CHLORIDE SERPL-SCNC: 110 MMOL/L (ref 96–108)
CHOLEST SERPL-MCNC: 232 MG/DL
CO2 SERPL-SCNC: 29 MMOL/L (ref 21–32)
CREAT SERPL-MCNC: 0.74 MG/DL (ref 0.6–1.3)
GFR SERPL CREATININE-BSD FRML MDRD: 95 ML/MIN/1.73SQ M
GLUCOSE P FAST SERPL-MCNC: 119 MG/DL (ref 65–99)
HDLC SERPL-MCNC: 69 MG/DL
LDLC SERPL CALC-MCNC: 155 MG/DL (ref 0–100)
LDLC SERPL DIRECT ASSAY-MCNC: 148 MG/DL (ref 0–100)
NONHDLC SERPL-MCNC: 163 MG/DL
POTASSIUM SERPL-SCNC: 4.2 MMOL/L (ref 3.5–5.3)
PROT SERPL-MCNC: 7.1 G/DL (ref 6.4–8.4)
SODIUM SERPL-SCNC: 142 MMOL/L (ref 135–147)
TRIGL SERPL-MCNC: 38 MG/DL
TSH SERPL DL<=0.05 MIU/L-ACNC: 1.37 UIU/ML (ref 0.45–4.5)

## 2022-07-30 PROCEDURE — 80053 COMPREHEN METABOLIC PANEL: CPT

## 2022-07-30 PROCEDURE — 80061 LIPID PANEL: CPT

## 2022-07-30 PROCEDURE — 83721 ASSAY OF BLOOD LIPOPROTEIN: CPT

## 2022-07-30 PROCEDURE — 84443 ASSAY THYROID STIM HORMONE: CPT

## 2022-07-30 PROCEDURE — 36415 COLL VENOUS BLD VENIPUNCTURE: CPT

## 2022-08-01 NOTE — RESULT ENCOUNTER NOTE
Stable labs - will review with patient at upcoming appointment  Hyperlipidemia - Recommend lifestyle modifications        The 10-year ASCVD risk score (Johann Sher et al , 2013) is: 1 4%    Values used to calculate the score:      Age: 50 years      Sex: Female      Is Non- : No      Diabetic: No      Tobacco smoker: No      Systolic Blood Pressure: 321 mmHg      Is BP treated: Yes      HDL Cholesterol: 69 mg/dL      Total Cholesterol: 232 mg/dL

## 2022-08-04 ENCOUNTER — OFFICE VISIT (OUTPATIENT)
Dept: GASTROENTEROLOGY | Facility: AMBULARY SURGERY CENTER | Age: 47
End: 2022-08-04
Payer: COMMERCIAL

## 2022-08-04 VITALS
WEIGHT: 215 LBS | BODY MASS INDEX: 35.82 KG/M2 | OXYGEN SATURATION: 99 % | HEART RATE: 67 BPM | SYSTOLIC BLOOD PRESSURE: 152 MMHG | HEIGHT: 65 IN | DIASTOLIC BLOOD PRESSURE: 82 MMHG

## 2022-08-04 DIAGNOSIS — K76.0 FATTY LIVER: Primary | ICD-10-CM

## 2022-08-04 PROCEDURE — 99214 OFFICE O/P EST MOD 30 MIN: CPT | Performed by: INTERNAL MEDICINE

## 2022-08-04 NOTE — PROGRESS NOTES
[facility] - Outpatient Follow-up  Pedro Lassiter 50 y o  female MRN: [id number]  Encounter: [id number]        ASSESSMENT AND PLAN     1  Fatty liver  · No fibrosis on elastography, S1 steatosis  · NAVA fibrosure also showing similar results  · Weight loss and diet advised  · Will refer patient to weight management  - Ambulatory Referral to Weight Management; Future    2  Colon cancer screening  · Cologuard test ordered and is pending   · Patient is reluctant to have a colonoscopy     Orders Placed This Encounter   Procedures    Ambulatory Referral to Weight Management         HISTORY OF PRESENT ILLNESS     Patient is here to follow up on her ultrasound elastography performed for fatty liver disease  She is currently feeling well and denies any symptoms  Patient denies abdominal pain, nausea, vomiting, heartburn, dysphagia, constipation, diarrhea, blood in stools  She has not had a colonoscopy for colon cancer screening  She was recently diagnosed with high blood pressure and started on treatment but reportedly they are adjusting her medication regimen because her blood pressures are still high        REVIEW OF SYSTEMS     Negative other than HPI      Historical information     Past Medical History:   Diagnosis Date    Class 2 severe obesity with serious comorbidity and body mass index (BMI) of 36 0 to 36 9 in [facility])     Fatty liver 03/2022    Hepatomegaly 03/2022    Left atrial dilatation 03/2022    Mitral regurgitation 03/2022    Trace    OAB (overactive bladder)     Other hyperlipidemia 03/2022    Primary hypertension     Seasonal allergic rhinitis     Tricuspid regurgitation 03/2022    Vitamin D deficiency 03/2022     Past Surgical History:   Procedure Laterality Date    HYSTEROSCOPY W/ POLYPECTOMY      times 2/ 2016/ 2nd one aug 18/18; Endometrial Polyp    WISDOM TOOTH EXTRACTION       Social History   Social History     Substance and Sexual Activity Alcohol Use Not Currently     Social History     Substance and Sexual Activity   Drug Use No     Social History     Tobacco Use   Smoking Status Never Smoker   Smokeless Tobacco Never Used     Family History   Problem Relation Age of Onset    Hypertension Mother     Arrhythmia Mother     Diabetes type II Mother     Prostate cancer Father 79    Hypertension Father     Rheum arthritis Father 77    Congenital Hearing Loss Father     Other Sister         pseudomyxoma peritonei    No Known Problems Sister     No Known Problems Sister     Parkinsonism Maternal Grandmother     Valvular heart disease Maternal Grandfather     No Known Problems Paternal Grandmother     Heart attack Paternal Grandfather     Coronary artery disease Paternal Grandfather     No Known Problems Maternal Aunt     No Known Problems Maternal Aunt     No Known Problems Paternal Aunt     Breast cancer Family          2 paternal great aunts late in life / both are decsd    Alcohol abuse Neg Hx     Substance Abuse Neg Hx     Depression Neg Hx     Mental illness Neg Hx        Allergies       Current Outpatient Medications:     chlorthalidone 25 mg tablet    GLUCOSAMINE-CALCIUM-VIT D PO    loratadine (CLARITIN) 10 mg tablet    losartan (COZAAR) 100 MG tablet    Multiple Vitamins-Minerals (WOMENS MULTIVITAMIN PO)    ergocalciferol (ERGOCALCIFEROL) 1 25 MG (27792 UT) capsule    ibuprofen (MOTRIN) 200 mg tablet    Allergies   Allergen Reactions    Codeine Rash           Objective assessment       Blood pressure 152/82, pulse 67, height 5' 5" (1 651 m), weight 97 5 kg (215 lb), SpO2 99 %, not currently breastfeeding  Body mass index is 35 78 kg/m²  PHYSICAL EXAM:         General Appearance:   Alert, cooperative, no distress   HEENT:   Normocephalic, atraumatic, anicteric       Neck:  Supple, symmetrical, trachea midline   Lungs:   Clear to auscultation bilaterally; no rales, rhonchi or wheezing; respirations unlabored Heart[de-identified]   Regular rate and rhythm; no murmur, rub, or gallop  Abdomen:   Soft, non-tender, non-distended; normal bowel sounds; no masses, no organomegaly    Genitalia:   Deferred    Rectal:   Deferred    Extremities:  No cyanosis, clubbing or edema    Pulses:  2+ and symmetric    Skin:  No jaundice, rashes, or lesions    Lymph nodes:  No palpable cervical lymphadenopathy        Lab Results:      No visits with results within 1 Day(s) from this visit  Latest known visit with results is:   Lab on 07/30/2022   Component Date Value    Sodium 07/30/2022 142     Potassium 07/30/2022 4 2     Chloride 07/30/2022 110 (A)    CO2 07/30/2022 29     ANION GAP 07/30/2022 3 (A)    BUN 07/30/2022 23     Creatinine 07/30/2022 0 74     Glucose, Fasting 07/30/2022 119 (A)    Calcium 07/30/2022 9 1     AST 07/30/2022 17     ALT 07/30/2022 22     Alkaline Phosphatase 07/30/2022 75     Total Protein 07/30/2022 7 1     Albumin 07/30/2022 3 9     Total Bilirubin 07/30/2022 0 50     eGFR 07/30/2022 95     Cholesterol 07/30/2022 232 (A)    Triglycerides 07/30/2022 38     HDL, Direct 07/30/2022 69     LDL Calculated 07/30/2022 155 (A)    Non-HDL-Chol (CHOL-HDL) 07/30/2022 163     TSH 3RD GENERATON 07/30/2022 1 370     LDL Direct 07/30/2022 148 (A)         Radiology Results:      No results found        Mulugeta Edmond MD  Gastroenterology Fellow

## 2022-08-10 ENCOUNTER — OFFICE VISIT (OUTPATIENT)
Dept: FAMILY MEDICINE CLINIC | Facility: CLINIC | Age: 47
End: 2022-08-10
Payer: COMMERCIAL

## 2022-08-10 VITALS
BODY MASS INDEX: 35.82 KG/M2 | OXYGEN SATURATION: 99 % | HEART RATE: 53 BPM | WEIGHT: 215 LBS | HEIGHT: 65 IN | DIASTOLIC BLOOD PRESSURE: 70 MMHG | SYSTOLIC BLOOD PRESSURE: 134 MMHG | TEMPERATURE: 97.2 F

## 2022-08-10 DIAGNOSIS — I10 PRIMARY HYPERTENSION: Primary | ICD-10-CM

## 2022-08-10 DIAGNOSIS — I51.7 LEFT ATRIAL DILATATION: ICD-10-CM

## 2022-08-10 DIAGNOSIS — E66.01 CLASS 2 SEVERE OBESITY WITH SERIOUS COMORBIDITY AND BODY MASS INDEX (BMI) OF 35.0 TO 35.9 IN ADULT, UNSPECIFIED OBESITY TYPE (HCC): ICD-10-CM

## 2022-08-10 DIAGNOSIS — K76.0 FATTY LIVER: ICD-10-CM

## 2022-08-10 DIAGNOSIS — J30.2 SEASONAL ALLERGIC RHINITIS, UNSPECIFIED TRIGGER: ICD-10-CM

## 2022-08-10 DIAGNOSIS — Z13.6 SCREENING FOR CARDIOVASCULAR CONDITION: ICD-10-CM

## 2022-08-10 DIAGNOSIS — E55.9 VITAMIN D DEFICIENCY: ICD-10-CM

## 2022-08-10 DIAGNOSIS — Z13.1 SCREENING FOR DIABETES MELLITUS: ICD-10-CM

## 2022-08-10 DIAGNOSIS — E78.49 OTHER HYPERLIPIDEMIA: ICD-10-CM

## 2022-08-10 DIAGNOSIS — N32.81 OAB (OVERACTIVE BLADDER): ICD-10-CM

## 2022-08-10 DIAGNOSIS — I07.1 TRICUSPID VALVE INSUFFICIENCY, UNSPECIFIED ETIOLOGY: ICD-10-CM

## 2022-08-10 DIAGNOSIS — I34.0 MITRAL VALVE INSUFFICIENCY, UNSPECIFIED ETIOLOGY: ICD-10-CM

## 2022-08-10 DIAGNOSIS — R16.0 HEPATOMEGALY: ICD-10-CM

## 2022-08-10 PROCEDURE — 99214 OFFICE O/P EST MOD 30 MIN: CPT | Performed by: FAMILY MEDICINE

## 2022-08-10 RX ORDER — LOSARTAN POTASSIUM 100 MG/1
100 TABLET ORAL DAILY
Qty: 90 TABLET | Refills: 1 | Status: SHIPPED | OUTPATIENT
Start: 2022-08-10

## 2022-08-10 NOTE — PROGRESS NOTES
Assessment/Plan:  Problem List Items Addressed This Visit        Digestive    Hepatomegaly     Management per GI  F/U PRN as W/U negative  Recommend lifestyle modifications  Relevant Orders    Comprehensive metabolic panel    Fatty liver     Management per GI  F/U PRN as W/U negative  Recommend lifestyle modifications  Relevant Orders    Comprehensive metabolic panel       Respiratory    Seasonal allergic rhinitis     Stable on Claritin 10 mg daily p r n     Avoid decongestants  Cardiovascular and Mediastinum    Primary hypertension - Primary     Borderline BP  Continue Chlorthalidone 25mg QD and Losartan 100mg QD  Check blood pressure outside of office  Recommend lifestyle modifications  Relevant Medications    losartan (COZAAR) 100 MG tablet    Other Relevant Orders    CBC and differential    Comprehensive metabolic panel    Left atrial dilatation     Management per Cardio  Mitral regurgitation     Stable  Management per Cardio  Tricuspid regurgitation     Stable  Management per Cardio  Genitourinary    OAB (overactive bladder)     Stable without meds  Management per Gyne  Other    Class 2 severe obesity with serious comorbidity and body mass index (BMI) of 35 0 to 35 9 in [facility])     Improved  Recommend lifestyle modifications  Patient declines weight management  Relevant Orders    TSH, 3rd generation with Free T4 reflex    Other hyperlipidemia     Stable s statin  Recommend lifestyle modifications      The 10-year ASCVD risk score (Eladio Bello et al , 2013) is: 1 5%    Values used to calculate the score:      Age: 50 years      Sex: Female      Is Non- : No      Diabetic: No      Tobacco smoker: No      Systolic Blood Pressure: 364 mmHg      Is BP treated: Yes      HDL Cholesterol: 69 mg/dL      Total Cholesterol: 232 mg/dL           Relevant Orders    CBC and differential Comprehensive metabolic panel    Lipid panel    TSH, 3rd generation with Free T4 reflex    LDL cholesterol, direct    Vitamin D deficiency     Stable  Relevant Orders    Comprehensive metabolic panel    Vitamin D 25 hydroxy      Other Visit Diagnoses     Screening for diabetes mellitus        Relevant Orders    Hemoglobin A1C    Screening for cardiovascular condition        Relevant Orders    CBC and differential    Comprehensive metabolic panel    Lipid panel    LDL cholesterol, direct           Return in about 6 months (around 2/16/2023) for Physical / 6mo - HTN, HL, Vit D Def, Labs  Future Appointments   Date Time Provider Ramiro Reed   1/11/2023  4:20 PM He Dobson DO [facility]   2/16/2023  8:00 AM Faiza Naylor DO [facility]        Subjective:     Lashell Elizabeth is a 50 y o  female who presents today for a follow-up on her chronic medical conditions  HPI:  Chief Complaint   Patient presents with    Follow-up     3 month     -- Above per clinical staff and reviewed  --      HPI      Today:    Return in about 3 months (around 7/31/2022) for 4mo - HTN, HL, Vit D Def, Labs  4mo OV        Obesity - Watching diet   She not adding salt to food   Does intermittent fasting during the work week   +Exercise - Gym -  / Weights / [employer] [place] / Class for 1 hour, 6 times per week        HTN -  Management per Cardio Dr Cardona  Next appt 12/22  On increased Losartan 100mg QD and Chlorthalidone 25mg QD on 5/6/22  BP check outside of office on arm cuff 130's/70's      Hyperlipidemia - No statin previously        Vitamin D Deficiency - s/p Nafisa  Taking MVI, and OTC Vitamin D         RUQ Abdominal Pain / Fatty Liver - Management per GI Dr Norman Kinney  Next appt PRN  s/p RUQ U/S on 3/9/22 - stable gallbladder  Negative NAVA Fibrosure and abdominal ultrasound elastography with fat quantification (UGAP) - no hepatic fibrosis    Referred to Weight Management  She defers as she wants to work with the nutritionists at [employer], which is more affordable  Work-up negative for fibrosis        No Left Atrial Dilation (Measurement Error per Cardio) /Mitral Regurgitation / Tricuspid Regurgitation - Management per Cardio Dr Migel Trammell  Next appt 12/22  Pending repeat Echo when HTN is controlled        Has Cologard ordered 2/15/22 - will send out today            OAB - Management per Ms  AYSE Mcneal   Next appt 11/22   No meds        AR - Stable on Claritin PRN              Reviewed:  Labs 7/30/22, Gyn 11/17/21, GI 8/4/22, Cardio 6/29/22     Sees Gyn Ms  AYSE Mcneal at [facility] for [address] yearly   Next appt 11/22        No CRC previously            The following portions of the patient's history were reviewed and updated as appropriate: allergies, current medications, past family history, past medical history, past social history, past surgical history and problem list       Review of Systems   Constitutional: Negative for appetite change, chills, diaphoresis, fatigue and fever  Respiratory: Negative for chest tightness and shortness of breath  Cardiovascular: Negative for chest pain  Gastrointestinal: Negative for abdominal pain, blood in stool, diarrhea, nausea and vomiting  Genitourinary: Negative for dysuria          Current Outpatient Medications   Medication Sig Dispense Refill    chlorthalidone 25 mg tablet Take 1 tablet (25 mg total) by mouth daily (Patient taking differently: Take 25 mg by mouth daily Rx per Cardio) 30 tablet 5    GLUCOSAMINE-VITAMIN D PO Take 2 tablets by mouth in the morning Vitamin D 2000IU      losartan (COZAAR) 100 MG tablet Take 1 tablet (100 mg total) by mouth daily 90 tablet 1    Multiple Vitamins-Minerals (WOMENS MULTIVITAMIN PO) Take 1 tablet by mouth in the morning        loratadine (CLARITIN) 10 mg tablet Take 10 mg by mouth daily as needed   (Patient not taking: Reported on 8/10/2022)       No current facility-administered medications for this visit  Objective:  /70 (BP Location: Left arm, Patient Position: Sitting, Cuff Size: Large)   Pulse (!) 53   Temp (!) 97 2 °F (36 2 °C) (Temporal)   Ht 5' 5" (1 651 m)   Wt 97 5 kg (215 lb)   SpO2 99%   BMI 35 78 kg/m²    Wt Readings from Last 3 Encounters:   08/10/22 97 5 kg (215 lb)   08/04/22 97 5 kg (215 lb)   06/29/22 99 2 kg (218 lb 9 6 oz)      BP Readings from Last 3 Encounters:   08/10/22 134/70   08/04/22 152/82   06/29/22 130/70          Physical Exam  Vitals and nursing note reviewed  Constitutional:       Appearance: Normal appearance  She is well-developed  She is obese  HENT:      Head: Normocephalic and atraumatic  Eyes:      Conjunctiva/sclera: Conjunctivae normal    Neck:      Thyroid: No thyromegaly  Cardiovascular:      Rate and Rhythm: Normal rate and regular rhythm  Pulses: Normal pulses  Heart sounds: Normal heart sounds  Pulmonary:      Effort: Pulmonary effort is normal       Breath sounds: Normal breath sounds  Abdominal:      General: Bowel sounds are normal  There is no distension  Palpations: Abdomen is soft  There is no mass  Tenderness: There is no abdominal tenderness  There is no guarding or rebound  Musculoskeletal:         General: No swelling or tenderness  Cervical back: Neck supple  Right lower leg: No edema  Left lower leg: No edema  Neurological:      General: No focal deficit present  Mental Status: She is alert and oriented to person, place, and time     Psychiatric:         Mood and Affect: Mood normal          Lab Results:      Lab Results   Component Value Date    WBC 7 67 03/19/2022    HGB 12 1 03/19/2022    HCT 36 9 03/19/2022     03/19/2022    TRIG 38 07/30/2022    HDL 69 07/30/2022    LDLDIRECT 148 (H) 07/30/2022    ALT 22 07/30/2022    AST 17 07/30/2022    K 4 2 07/30/2022     (H) 07/30/2022 CREATININE 0 74 07/30/2022    BUN 23 07/30/2022    CO2 29 07/30/2022    GLUF 119 (H) 07/30/2022    HGBA1C 5 5 03/19/2022     No results found for: URICACID  Invalid input(s): BASENAME Vitamin D    No results found       POCT Labs

## 2022-08-12 NOTE — ASSESSMENT & PLAN NOTE
Stable s statin  Recommend lifestyle modifications      The 10-year ASCVD risk score (Jagruti Figueroa et al , 2013) is: 1 5%    Values used to calculate the score:      Age: 50 years      Sex: Female      Is Non- : No      Diabetic: No      Tobacco smoker: No      Systolic Blood Pressure: 270 mmHg      Is BP treated: Yes      HDL Cholesterol: 69 mg/dL      Total Cholesterol: 232 mg/dL

## 2022-08-12 NOTE — ASSESSMENT & PLAN NOTE
Borderline BP  Continue Chlorthalidone 25mg QD and Losartan 100mg QD  Check blood pressure outside of office  Recommend lifestyle modifications

## 2022-08-18 LAB — COLOGUARD RESULT REPORTABLE: NEGATIVE

## 2022-09-29 ENCOUNTER — VBI (OUTPATIENT)
Dept: ADMINISTRATIVE | Facility: OTHER | Age: 47
End: 2022-09-29

## 2022-10-28 DIAGNOSIS — I10 HTN (HYPERTENSION), BENIGN: ICD-10-CM

## 2022-10-28 RX ORDER — CHLORTHALIDONE 25 MG/1
25 TABLET ORAL DAILY
Qty: 30 TABLET | Refills: 5 | Status: SHIPPED | OUTPATIENT
Start: 2022-10-28

## 2022-11-25 ENCOUNTER — TELEPHONE (OUTPATIENT)
Dept: CARDIOLOGY CLINIC | Facility: CLINIC | Age: 47
End: 2022-11-25

## 2023-01-10 ENCOUNTER — OFFICE VISIT (OUTPATIENT)
Dept: CARDIOLOGY CLINIC | Facility: CLINIC | Age: 48
End: 2023-01-10

## 2023-01-10 VITALS
HEART RATE: 66 BPM | DIASTOLIC BLOOD PRESSURE: 80 MMHG | WEIGHT: 214.4 LBS | HEIGHT: 65 IN | SYSTOLIC BLOOD PRESSURE: 168 MMHG | BODY MASS INDEX: 35.72 KG/M2 | OXYGEN SATURATION: 100 %

## 2023-01-10 DIAGNOSIS — I10 HTN (HYPERTENSION), BENIGN: ICD-10-CM

## 2023-01-10 DIAGNOSIS — I10 PRIMARY HYPERTENSION: ICD-10-CM

## 2023-01-10 RX ORDER — CHLORTHALIDONE 25 MG/1
25 TABLET ORAL DAILY
Qty: 90 TABLET | Refills: 3
Start: 2023-01-10

## 2023-01-10 RX ORDER — LOSARTAN POTASSIUM 100 MG/1
100 TABLET ORAL DAILY
Qty: 90 TABLET | Refills: 3
Start: 2023-01-10

## 2023-01-10 NOTE — PROGRESS NOTES
Cardiology             Tony Allison  1975  [id number]              Assessment/Plan:     Hypertension   Reported abnormal echocardiogram  Mild dyslipidemia  Obesity, BMI 36        Home blood pressure readings predominantly 130s  Continue losartan 100 mg daily, chlorthalidone 25 mg daily  Advised low-sodium diet, exercise, weight loss, avoidance of nonsteroidal anti-inflammatory medications, minimizing alcohol, getting good sleep  Avoid AV node blockers in the setting of resting sinus bradycardia  Would likely start amlodipine if need additional blood pressure control in the future prior echocardiogram 3/2022 with normal left ventricular systolic function  Mild pulm hypertension noted in the setting of systemic hypertension 160/88  May consider repeating at some point  Although left atrial dilatation reported to be severe, it is not appear severe on visualization, possibly mildly dilated               Follow-up in 1 year             Interval History:      This is a very pleasant 78-year-old female with no prior cardiac history   Around November 2020 when she started having hypertension was initiated on losartan by her PCP  Samara Jones light of a faint murmur, she underwent an echocardiogram 03/17/2022, which reported ejection fraction 72 percent   There was reported mild right ventricular dilatation with severe left atrial dilatation   There is mild pulmonary hypertension with estimated pulmonary systolic pressure 42 millimeters Hg   Blood pressure at the time of echocardiogram was elevated at 160/88        After initial consultation 05/2022, chlorthalidone was added for better blood pressure control  She was encouraged to stop ibuprofen which she was using twice daily for knee pain and arthritis  Follow-up BMP 06/25/2022 demonstrated stable potassium at 3 9 and sodium at 140  BUN was slightly increased at 26 mg/dL  She presents today for follow-up with no complaints    She feels well without exertional chest pain, shortness of breath, dizziness, palpitations, lower extremity edema            Vitals:  Vitals:    01/10/23 1534   BP: 168/80   Pulse: 66   SpO2: 100%   Weight: 97 3 kg (214 lb 6 4 oz)   Height: 5' 5" (1 651 m)         Past Medical History:   Diagnosis Date   • Class 2 severe obesity with serious comorbidity and body mass index (BMI) of 36 0 to 36 9 in adult [facility])    • Fatty liver 03/2022   • Hepatomegaly 03/2022   • Left atrial dilatation 03/2022   • Mitral regurgitation 03/2022    Trace   • OAB (overactive bladder)    • Other hyperlipidemia 03/2022   • Primary hypertension    • Seasonal allergic rhinitis    • Tricuspid regurgitation 03/2022   • Vitamin D deficiency 03/2022     Social History     Socioeconomic History   • Marital status: /Civil Union     Spouse name: Not on file   • Number of children: 0   • Years of education: Not on file   • Highest education level: Not on file   Occupational History   • Occupation:  in [employer]     Employer: [employer]   Tobacco Use   • Smoking status: Never   • Smokeless tobacco: Never   Vaping Use   • Vaping Use: Never used   Substance and Sexual Activity   • Alcohol use: Not Currently   • Drug use: No   • Sexual activity: Yes     Partners: Male     Birth control/protection: Condom   Other Topics Concern   • Not on file   Social History Narrative        Lives with , mother-in-law    No Children     in [address] [facility] [address]     Social Determinants of Health     Financial Resource Strain: Not on file   Food Insecurity: Not on file   Transportation Needs: Not on file   Physical Activity: Not on file   Stress: Not on file   Social Connections: Not on file   Intimate Partner Violence: Not on file   Housing Stability: Not on file      Family History   Problem Relation Age of Onset   • Hypertension Mother    • Arrhythmia Mother    • Diabetes type II Mother    • Prostate cancer Father 79   • Hypertension Father    • Rheum arthritis Father 77   • Congenital Hearing Loss Father    • Other Sister         pseudomyxoma peritonei   • No Known Problems Sister    • No Known Problems Sister    • Parkinsonism Maternal Grandmother    • Valvular heart disease Maternal Grandfather    • No Known Problems Paternal Grandmother    • Heart attack Paternal Grandfather    • Coronary artery disease Paternal Grandfather    • No Known Problems Maternal Aunt    • No Known Problems Maternal Aunt    • No Known Problems Paternal Aunt    • Breast cancer Family          2 paternal great aunts late in life / both are decsd   • Alcohol abuse Neg Hx    • Substance Abuse Neg Hx    • Depression Neg Hx    • Mental illness Neg Hx      Past Surgical History:   Procedure Laterality Date   • HYSTEROSCOPY W/ POLYPECTOMY      times 2/ 2016/ 2nd one aug 18/18; Endometrial Polyp   • WISDOM TOOTH EXTRACTION         Current Outpatient Medications:   •  chlorthalidone 25 mg tablet, Take 1 tablet (25 mg total) by mouth daily, Disp: 90 tablet, Rfl: 3  •  GLUCOSAMINE-VITAMIN D PO, Take 2 tablets by mouth in the morning Vitamin D 2000IU, Disp: , Rfl:   •  loratadine (CLARITIN) 10 mg tablet, Take 10 mg by mouth daily as needed, Disp: , Rfl:   •  losartan (COZAAR) 100 MG tablet, Take 1 tablet (100 mg total) by mouth daily, Disp: 90 tablet, Rfl: 3  •  Multiple Vitamins-Minerals (WOMENS MULTIVITAMIN PO), Take 1 tablet by mouth in the morning  , Disp: , Rfl:         Review of Systems:  Review of Systems   Constitutional: Negative for activity change, fever and unexpected weight change  HENT: Negative for facial swelling, nosebleeds and voice change  Respiratory: Negative for chest tightness, shortness of breath and wheezing  Cardiovascular: Negative for chest pain, palpitations and leg swelling  Gastrointestinal: Negative for abdominal distention  Genitourinary: Negative for hematuria  Musculoskeletal: Negative for arthralgias     Skin: Negative for color change, pallor, rash and wound  Neurological: Negative for dizziness, seizures and syncope  Psychiatric/Behavioral: Negative for agitation  Physical Exam:  Physical Exam  Vitals reviewed  Constitutional:       Appearance: She is well-developed  HENT:      Head: Normocephalic and atraumatic  Cardiovascular:      Rate and Rhythm: Normal rate and regular rhythm  Heart sounds: Normal heart sounds  Pulmonary:      Effort: Pulmonary effort is normal       Breath sounds: Normal breath sounds  Abdominal:      Palpations: Abdomen is soft  Musculoskeletal:         General: Normal range of motion  Cervical back: Normal range of motion and neck supple  Skin:     General: Skin is warm and dry  Neurological:      Mental Status: She is alert and oriented to person, place, and time  Psychiatric:         Behavior: Behavior normal          Thought Content: Thought content normal          Judgment: Judgment normal          This note was completed in part utilizing [employer] Direct Software  Grammatical errors, random word insertions, spelling mistakes, and incomplete sentences can be an occasional consequence of this system secondary to software limitations, ambient noise, and hardware issues  If you have any questions or concerns about the content, text, or information contained within the body of this dictation, please contact the provider for clarification

## 2023-02-03 ENCOUNTER — RA CDI HCC (OUTPATIENT)
Dept: OTHER | Facility: HOSPITAL | Age: 48
End: 2023-02-03

## 2023-02-03 NOTE — PROGRESS NOTES
[facility]  coding opportunities       Chart reviewed, no opportunity found: CHART REVIEWED, NO OPPORTUNITY FOUND        Patients Insurance        Commercial Insurance: [address] [place] [address]

## 2023-02-05 PROBLEM — R73.01 IFG (IMPAIRED FASTING GLUCOSE): Status: ACTIVE | Noted: 2023-02-05

## 2023-02-05 LAB
25(OH)D3 SERPL-MCNC: 42 NG/ML (ref 30–100)
ALBUMIN SERPL-MCNC: 4.2 G/DL (ref 3.6–5.1)
ALBUMIN/GLOB SERPL: 1.8 (CALC) (ref 1–2.5)
ALP SERPL-CCNC: 73 U/L (ref 37–153)
ALT SERPL-CCNC: 11 U/L (ref 6–29)
AST SERPL-CCNC: 14 U/L (ref 10–35)
BASOPHILS # BLD AUTO: 43 CELLS/UL (ref 0–200)
BASOPHILS NFR BLD AUTO: 0.6 %
BILIRUB SERPL-MCNC: 0.5 MG/DL (ref 0.2–1.2)
BUN SERPL-MCNC: 27 MG/DL (ref 7–25)
BUN/CREAT SERPL: 44 (CALC) (ref 6–22)
CALCIUM SERPL-MCNC: 9.2 MG/DL (ref 8.6–10.4)
CHLORIDE SERPL-SCNC: 103 MMOL/L (ref 98–110)
CHOLEST SERPL-MCNC: 227 MG/DL
CHOLEST/HDLC SERPL: 3.2 (CALC)
CO2 SERPL-SCNC: 28 MMOL/L (ref 20–32)
CREAT SERPL-MCNC: 0.62 MG/DL (ref 0.5–1.03)
EOSINOPHIL # BLD AUTO: 216 CELLS/UL (ref 15–500)
EOSINOPHIL NFR BLD AUTO: 3 %
ERYTHROCYTE [DISTWIDTH] IN BLOOD BY AUTOMATED COUNT: 12.3 % (ref 11–15)
GFR/BSA.PRED SERPLBLD CYS-BASED-ARV: 108 ML/MIN/1.73M2
GLOBULIN SER CALC-MCNC: 2.4 G/DL (CALC) (ref 1.9–3.7)
GLUCOSE SERPL-MCNC: 109 MG/DL (ref 65–99)
HBA1C MFR BLD: 5.7 % OF TOTAL HGB
HCT VFR BLD AUTO: 33.9 % (ref 35–45)
HDLC SERPL-MCNC: 71 MG/DL
HGB BLD-MCNC: 11.9 G/DL (ref 11.7–15.5)
LDLC SERPL CALC-MCNC: 142 MG/DL (CALC)
LDLC SERPL DIRECT ASSAY-MCNC: 145 MG/DL
LYMPHOCYTES # BLD AUTO: 2196 CELLS/UL (ref 850–3900)
LYMPHOCYTES NFR BLD AUTO: 30.5 %
MCH RBC QN AUTO: 30.7 PG (ref 27–33)
MCHC RBC AUTO-ENTMCNC: 35.1 G/DL (ref 32–36)
MCV RBC AUTO: 87.4 FL (ref 80–100)
MONOCYTES # BLD AUTO: 569 CELLS/UL (ref 200–950)
MONOCYTES NFR BLD AUTO: 7.9 %
NEUTROPHILS # BLD AUTO: 4176 CELLS/UL (ref 1500–7800)
NEUTROPHILS NFR BLD AUTO: 58 %
NONHDLC SERPL-MCNC: 156 MG/DL (CALC)
PLATELET # BLD AUTO: 303 THOUSAND/UL (ref 140–400)
PMV BLD REES-ECKER: 10 FL (ref 7.5–12.5)
POTASSIUM SERPL-SCNC: 4.1 MMOL/L (ref 3.5–5.3)
PROT SERPL-MCNC: 6.6 G/DL (ref 6.1–8.1)
RBC # BLD AUTO: 3.88 MILLION/UL (ref 3.8–5.1)
SODIUM SERPL-SCNC: 138 MMOL/L (ref 135–146)
TRIGL SERPL-MCNC: 46 MG/DL
TSH SERPL-ACNC: 1.39 MIU/L
WBC # BLD AUTO: 7.2 THOUSAND/UL (ref 3.8–10.8)

## 2023-02-05 NOTE — RESULT ENCOUNTER NOTE
Unstable labs - will review with patient at upcoming appointment  IFG - New  To consider Metformin XR? Hyperlipidemia - Recommend lifestyle modifications      The 10-year ASCVD risk score (Clifton KAUR, et al , 2019) is: 2 5%    Values used to calculate the score:      Age: 46 years      Sex: Female      Is Non- : No      Diabetic: No      Tobacco smoker: No      Systolic Blood Pressure: 906 mmHg      Is BP treated: Yes      HDL Cholesterol: 71 mg/dL      Total Cholesterol: 227 mg/dL

## 2023-02-15 NOTE — PROGRESS NOTES
Assessment/Plan:  Problem List Items Addressed This Visit        Digestive    Hepatomegaly     Management per GI  F/U PRN as W/U negative  Recommend lifestyle modifications  Relevant Orders    Comprehensive metabolic panel    Fatty liver     Management per GI  F/U PRN as W/U negative  Recommend lifestyle modifications  Relevant Orders    Comprehensive metabolic panel       Endocrine    IFG (impaired fasting glucose)     New Dx  Patient declines Metformin XR and Pre-DM education  To consider GLP-1 agonist in future PRN? Recommend lifestyle modifications  Handout given  Relevant Orders    Comprehensive metabolic panel    Hemoglobin A1C       Respiratory    Seasonal allergic rhinitis     Stable on Claritin 10 mg daily p r n     Avoid decongestants  Cardiovascular and Mediastinum    Primary hypertension     Borderline BP  Continue Chlorthalidone 25mg QD and Losartan 100mg QD  Check blood pressure outside of office  Recommend lifestyle modifications  Relevant Medications    losartan (COZAAR) 100 MG tablet    chlorthalidone 25 mg tablet    Other Relevant Orders    Comprehensive metabolic panel    Magnesium    Left atrial dilatation     Management per Cardio  Mitral regurgitation     Stable  Management per Cardio  Tricuspid regurgitation     Stable  Management per Cardio  Genitourinary    OAB (overactive bladder)     Stable without meds  Management per Gyn  Other    Class 1 obesity with serious comorbidity and body mass index (BMI) of 34 0 to 34 9 in adult     Improved  Recommend lifestyle modifications  Patient declines weight management  To consider GLP-1 agonist in future PRN? Relevant Orders    TSH, 3rd generation with Free T4 reflex    Other hyperlipidemia     Stable s statin  Recommend lifestyle modifications      The 10-year ASCVD risk score (Clifton KAUR, et al , 2019) is: 1 5%    Values used to calculate the score:      Age: 46 years      Sex: Female      Is Non- : No      Diabetic: No      Tobacco smoker: No      Systolic Blood Pressure: 900 mmHg      Is BP treated: Yes      HDL Cholesterol: 71 mg/dL      Total Cholesterol: 227 mg/dL           Relevant Orders    Comprehensive metabolic panel    Lipid panel    TSH, 3rd generation with Free T4 reflex    LDL cholesterol, direct    Vitamin D deficiency     Stable  Relevant Orders    Comprehensive metabolic panel    Vitamin D 25 hydroxy   Other Visit Diagnoses     Annual physical exam    -  Primary    Obesity (BMI 30 0-34  9)        Relevant Orders    TSH, 3rd generation with Free T4 reflex    BMI 34 0-34 9,adult        Relevant Orders    TSH, 3rd generation with Free T4 reflex    HTN (hypertension), benign        Relevant Medications    losartan (COZAAR) 100 MG tablet    chlorthalidone 25 mg tablet           Return in about 4 months (around 6/16/2023) for 4mo - IFG, HTN, HL, Vit D Def, Labs  Future Appointments   Date Time Provider Ramiro Reed   3/1/2023  8:20 AM AYSE Carrion [facility]   6/20/2023  8:00 AM Agatha Finley DO [facility]   7/26/2023  8:20 AM He Dewitt DO [facility]        Subjective:     Ronel Torres is a 46 y o  female who presents today for a follow-up on her chronic medical conditions  HPI:  Chief Complaint   Patient presents with   • Physical Exam     Physical / 6mo - HTN, HL, Vit D Def, Labs    No new problems or concerns at this time  • HM     Declines flu shot and covid boosters  Unknown last Tdap, declines today  Mammo per gyn, sees gyn in two weeks  -- Above per clinical staff and reviewed  --      HPI      Today:      Return in about 6 months (around 2/16/2023) for Physical / 6mo - HTN, HL, Vit D Def, Labs  4mo OV        Obesity - Watching diet   She not adding salt to food   Does intermittent fasting during the work week   +Exercise - Gym -  / Weights / [employer] / Class for 1 hour, 6 times per week  IFG - No Metformin XR previously          HTN -  Management per Cardio Dr Swapnil Nava   Next appt 7/23  On increased Losartan 100mg QD and Chlorthalidone 25mg QD on 5/6/22   BP check outside of office on arm cuff 130's/60's      Hyperlipidemia - No statin previously        Vitamin D Deficiency - s/p Drisdol   Taking MVI, and OTC Vitamin D 2,000IU daily          RUQ Abdominal Pain / Fatty Liver - Management per GI Dr Marcelle Cantu   Next appt PRN   s/p RUQ U/S on 3/9/22 - stable gallbladder  Negative NAVA Fibrosure and abdominal ultrasound elastography with fat quantification (UGAP) - no hepatic fibrosis  Referred to Weight Management  She defers as she wants to work with the nutritionists at [employer], which is more affordable  Work-up negative for fibrosis        No Left Atrial Dilation (Measurement Error per Cardio) /Mitral Regurgitation / Tricuspid Regurgitation - Management per Cardio Dr Swapnil Nava   Next appt 7/24   Pending repeat Echo when HTN is controlled         OAB - Management per Ms Warner AYSE Saleh   Next appt 3/23   No meds        AR - Stable on Claritin PRN              Reviewed:  Labs 2/4/23, Gyn 11/17/21, GI 8/4/22, Cardio 1/10/23     Sees Gyn Ms  AYSE Nova at [facility] for [address] yearly   Next appt 3/23         Sees Dentist q6 months  Sees Optho q2 years        PHQ-2/9 Depression Screening    Little interest or pleasure in doing things: 0 - not at all  Feeling down, depressed, or hopeless: 0 - not at all  PHQ-2 Score: 0  PHQ-2 Interpretation: Negative depression screen           The following portions of the patient's history were reviewed and updated as appropriate: allergies, current medications, past family history, past medical history, past social history, past surgical history and problem list       Review of Systems   Constitutional: Negative for appetite change, chills, diaphoresis, fatigue and fever  Respiratory: Negative for chest tightness and shortness of breath  Cardiovascular: Negative for chest pain  Gastrointestinal: Negative for abdominal pain, blood in stool, diarrhea, nausea and vomiting  Genitourinary: Negative for dysuria  Current Outpatient Medications   Medication Sig Dispense Refill   • chlorthalidone 25 mg tablet Take 1 tablet (25 mg total) by mouth daily 90 tablet 2   • GLUCOSAMINE-VITAMIN D PO Take 2 tablets by mouth in the morning Vitamin D 2000IU for 2 tabs     • loratadine (CLARITIN) 10 mg tablet Take 10 mg by mouth daily as needed     • losartan (COZAAR) 100 MG tablet Take 1 tablet (100 mg total) by mouth daily 90 tablet 2   • Multiple Vitamins-Minerals (WOMENS MULTIVITAMIN PO) Take 1 tablet by mouth in the morning         No current facility-administered medications for this visit  Objective:  /66   Pulse 64   Temp 97 8 °F (36 6 °C)   Resp 16   Ht 5' 5" (1 651 m)   Wt 95 3 kg (210 lb 3 2 oz)   SpO2 100%   BMI 34 98 kg/m²    Wt Readings from Last 3 Encounters:   02/16/23 95 3 kg (210 lb 3 2 oz)   01/10/23 97 3 kg (214 lb 6 4 oz)   08/10/22 97 5 kg (215 lb)      BP Readings from Last 3 Encounters:   02/16/23 130/66   01/10/23 168/80   08/10/22 134/70          Physical Exam  Vitals and nursing note reviewed  Constitutional:       Appearance: Normal appearance  She is well-developed  She is obese  HENT:      Head: Normocephalic and atraumatic  Right Ear: Tympanic membrane, ear canal and external ear normal       Left Ear: Tympanic membrane, ear canal and external ear normal       Nose: Nose normal       Right Sinus: No maxillary sinus tenderness or frontal sinus tenderness  Left Sinus: No maxillary sinus tenderness or frontal sinus tenderness  Mouth/Throat:      Mouth: Mucous membranes are moist       Pharynx: Oropharynx is clear  Uvula midline  Tonsils: No tonsillar exudate  Eyes:      Extraocular Movements: Extraocular movements intact  Conjunctiva/sclera: Conjunctivae normal       Pupils: Pupils are equal, round, and reactive to light  Cardiovascular:      Rate and Rhythm: Normal rate and regular rhythm  Pulses: Normal pulses  Heart sounds: Normal heart sounds  Pulmonary:      Effort: Pulmonary effort is normal       Breath sounds: Normal breath sounds  Abdominal:      General: Bowel sounds are normal  There is no distension  Palpations: Abdomen is soft  There is no mass  Tenderness: There is no abdominal tenderness  There is no guarding or rebound  Musculoskeletal:         General: No swelling or tenderness  Cervical back: Neck supple  Right lower leg: No edema  Left lower leg: No edema  Lymphadenopathy:      Cervical: No cervical adenopathy  Skin:     Findings: No rash  Neurological:      General: No focal deficit present  Mental Status: She is alert and oriented to person, place, and time  Psychiatric:         Mood and Affect: Mood normal          Behavior: Behavior normal          Thought Content: Thought content normal          Judgment: Judgment normal          Lab Results:      Lab Results   Component Value Date    WBC 7 2 02/04/2023    HGB 11 9 02/04/2023    HCT 33 9 (L) 02/04/2023     02/04/2023    TRIG 46 02/04/2023    HDL 71 02/04/2023    LDLDIRECT 145 (H) 02/04/2023    ALT 11 02/04/2023    AST 14 02/04/2023    K 4 1 02/04/2023     02/04/2023    CREATININE 0 62 02/04/2023    BUN 27 (H) 02/04/2023    CO2 28 02/04/2023    GLUF 119 (H) 07/30/2022    HGBA1C 5 7 (H) 02/04/2023     No results found for: URICACID  Invalid input(s): BASENAME Vitamin D    No results found  POCT Labs      BMI Counseling: Body mass index is 34 98 kg/m²  The BMI is above normal  Nutrition recommendations include encouraging healthy choices of fruits and vegetables   Exercise recommendations include exercising 3-5 times per week  No pharmacotherapy was ordered  Rationale for BMI follow-up plan is due to patient being overweight or obese  Depression Screening and Follow-up Plan: Patient was screened for depression during today's encounter  They screened negative with a PHQ-2 score of 0

## 2023-02-16 ENCOUNTER — OFFICE VISIT (OUTPATIENT)
Dept: FAMILY MEDICINE CLINIC | Facility: CLINIC | Age: 48
End: 2023-02-16

## 2023-02-16 VITALS
HEART RATE: 64 BPM | TEMPERATURE: 97.8 F | OXYGEN SATURATION: 100 % | WEIGHT: 210.2 LBS | SYSTOLIC BLOOD PRESSURE: 130 MMHG | BODY MASS INDEX: 35.02 KG/M2 | DIASTOLIC BLOOD PRESSURE: 66 MMHG | RESPIRATION RATE: 16 BRPM | HEIGHT: 65 IN

## 2023-02-16 DIAGNOSIS — R16.0 HEPATOMEGALY: ICD-10-CM

## 2023-02-16 DIAGNOSIS — I34.0 MITRAL VALVE INSUFFICIENCY, UNSPECIFIED ETIOLOGY: ICD-10-CM

## 2023-02-16 DIAGNOSIS — I51.7 LEFT ATRIAL DILATATION: ICD-10-CM

## 2023-02-16 DIAGNOSIS — E66.9 OBESITY (BMI 30.0-34.9): ICD-10-CM

## 2023-02-16 DIAGNOSIS — J30.2 SEASONAL ALLERGIC RHINITIS, UNSPECIFIED TRIGGER: ICD-10-CM

## 2023-02-16 DIAGNOSIS — E66.9 CLASS 1 OBESITY WITH SERIOUS COMORBIDITY AND BODY MASS INDEX (BMI) OF 34.0 TO 34.9 IN ADULT, UNSPECIFIED OBESITY TYPE: ICD-10-CM

## 2023-02-16 DIAGNOSIS — E55.9 VITAMIN D DEFICIENCY: ICD-10-CM

## 2023-02-16 DIAGNOSIS — I10 PRIMARY HYPERTENSION: ICD-10-CM

## 2023-02-16 DIAGNOSIS — I07.1 TRICUSPID VALVE INSUFFICIENCY, UNSPECIFIED ETIOLOGY: ICD-10-CM

## 2023-02-16 DIAGNOSIS — E78.49 OTHER HYPERLIPIDEMIA: ICD-10-CM

## 2023-02-16 DIAGNOSIS — I10 HTN (HYPERTENSION), BENIGN: ICD-10-CM

## 2023-02-16 DIAGNOSIS — K76.0 FATTY LIVER: ICD-10-CM

## 2023-02-16 DIAGNOSIS — N32.81 OAB (OVERACTIVE BLADDER): ICD-10-CM

## 2023-02-16 DIAGNOSIS — R73.01 IFG (IMPAIRED FASTING GLUCOSE): ICD-10-CM

## 2023-02-16 DIAGNOSIS — Z00.00 ANNUAL PHYSICAL EXAM: Primary | ICD-10-CM

## 2023-02-16 PROBLEM — E66.811 CLASS 1 OBESITY WITH SERIOUS COMORBIDITY AND BODY MASS INDEX (BMI) OF 34.0 TO 34.9 IN ADULT: Status: ACTIVE | Noted: 2022-02-15

## 2023-02-16 RX ORDER — LOSARTAN POTASSIUM 100 MG/1
100 TABLET ORAL DAILY
Qty: 90 TABLET | Refills: 2 | Status: SHIPPED | OUTPATIENT
Start: 2023-02-16

## 2023-02-16 RX ORDER — CHLORTHALIDONE 25 MG/1
25 TABLET ORAL DAILY
Qty: 90 TABLET | Refills: 2 | Status: SHIPPED | OUTPATIENT
Start: 2023-02-16

## 2023-02-16 NOTE — ASSESSMENT & PLAN NOTE
Stable s statin  Recommend lifestyle modifications      The 10-year ASCVD risk score (Clifton KAUR, et al , 2019) is: 1 5%    Values used to calculate the score:      Age: 46 years      Sex: Female      Is Non- : No      Diabetic: No      Tobacco smoker: No      Systolic Blood Pressure: 662 mmHg      Is BP treated: Yes      HDL Cholesterol: 71 mg/dL      Total Cholesterol: 227 mg/dL

## 2023-02-16 NOTE — PATIENT INSTRUCTIONS
Prediabetes   AMBULATORY CARE:   Prediabetes  is a blood glucose (sugar) level that is higher than normal  It is not high enough to be considered diabetes  Prediabetes increases your risk for type 2 diabetes and heart disease, especially if you have high blood pressure or high cholesterol  The following increase your risk for prediabetes:   Being overweight or obese, with a body mass index (BMI) of 25 or higher (23 or higher if you are  American)    Lack of physical activity    Older age    Family history of diabetes (parent or sibling)    A history of heart disease, gestational diabetes, or polycystic ovary syndrome (PCOS)    High blood pressure or cholesterol levels    Being [place] American, , , [place] American, or     In children, having a mother with diabetes or gestational diabetes mellitus (GDM) during the pregnancy    Signs and symptoms:  Prediabetes may not cause any symptoms  Call your doctor if:   You have more hunger or thirst than usual     You are urinating more often than usual     You are always exhausted  You have blurred vision  You have questions or concerns about your condition or care  Prevent or delay type 2 diabetes:  Healthy choices work best to delay or prevent type 2 diabetes  You may be given the following guidelines from your healthcare provider:  Get regular physical activity  Adults should get at least 150 minutes (2 5 hours) of moderate physical activity every week  Spread the amount of activity over at least 3 days a week  Do not skip more than 2 days in a row  Children should get at least 60 minutes of moderate physical activity on most days of the week  Examples of moderate physical activity include brisk walking, running, and swimming  Do not sit for longer than 30 minutes at a time  Work with your healthcare provider to create a plan for physical activity  Lose weight if you are overweight    A weight loss of 7% of your body weight can help  Your healthcare provider can tell you what weight is healthy for you  He or she can help you create a weight loss plan  Eat healthy foods  Eat a variety of fruits and vegetables  Eat whole-grain foods more often  Choose dairy foods, meat, and other protein foods that are low in fat  Eat fewer sweets, such as candy, cookies, regular soda, and sweetened drinks  You can also decrease calories by eating smaller portion sizes  Work with your healthcare provider or dietitian to develop a meal plan that is right for you  Take medicine as directed  Your healthcare provider may give diabetes medicine if you are at high risk for diabetes  You may also need medicines for high blood pressure and high cholesterol  Follow up with your healthcare provider as directed  You will need to return every year to get tested for diabetes  Do not smoke  Do not use e-cigarettes or smokeless tobacco in place of cigarettes or to help you quit  They still contain nicotine  Ask your healthcare provider for information if you currently smoke and need help quitting  Start with small goals and work your way up  You may need to start with 3 days of physical activity  You can add a day after 3 weeks or so of activity  Make a goal of losing 5 pounds at first  Swap a piece of fruit for dessert or sweets  Start with 2 fruits in a week and increase it weekly  These are suggestions  Talk with healthcare providers about making a plan that is right for you  Follow up with your doctor as directed: If you do have prediabetes, you will need to return every year to get tested for diabetes  Write down your questions so you remember to ask them during your visits  © Copyright [address] 2022 Information is for End User's use only and may not be sold, redistributed or otherwise used for commercial purposes   All illustrations and images included in CareNotes® are the copyrighted property of [employer]  or [address]   The above information is an  only  It is not intended as medical advice for individual conditions or treatments  Talk to your doctor, nurse or pharmacist before following any medical regimen to see if it is safe and effective for you  Wellness Visit for Adults   AMBULATORY CARE:   A wellness visit  is when you see your healthcare provider to get screened for health problems  Your healthcare provider will also give you advice on how to stay healthy  Write down your questions so you remember to ask them  Ask your healthcare provider how often you should have a wellness visit  What happens at a wellness visit:  Your healthcare provider will ask about your health, and your family history of health problems  This includes high blood pressure, heart disease, and cancer  He or she will ask if you have symptoms that concern you, if you smoke, and about your mood  You may also be asked about your intake of medicines, supplements, food, and alcohol  Any of the following may be done: Your weight  will be checked  Your height may also be checked so your body mass index (BMI) can be calculated  Your BMI shows if you are at a healthy weight  Your blood pressure  and heart rate will be checked  Your temperature may also be checked  Blood and urine tests  may be done  Blood tests may be done to check your cholesterol levels  Abnormal cholesterol levels increase your risk for heart disease and stroke  You may also need a blood or urine test to check for diabetes if you are at increased risk  Urine tests may be done to look for signs of an infection or kidney disease  A physical exam  includes checking your heartbeat and lungs with a stethoscope  Your healthcare provider may also check your skin to look for sun damage  Screening tests  may be recommended  A screening test is done to check for diseases that may not cause symptoms   The screening tests you may need depend on your age, gender, family history, and lifestyle habits  For example, colorectal screening may be recommended if you are 48years old or older  Screening tests you need if you are a woman:   A Pap smear  is used to screen for cervical cancer  Pap smears are usually done every 3 to 5 years depending on your age  You may need them more often if you have had abnormal Pap smear test results in the past  Ask your healthcare provider how often you should have a Pap smear  A mammogram  is an x-ray of your breasts to screen for breast cancer  Experts recommend mammograms every 2 years starting at age 48 years  You may need a mammogram at age 52 years or younger if you have an increased risk for breast cancer  Talk to your healthcare provider about when you should start having mammograms and how often you need them  Vaccines you may need:   Get an influenza vaccine  every year  The influenza vaccine protects you from the flu  Several types of viruses cause the flu  The viruses change over time, so new vaccines are made each year  Get a tetanus-diphtheria (Td) booster vaccine  every 10 years  This vaccine protects you against tetanus and diphtheria  Tetanus is a severe infection that may cause painful muscle spasms and lockjaw  Diphtheria is a severe bacterial infection that causes a thick covering in the back of your mouth and throat  Get a human papillomavirus (HPV) vaccine  if you are female and aged 23 to 32 or male 23 to 24 and never received it  This vaccine protects you from HPV infection  HPV is the most common infection spread by sexual contact  HPV may also cause vaginal, penile, and anal cancers  Get a pneumococcal vaccine  if you are aged 72 years or older  The pneumococcal vaccine is an injection given to protect you from pneumococcal disease  Pneumococcal disease is an infection caused by pneumococcal bacteria  The infection may cause pneumonia, meningitis, or an ear infection      Get a shingles vaccine  if you are 61 or older, even if you have had shingles before  The shingles vaccine is an injection to protect you from the varicella-zoster virus  This is the same virus that causes chickenpox  Shingles is a painful rash that develops in people who had chickenpox or have been exposed to the virus  How to eat healthy:  My Plate is a model for planning healthy meals  It shows the types and amounts of foods that should go on your plate  Fruits and vegetables make up about half of your plate, and grains and protein make up the other half  A serving of dairy is included on the side of your plate  The amount of calories and serving sizes you need depends on your age, gender, weight, and height  Examples of healthy foods are listed below:  Eat a variety of vegetables  such as dark green, red, and orange vegetables  You can also include canned vegetables low in sodium (salt) and frozen vegetables without added butter or sauces  Eat a variety of fresh fruits , canned fruit in 100% juice, frozen fruit, and dried fruit  Include whole grains  At least half of the grains you eat should be whole grains  Examples include whole-wheat bread, wheat pasta, brown rice, and whole-grain cereals such as oatmeal     Eat a variety of protein foods such as seafood (fish and shellfish), lean meat, and poultry without skin (turkey and chicken)  Examples of lean meats include pork leg, shoulder, or tenderloin, and beef round, sirloin, tenderloin, and extra lean ground beef  Other protein foods include eggs and egg substitutes, beans, peas, soy products, nuts, and seeds  Choose low-fat dairy products such as skim or 1% milk or low-fat yogurt, cheese, and cottage cheese  Limit unhealthy fats  such as butter, hard margarine, and shortening  Exercise:  Exercise at least 30 minutes per day on most days of the week  Some examples of exercise include walking, biking, dancing, and swimming   You can also fit in more physical activity by taking the stairs instead of the elevator or parking farther away from stores  Include muscle strengthening activities 2 days each week  Regular exercise provides many health benefits  It helps you manage your weight, and decreases your risk for type 2 diabetes, heart disease, stroke, and high blood pressure  Exercise can also help improve your mood  Ask your healthcare provider about the best exercise plan for you  General health and safety guidelines:   Do not smoke  Nicotine and other chemicals in cigarettes and cigars can cause lung damage  Ask your healthcare provider for information if you currently smoke and need help to quit  E-cigarettes or smokeless tobacco still contain nicotine  Talk to your healthcare provider before you use these products  Limit alcohol  A drink of alcohol is 12 ounces of beer, 5 ounces of wine, or 1½ ounces of liquor  Lose weight, if needed  Being overweight increases your risk of certain health conditions  These include heart disease, high blood pressure, type 2 diabetes, and certain types of cancer  Protect your skin  Do not sunbathe or use tanning beds  Use sunscreen with a SPF 15 or higher  Apply sunscreen at least 15 minutes before you go outside  Reapply sunscreen every 2 hours  Wear protective clothing, hats, and sunglasses when you are outside  Drive safely  Always wear your seatbelt  Make sure everyone in your car wears a seatbelt  A seatbelt can save your life if you are in an accident  Do not use your cell phone when you are driving  This could distract you and cause an accident  Pull over if you need to make a call or send a text message  Practice safe sex  Use latex condoms if are sexually active and have more than one partner  Your healthcare provider may recommend screening tests for sexually transmitted infections (STIs)  Wear helmets, lifejackets, and protective gear    Always wear a helmet when you ride a bike or motorcycle, go skiing, or play sports that could cause a head injury  Wear protective equipment when you play sports  Wear a lifejacket when you are on a boat or doing water sports  © Copyright [employer] 2022 Information is for End User's use only and may not be sold, redistributed or otherwise used for commercial purposes  All illustrations and images included in CareNotes® are the copyrighted property of [employer]  or Caroline Webster   The above information is an  only  It is not intended as medical advice for individual conditions or treatments  Talk to your doctor, nurse or pharmacist before following any medical regimen to see if it is safe and effective for you  Obesity   AMBULATORY CARE:   Obesity  means your body mass index (BMI) is greater than 30  Your healthcare provider will use your height and weight to measure your BMI  The risks of obesity include  many health problems, including injuries or physical disability  Diabetes (high blood sugar level)    High blood pressure or high cholesterol    Heart disease    Stroke    Gallbladder or liver disease    Cancer of the colon, breast, prostate, liver, or kidney    Sleep apnea    Arthritis or gout    Screening  is done to check for health conditions before you have signs or symptoms  If you are 28to 79years old, your blood sugar level may be checked every 3 years for signs of prediabetes or diabetes  Your healthcare provider will check your blood pressure at each visit  High blood pressure can lead to a stroke or other problems  Your provider may check for signs of heart disease, cancer, or other health problems  Seek care immediately if:   You have a severe headache, confusion, or difficulty speaking  You have weakness on one side of your body  You have chest pain, sweating, or shortness of breath  Call your doctor if:   You have symptoms of gallbladder or liver disease, such as pain in your upper abdomen      You have knee or hip pain and discomfort while walking  You have symptoms of diabetes, such as intense hunger and thirst, and frequent urination  You have symptoms of sleep apnea, such as snoring or daytime sleepiness  You have questions or concerns about your condition or care  Treatment for obesity  focuses on helping you lose weight to improve your health  Even a small decrease in BMI can reduce the risk for many health problems  Your healthcare provider will help you set a weight-loss goal   Lifestyle changes  are the first step in treating obesity  These include making healthy food choices and getting regular physical activity  Your healthcare provider may suggest a weight-loss program that involves coaching, education, and therapy  Medicine  may help you lose weight when it is used with a healthy foods and physical activity  Surgery  can help you lose weight if you are very obese and have other health problems  There are several types of weight-loss surgery  Ask your healthcare provider for more information  Tips for safe weight loss:   Set small, realistic goals  An example of a small goal is to walk for 20 minutes 5 days a week  Anther goal is to lose 5% of your body weight  Tell friends, family members, and coworkers about your goals  and ask for their support  Ask a friend to lose weight with you, or join a weight-loss support group  Identify foods or triggers that may cause you to overeat , and find ways to avoid them  Remove tempting high-calorie foods from your home and workplace  Place a bowl of fresh fruit on your kitchen counter  If stress causes you to eat, then find other ways to cope with stress  A counselor or therapist may be able to help you  Keep a diary to track what you eat and drink  Also write down how many minutes of physical activity you do each day  Weigh yourself once a week and record it in your diary  Eating changes:   You will need to eat 500 to 1,000 fewer calories each day than you currently eat to lose 1 to 2 pounds a week  The following changes will help you cut calories:  Eat smaller portions  Use small plates, no larger than 9 inches in diameter  Fill your plate half full of fruits and vegetables  Measure your food using measuring cups until you know what a serving size looks like  Eat 3 meals and 1 or 2 snacks each day  Plan your meals in advance  [address] [place] and eat at home most of the time  Eat slowly  Do not skip meals  Skipping meals can lead to overeating later in the day  This can make it harder for you to lose weight  Talk with a dietitian to help you make a meal plan and schedule that is right for you  Eat fruits and vegetables at every meal   They are low in calories and high in fiber, which makes you feel full  Do not add butter, margarine, or cream sauce to vegetables  Use herbs to season steamed vegetables  Eat less fat and fewer fried foods  Eat more baked or grilled chicken and fish  These protein sources are lower in calories and fat than red meat  Limit fast food  Dress your salads with olive oil and vinegar instead of bottled dressing  Limit the amount of sugar you eat  Do not drink sugary beverages  Limit alcohol  Activity changes:  Physical activity is good for your body in many ways  It helps you burn calories and build strong muscles  It decreases stress and depression, and improves your mood  It can also help you sleep better  Talk to your healthcare provider before you begin an exercise program   Exercise for at least 30 minutes 5 days a week  Start slowly  Set aside time each day for physical activity that you enjoy and that is convenient for you  It is best to do both weight training and an activity that increases your heart rate, such as walking, bicycling, or swimming  Find ways to be more active  Do yard work and housecleaning  Walk up the stairs instead of using elevators   Spend your leisure time going to events that require walking, such as outdoor festivals or fairs  This extra physical activity can help you lose weight and keep it off  Follow up with your doctor as directed: You may need to meet with a dietitian  Write down your questions so you remember to ask them during your visits  © Copyright [employer] 2022 Information is for End User's use only and may not be sold, redistributed or otherwise used for commercial purposes  All illustrations and images included in CareNotes® are the copyrighted property of [employer]  or Caroline Webster   The above information is an  only  It is not intended as medical advice for individual conditions or treatments  Talk to your doctor, nurse or pharmacist before following any medical regimen to see if it is safe and effective for you  Cholesterol and Your Health   AMBULATORY CARE:   Cholesterol  is a waxy, fat-like substance  Your body uses cholesterol to make hormones and new cells, and to protect nerves  Cholesterol is made by your body  It also comes from certain foods you eat, such as meat and dairy products  Your healthcare provider can help you set goals for your cholesterol levels  He or she can help you create a plan to meet your goals  Cholesterol level goals: Your cholesterol level goals depend on your risk for heart disease, your age, and your other health conditions  The following are general guidelines: Total cholesterol  includes low-density lipoprotein (LDL), high-density lipoprotein (HDL), and triglyceride levels  The total cholesterol level should be lower than 200 mg/dL and is best at about 150 mg/dL  LDL cholesterol  is called bad cholesterol  because it forms plaque in your arteries  As plaque builds up, your arteries become narrow, and less blood flows through  When plaque decreases blood flow to your heart, you may have chest pain   If plaque completely blocks an artery that brings blood to your heart, you may have a heart attack  Plaque can break off and form blood clots  Blood clots may block arteries in your brain and cause a stroke  The level should be less than 130 mg/dL and is best at about 100 mg/dL  HDL cholesterol  is called good cholesterol  because it helps remove LDL cholesterol from your arteries  It does this by attaching to LDL cholesterol and carrying it to your liver  Your liver breaks down LDL cholesterol so your body can get rid of it  High levels of HDL cholesterol can help prevent a heart attack and stroke  Low levels of HDL cholesterol can increase your risk for heart disease, heart attack, and stroke  The level should be 60 mg/dL or higher  Triglycerides  are a type of fat that store energy from foods you eat  High levels of triglycerides also cause plaque buildup  This can increase your risk for a heart attack or stroke  If your triglyceride level is high, your LDL cholesterol level may also be high  The level should be less than 150 mg/dL  Any of the following can increase your risk for high cholesterol:   Smoking cigarettes    Being overweight or obese, or not getting enough exercise    Drinking large amounts of alcohol    A medical condition such as hypertension (high blood pressure) or diabetes    Certain genes passed from your parents to you    Age older than 65 years    What you need to know about having your cholesterol levels checked: Adults 21to 39years of age should have their cholesterol levels checked every 4 to 6 years  Adults 45 years or older should have their cholesterol checked every 1 to 2 years  You may need your cholesterol checked more often, or at a younger age, if you have risk factors for heart disease  You may also need to have your cholesterol checked more often if you have other health conditions, such as diabetes  Blood tests are used to check cholesterol levels   Blood tests measure your levels of triglycerides, LDL cholesterol, and HDL cholesterol  How healthy fats affect your cholesterol levels:  Healthy fats, also called unsaturated fats, help lower LDL cholesterol and triglyceride levels  Healthy fats include the following:  Monounsaturated fats  are found in foods such as olive oil, canola oil, avocado, nuts, and olives  Polyunsaturated fats,  such as omega 3 fats, are found in fish, such as salmon, trout, and tuna  They can also be found in plant foods such as flaxseed, walnuts, and soybeans  How unhealthy fats affect your cholesterol levels:  Unhealthy fats increase LDL cholesterol and triglyceride levels  They are found in foods high in cholesterol, saturated fat, and trans fat:  Cholesterol  is found in eggs, dairy, and meat  Saturated fat  is found in butter, cheese, ice cream, whole milk, and coconut oil  Saturated fat is also found in meat, such as sausage, hot dogs, and bologna  Trans fat  is found in liquid oils and is used in fried and baked foods  Foods that contain trans fats include chips, crackers, muffins, sweet rolls, microwave popcorn, and cookies  Treatment  for high cholesterol will also decrease your risk of heart disease, heart attack, and stroke  Treatment may include any of the following:  Lifestyle changes  may include food, exercise, weight loss, and quitting smoking  You may also need to decrease the amount of alcohol you drink  Your healthcare provider will want you to start with lifestyle changes  Other treatment may be added if lifestyle changes are not enough  Your healthcare provider may recommend you work with a team to manage hyperlipidemia  The team may include medical experts such as a dietitian, an exercise or physical therapist, and a behavior therapist  Your family members may be included in helping you create lifestyle changes  Medicines  may be given to lower your LDL cholesterol, triglyceride levels, or total cholesterol level   You may need medicines to lower your cholesterol if any of the following is true:    You have a history of stroke, TIA, unstable angina, or a heart attack  Your LDL cholesterol level is 190 mg/dL or higher  You are age 36 to 76 years, have diabetes or heart disease risk factors, and your LDL cholesterol is 70 mg/dL or higher  Supplements  include fish oil, red yeast rice, and garlic  Fish oil may help lower your triglyceride and LDL cholesterol levels  It may also increase your HDL cholesterol level  Red yeast rice may help decrease your total cholesterol level and LDL cholesterol level  Garlic may help lower your total cholesterol level  Do not take any supplements without talking to your healthcare provider  Food changes you can make to lower your cholesterol levels:  A dietitian can help you create a healthy eating plan  He or she can show you how to read food labels and choose foods low in saturated fat, trans fats, and cholesterol  Decrease the total amount of fat you eat  Choose lean meats, fat-free or 1% fat milk, and low-fat dairy products, such as yogurt and cheese  Try to limit or avoid red meats  Limit or do not eat fried foods or baked goods, such as cookies  Replace unhealthy fats with healthy fats  Cook foods in olive oil or canola oil  Choose soft margarines that are low in saturated fat and trans fat  Seeds, nuts, and avocados are other examples of healthy fats  Eat foods with omega-3 fats  Examples include salmon, tuna, mackerel, walnuts, and flaxseed  Eat fish 2 times per week  Pregnant women should not eat fish that have high levels of mercury, such as shark, swordfish, and maria fernanda mackerel  Increase the amount of high-fiber foods you eat  High-fiber foods can help lower your LDL cholesterol  Aim to get between 20 and 30 grams of fiber each day  Fruits and vegetables are high in fiber  Eat at least 5 servings each day  Other high-fiber foods are whole-grain or whole-wheat breads, pastas, or cereals, and brown rice   Eat 3 ounces of whole-grain foods each day  Increase fiber slowly  You may have abdominal discomfort, bloating, and gas if you add fiber to your diet too quickly  Eat healthy protein foods  Examples include low-fat dairy products, skinless chicken and turkey, fish, and nuts  Limit foods and drinks that are high in sugar  Your dietitian or healthcare provider can help you create daily limits for high-sugar foods and drinks  The limit may be lower if you have diabetes or another health condition  Limits can also help you lose weight if needed  Lifestyle changes you can make to lower your cholesterol levels:   Maintain a healthy weight  Ask your healthcare provider what a healthy weight is for you  Ask him or her to help you create a weight loss plan if needed  Weight loss can decrease your total cholesterol and triglyceride levels  Weight loss may also help keep your blood pressure at a healthy level  Be physically active throughout the day  Physical activity, such as exercise, can help lower your total cholesterol level and maintain a healthy weight  Physical activity can also help increase your HDL cholesterol level  Work with your healthcare provider to create an program that is right for you  Get at least 30 to 40 minutes of moderate physical activity most days of the week  Examples of exercise include brisk walking, swimming, or biking  Also include strength training at least 2 times each week  Your healthcare providers can help you create a physical activity plan  Do not smoke  Nicotine and other chemicals in cigarettes and cigars can raise your cholesterol levels  Ask your healthcare provider for information if you currently smoke and need help to quit  E-cigarettes or smokeless tobacco still contain nicotine  Talk to your healthcare provider before you use these products  Limit or do not drink alcohol  Alcohol can increase your triglyceride levels   Ask your healthcare provider before you drink alcohol  Ask how much is okay for you to drink in 24 hours or 1 week  Follow up with your doctor as directed:  Write down your questions so you remember to ask them during your visits  © Copyright [employer] 2022 Information is for End User's use only and may not be sold, redistributed or otherwise used for commercial purposes  All illustrations and images included in CareNotes® are the copyrighted property of A D A M , Inc  or [facility][note 3] Octaviano Webster   The above information is an  only  It is not intended as medical advice for individual conditions or treatments  Talk to your doctor, nurse or pharmacist before following any medical regimen to see if it is safe and effective for you

## 2023-02-16 NOTE — ASSESSMENT & PLAN NOTE
New Dx  Patient declines Metformin XR and Pre-DM education  To consider GLP-1 agonist in future PRN? Recommend lifestyle modifications  Handout given

## 2023-02-16 NOTE — ASSESSMENT & PLAN NOTE
Improved  Recommend lifestyle modifications  Patient declines weight management  To consider GLP-1 agonist in future PRN?

## 2023-02-22 NOTE — PROGRESS NOTES
Assessment/Plan:    Recommended monthly SBE, annual CBE and annual screening mammo  ASCCP guidelines reviewed and pap with cotesting noted to be up to date; this low risk patient was advised she meets criteria to d/c pap screening at age 70  No pap done today  Colonoscopy noted to be up to date  The patient denies STI risk factors and declines testing at this time  Reviewed diet/activity recommendations Calcium 1200 mg and Vit D 600-1000 IU daily  Discussed postmenopausal considerations and symptoms to report  Kegel exercises as instructed  RTO in one year for routine annual gyn exam or sooner PRN  Diagnoses and all orders for this visit:    Encounter for gynecological examination (general) (routine) without abnormal findings    Screening mammogram for breast cancer  -     Mammo screening bilateral w 3d & cad; Future        Subjective:      Patient ID: Liza Ahumada is a 46 y o  female  This patient presents for routine annual gyn exam    PM with last menses 9/2021  Resoved VM sx   Has occasional vaginal dryness, using lubrication with intercourse  She denies pelvic pain, dyspareunia, breast concerns, bowel/bladder concerns, abnormal discharge, depression/anxiety  Pap/HPV testing up to date and normal, 11/17/21  Mammo 11/26/21   and monogamous  She denies STI concerns  The following portions of the patient's history were reviewed and updated as appropriate: allergies, current medications, past family history, past medical history, past social history, past surgical history and problem list     Review of Systems   Constitutional: Negative  Respiratory: Negative  Cardiovascular: Negative  Gastrointestinal: Negative  Endocrine: Negative  Genitourinary: Negative for dyspareunia, dysuria, frequency, pelvic pain, urgency, vaginal bleeding, vaginal discharge and vaginal pain  Musculoskeletal: Negative  Skin: Negative  Neurological: Negative  Psychiatric/Behavioral: Negative  Objective:      /80   Pulse 60   Ht 5' 5" (1 651 m)   Wt 54 9 kg (121 lb)   BMI 20 14 kg/m²          Physical Exam  Vitals and nursing note reviewed  Exam conducted with a chaperone present  Constitutional:       Appearance: Normal appearance  She is well-developed  HENT:      Head: Normocephalic and atraumatic  Neck:      Thyroid: No thyroid mass or thyromegaly  Cardiovascular:      Rate and Rhythm: Normal rate and regular rhythm  Heart sounds: Normal heart sounds  Pulmonary:      Effort: Pulmonary effort is normal       Breath sounds: Normal breath sounds  Chest:   Breasts:     Breasts are symmetrical       Right: No inverted nipple, mass, nipple discharge, skin change or tenderness  Left: No inverted nipple, mass, nipple discharge, skin change or tenderness  Abdominal:      General: Bowel sounds are normal       Palpations: Abdomen is soft  Tenderness: There is no abdominal tenderness  Hernia: There is no hernia in the left inguinal area or right inguinal area  Genitourinary:     General: Normal vulva  Exam position: Supine  Pubic Area: No rash  Labia:         Right: No rash, tenderness, lesion or injury  Left: No rash, tenderness, lesion or injury  Urethra: No prolapse, urethral pain, urethral swelling or urethral lesion  Vagina: Normal  No signs of injury and foreign body  No vaginal discharge, erythema, tenderness, bleeding, lesions or prolapsed vaginal walls  Cervix: No cervical motion tenderness, discharge, friability, lesion, erythema, cervical bleeding or eversion  Uterus: Not deviated, not enlarged, not fixed, not tender and no uterine prolapse  Adnexa:         Right: No mass, tenderness or fullness  Left: No mass, tenderness or fullness  Rectum: No external hemorrhoid        Comments: Urethra normal without lesions  No bladder tenderness  Musculoskeletal:         General: Normal range of motion  Cervical back: Normal range of motion and neck supple  Lymphadenopathy:      Lower Body: No right inguinal adenopathy  No left inguinal adenopathy  Skin:     General: Skin is warm and dry  Neurological:      Mental Status: She is alert and oriented to person, place, and time  Psychiatric:         Speech: Speech normal          Behavior: Behavior normal  Behavior is cooperative

## 2023-03-01 ENCOUNTER — ANNUAL EXAM (OUTPATIENT)
Dept: GYNECOLOGY | Facility: CLINIC | Age: 48
End: 2023-03-01

## 2023-03-01 VITALS
DIASTOLIC BLOOD PRESSURE: 80 MMHG | HEART RATE: 60 BPM | HEIGHT: 65 IN | BODY MASS INDEX: 20.16 KG/M2 | SYSTOLIC BLOOD PRESSURE: 140 MMHG | WEIGHT: 121 LBS

## 2023-03-01 DIAGNOSIS — Z01.419 ENCOUNTER FOR GYNECOLOGICAL EXAMINATION (GENERAL) (ROUTINE) WITHOUT ABNORMAL FINDINGS: Primary | ICD-10-CM

## 2023-03-01 DIAGNOSIS — Z12.31 SCREENING MAMMOGRAM FOR BREAST CANCER: ICD-10-CM

## 2023-04-28 ENCOUNTER — TELEPHONE (OUTPATIENT)
Dept: CARDIOLOGY CLINIC | Facility: CLINIC | Age: 48
End: 2023-04-28

## 2023-04-28 NOTE — TELEPHONE ENCOUNTER
LVM letting Pt know that there was a change in the providers schedule, and to please call our office to r/s

## 2023-06-11 LAB
25(OH)D3 SERPL-MCNC: 41 NG/ML (ref 30–100)
ALBUMIN SERPL-MCNC: 4.3 G/DL (ref 3.6–5.1)
ALBUMIN/GLOB SERPL: 1.8 (CALC) (ref 1–2.5)
ALP SERPL-CCNC: 68 U/L (ref 37–153)
ALT SERPL-CCNC: 12 U/L (ref 6–29)
AST SERPL-CCNC: 19 U/L (ref 10–35)
BILIRUB SERPL-MCNC: 0.6 MG/DL (ref 0.2–1.2)
BUN SERPL-MCNC: 23 MG/DL (ref 7–25)
BUN/CREAT SERPL: ABNORMAL (CALC) (ref 6–22)
CALCIUM SERPL-MCNC: 9.3 MG/DL (ref 8.6–10.4)
CHLORIDE SERPL-SCNC: 102 MMOL/L (ref 98–110)
CHOLEST SERPL-MCNC: 244 MG/DL
CHOLEST/HDLC SERPL: 3.5 (CALC)
CO2 SERPL-SCNC: 26 MMOL/L (ref 20–32)
CREAT SERPL-MCNC: 0.66 MG/DL (ref 0.5–1.03)
GFR/BSA.PRED SERPLBLD CYS-BASED-ARV: 107 ML/MIN/1.73M2
GLOBULIN SER CALC-MCNC: 2.4 G/DL (CALC) (ref 1.9–3.7)
GLUCOSE SERPL-MCNC: 100 MG/DL (ref 65–99)
HBA1C MFR BLD: 5.5 % OF TOTAL HGB
HDLC SERPL-MCNC: 70 MG/DL
LDLC SERPL CALC-MCNC: 162 MG/DL (CALC)
LDLC SERPL DIRECT ASSAY-MCNC: 161 MG/DL
MAGNESIUM SERPL-MCNC: 2 MG/DL (ref 1.5–2.5)
NONHDLC SERPL-MCNC: 174 MG/DL (CALC)
POTASSIUM SERPL-SCNC: 3.8 MMOL/L (ref 3.5–5.3)
PROT SERPL-MCNC: 6.7 G/DL (ref 6.1–8.1)
SODIUM SERPL-SCNC: 136 MMOL/L (ref 135–146)
TRIGL SERPL-MCNC: 39 MG/DL
TSH SERPL-ACNC: 1.16 MIU/L

## 2023-06-12 NOTE — RESULT ENCOUNTER NOTE
Stable labs - will review with patient at upcoming appointment  Hyperlipidemia-advise lifestyle modifications      The 10-year ASCVD risk score (Clifton KAUR, et al , 2019) is: 1 9%    Values used to calculate the score:      Age: 46 years      Sex: Female      Is Non- : No      Diabetic: No      Tobacco smoker: No      Systolic Blood Pressure: 478 mmHg      Is BP treated: Yes      HDL Cholesterol: 70 mg/dL      Total Cholesterol: 244 mg/dL

## 2023-06-19 NOTE — PROGRESS NOTES
Assessment/Plan:  Problem List Items Addressed This Visit        Digestive    Hepatomegaly     Management per GI  F/U PRN as W/U negative  Recommend lifestyle modifications  Relevant Orders    Comprehensive metabolic panel    Fatty liver     Management per GI  F/U PRN as W/U negative  Recommend lifestyle modifications  Relevant Orders    Comprehensive metabolic panel       Endocrine    IFG (impaired fasting glucose) - Primary     Improved  Patient declines Metformin XR, GLP-1 agonist, and Pre-DM education  Recommend lifestyle modifications  Relevant Orders    Comprehensive metabolic panel    Hemoglobin A1C       Respiratory    Seasonal allergic rhinitis     Stable on Claritin 10 mg daily p r n     Avoid decongestants  Cardiovascular and Mediastinum    Primary hypertension     Elevated BP in office today, which patient attributes to anxiety, Borderline BP at home  Continue Chlorthalidone 25mg QD and Losartan 100mg QD  Check blood pressure outside of office  Recommend lifestyle modifications  Relevant Medications    chlorthalidone 25 mg tablet    losartan (COZAAR) 100 MG tablet    Other Relevant Orders    Comprehensive metabolic panel    Magnesium       Genitourinary    OAB (overactive bladder)     Stable without meds  Management per Gyn  Other    Class 1 obesity with serious comorbidity and body mass index (BMI) of 34 0 to 34 9 in adult     Stable  Recommend lifestyle modifications  Patient declines weight management and GLP-1 agonist           Relevant Orders    TSH, 3rd generation with Free T4 reflex    Other hyperlipidemia     Stable s statin  Recommend lifestyle modifications      The 10-year ASCVD risk score (Clifton KAUR, et al , 2019) is: 2%    Values used to calculate the score:      Age: 46 years      Sex: Female      Is Non- : No      Diabetic: No      Tobacco smoker: No      Systolic Blood Pressure: 893 mmHg Is BP treated: Yes      HDL Cholesterol: 70 mg/dL      Total Cholesterol: 244 mg/dL           Relevant Orders    Comprehensive metabolic panel    Lipid panel    TSH, 3rd generation with Free T4 reflex    LDL cholesterol, direct    Vitamin D deficiency     Stable  Continue MVI and OTC Vitamin D 2,000IU daily  Relevant Orders    Comprehensive metabolic panel    Vitamin D 25 hydroxy        Return in about 6 months (around 12/20/2023) for 6mo - IFG, HTN, HL, Vit D Def, Labs  Future Appointments   Date Time Provider Ramiro Reed   8/17/2023  8:40 AM He Abel, DO [facility]   12/21/2023  8:00 AM Bianka Arnett, DO [facility]        Subjective:     Sukhdeep Goss is a 46 y o  female who presents today for a follow-up on her chronic medical conditions  HPI:  Chief Complaint   Patient presents with   • Results     Blood work   • Chronic Conditions     Follow-up     -- Above per clinical staff and reviewed  --      HPI      Today:      Return in about 4 months (around 6/16/2023) for 4mo - IFG, HTN, HL, Vit D Def, Labs  4mo OV        Obesity - Watching diet   She not adding salt to food   Does intermittent fasting during the work week   +Exercise - Gym -  / Weights / Fifi Ripa / Class for 1 hour, 6 times per week      IFG - No Metformin XR previously          HTN -  Management per Cardio Dr Maryann Villegas   Next appt 8/23   On Losartan 100mg QD and Chlorthalidone 25mg QD   BP check outside of office on arm cuff 130's/60's      Hyperlipidemia - No statin previously        Vitamin D Deficiency - s/p Nafisa   Taking MVI, and OTC Vitamin D 2,000IU daily          RUQ Abdominal Pain / Fatty Liver - Management per GI Dr Siena Cabezas   Next appt PRN    s/p RUQ U/S on 3/9/22 - stable gallbladder  Negative NAVA Fibrosure and abdominal ultrasound elastography with fat quantification (UGAP) - no hepatic fibrosis   Referred to Weight Management   She defers as she wants to work with the nutritionists at [facility], which is more affordable   Work-up negative for fibrosis        No Left Atrial Dilation (Measurement Error per Cardio) / Mitral Regurgitation / Tricuspid Regurgitation - Management per Cardio Dr Isidoro Angelucci   Next appt 8/23   Pending repeat Echo when HTN is controlled         OAB - Management per Ms Tonya OrozcoAYSE   Next appt 3/24   No meds        AR - Stable on Claritin PRN              Reviewed:  Labs 6/10/23, Gyn 3/1/23, Yessenia Massey Ms Tonya OrozcoAYSE at [facility] for [address] yearly   Next appt 3/24      Declines Shingrix, Tdap 6/20/23        PHQ-2/9 Depression Screening    Little interest or pleasure in doing things: 0 - not at all  Feeling down, depressed, or hopeless: 0 - not at all  PHQ-2 Score: 0  PHQ-2 Interpretation: Negative depression screen           The following portions of the patient's history were reviewed and updated as appropriate: allergies, current medications, past family history, past medical history, past social history, past surgical history and problem list       Review of Systems   Constitutional: Negative for appetite change, chills, diaphoresis, fatigue and fever  Respiratory: Negative for chest tightness and shortness of breath  Cardiovascular: Negative for chest pain  Gastrointestinal: Negative for abdominal pain, blood in stool, diarrhea, nausea and vomiting  Genitourinary: Negative for dysuria          Current Outpatient Medications   Medication Sig Dispense Refill   • chlorthalidone 25 mg tablet Take 1 tablet (25 mg total) by mouth daily 90 tablet 1   • GLUCOSAMINE-VITAMIN D PO Take 2 tablets by mouth in the morning Vitamin D 2000IU for 2 tabs     • loratadine (CLARITIN) 10 mg tablet Take 10 mg by mouth daily as needed     • losartan (COZAAR) 100 MG tablet Take 1 tablet (100 mg total) by mouth daily 90 tablet 1   • Multiple Vitamins-Minerals (WOMENS MULTIVITAMIN PO) "Take 1 tablet by mouth in the morning         No current facility-administered medications for this visit  Objective:  /90   Pulse (!) 52   Temp 97 5 °F (36 4 °C) (Temporal)   Resp 18   Ht 5' 5\" (1 651 m)   Wt 94 3 kg (208 lb)   LMP 09/24/2021 (Exact Date)   SpO2 98%   BMI 34 61 kg/m²    Wt Readings from Last 3 Encounters:   06/20/23 94 3 kg (208 lb)   04/11/23 54 9 kg (121 lb)   03/01/23 54 9 kg (121 lb)      BP Readings from Last 3 Encounters:   06/20/23 142/90   03/01/23 140/80   02/16/23 130/66          Physical Exam  Vitals and nursing note reviewed  Constitutional:       Appearance: Normal appearance  She is well-developed  She is obese  HENT:      Head: Normocephalic and atraumatic  Eyes:      Conjunctiva/sclera: Conjunctivae normal    Neck:      Thyroid: No thyromegaly  Cardiovascular:      Rate and Rhythm: Normal rate and regular rhythm  Pulses: Normal pulses  Heart sounds: Normal heart sounds  Pulmonary:      Effort: Pulmonary effort is normal       Breath sounds: Normal breath sounds  Abdominal:      General: Bowel sounds are normal  There is no distension  Palpations: Abdomen is soft  There is no mass  Tenderness: There is no abdominal tenderness  There is no guarding or rebound  Musculoskeletal:         General: No swelling or tenderness  Cervical back: Neck supple  Right lower leg: No edema  Left lower leg: No edema  Lymphadenopathy:      Cervical: No cervical adenopathy  Neurological:      General: No focal deficit present  Mental Status: She is alert and oriented to person, place, and time  Mental status is at baseline  Psychiatric:         Mood and Affect: Mood normal          Behavior: Behavior normal          Thought Content:  Thought content normal          Judgment: Judgment normal          Lab Results:      Lab Results   Component Value Date    WBC 7 2 02/04/2023    HGB 11 9 02/04/2023    HCT 33 9 (L) 02/04/2023    " " 02/04/2023    TRIG 39 06/10/2023    HDL 70 06/10/2023    LDLDIRECT 161 (H) 06/10/2023    ALT 12 06/10/2023    AST 19 06/10/2023    K 3 8 06/10/2023     06/10/2023    CREATININE 0 66 06/10/2023    BUN 23 06/10/2023    CO2 26 06/10/2023    GLUF 119 (H) 07/30/2022    HGBA1C 5 5 06/10/2023     No results found for: \"URICACID\"  Invalid input(s): \"BASENAME\" Vitamin D    No results found  POCT Labs        Depression Screening and Follow-up Plan: Patient was screened for depression during today's encounter   They screened negative with a PHQ-2 score of 0                     "

## 2023-06-20 ENCOUNTER — OFFICE VISIT (OUTPATIENT)
Dept: FAMILY MEDICINE CLINIC | Facility: CLINIC | Age: 48
End: 2023-06-20
Payer: COMMERCIAL

## 2023-06-20 VITALS
DIASTOLIC BLOOD PRESSURE: 90 MMHG | RESPIRATION RATE: 18 BRPM | HEIGHT: 65 IN | OXYGEN SATURATION: 98 % | BODY MASS INDEX: 34.66 KG/M2 | TEMPERATURE: 97.5 F | HEART RATE: 52 BPM | WEIGHT: 208 LBS | SYSTOLIC BLOOD PRESSURE: 142 MMHG

## 2023-06-20 DIAGNOSIS — K76.0 FATTY LIVER: ICD-10-CM

## 2023-06-20 DIAGNOSIS — R73.01 IFG (IMPAIRED FASTING GLUCOSE): Primary | ICD-10-CM

## 2023-06-20 DIAGNOSIS — R16.0 HEPATOMEGALY: ICD-10-CM

## 2023-06-20 DIAGNOSIS — I10 PRIMARY HYPERTENSION: ICD-10-CM

## 2023-06-20 DIAGNOSIS — J30.2 SEASONAL ALLERGIC RHINITIS, UNSPECIFIED TRIGGER: ICD-10-CM

## 2023-06-20 DIAGNOSIS — N32.81 OAB (OVERACTIVE BLADDER): ICD-10-CM

## 2023-06-20 DIAGNOSIS — E55.9 VITAMIN D DEFICIENCY: ICD-10-CM

## 2023-06-20 DIAGNOSIS — E78.49 OTHER HYPERLIPIDEMIA: ICD-10-CM

## 2023-06-20 DIAGNOSIS — E66.9 CLASS 1 OBESITY WITH SERIOUS COMORBIDITY AND BODY MASS INDEX (BMI) OF 34.0 TO 34.9 IN ADULT, UNSPECIFIED OBESITY TYPE: ICD-10-CM

## 2023-06-20 PROBLEM — I51.7 LEFT ATRIAL DILATATION: Status: RESOLVED | Noted: 2022-03-17 | Resolved: 2023-06-20

## 2023-06-20 PROCEDURE — 99214 OFFICE O/P EST MOD 30 MIN: CPT | Performed by: FAMILY MEDICINE

## 2023-06-20 RX ORDER — CHLORTHALIDONE 25 MG/1
25 TABLET ORAL DAILY
Qty: 90 TABLET | Refills: 1 | Status: SHIPPED | OUTPATIENT
Start: 2023-06-20

## 2023-06-20 RX ORDER — LOSARTAN POTASSIUM 100 MG/1
100 TABLET ORAL DAILY
Qty: 90 TABLET | Refills: 1 | Status: SHIPPED | OUTPATIENT
Start: 2023-06-20

## 2023-06-20 NOTE — ASSESSMENT & PLAN NOTE
Improved  Patient declines Metformin XR, GLP-1 agonist, and Pre-DM education  Recommend lifestyle modifications

## 2023-06-20 NOTE — PATIENT INSTRUCTIONS
Check blood pressure outside of office  Proper Blood Pressure Cuff Use:      Sit in a chair with back support, with both feet flat on the floor, for at least 5 minutes prior to measuring blood pressure  Take 3 different blood pressure readings  The 1st blood pressure reading will usually be the highest   Use an average of these 3 readings as your working blood pressure  For patients 72years old and older without kidney disease, a blood pressure of less than 150/90 is acceptable  For other age groups, normal blood pressure is considered to be 120's/80's, borderline blood pressure 135/85, and high blood pressure is consistently 140's/90's  Please schedule appointment to evaluate blood pressure if your blood pressure readings are consistently high

## 2023-06-20 NOTE — ASSESSMENT & PLAN NOTE
Stable s statin  Recommend lifestyle modifications      The 10-year ASCVD risk score (Clifton KAUR, et al , 2019) is: 2%    Values used to calculate the score:      Age: 46 years      Sex: Female      Is Non- : No      Diabetic: No      Tobacco smoker: No      Systolic Blood Pressure: 405 mmHg      Is BP treated: Yes      HDL Cholesterol: 70 mg/dL      Total Cholesterol: 244 mg/dL

## 2023-06-20 NOTE — ASSESSMENT & PLAN NOTE
Elevated BP in office today, which patient attributes to anxiety, Borderline BP at home  Continue Chlorthalidone 25mg QD and Losartan 100mg QD  Check blood pressure outside of office  Recommend lifestyle modifications

## 2023-10-11 ENCOUNTER — VBI (OUTPATIENT)
Dept: ADMINISTRATIVE | Facility: OTHER | Age: 48
End: 2023-10-11

## 2023-12-17 LAB
25(OH)D3 SERPL-MCNC: 51 NG/ML (ref 30–100)
ALBUMIN SERPL-MCNC: 4.4 G/DL (ref 3.6–5.1)
ALBUMIN/GLOB SERPL: 1.8 (CALC) (ref 1–2.5)
ALP SERPL-CCNC: 70 U/L (ref 37–153)
ALT SERPL-CCNC: 13 U/L (ref 6–29)
AST SERPL-CCNC: 16 U/L (ref 10–35)
BILIRUB SERPL-MCNC: 0.6 MG/DL (ref 0.2–1.2)
BUN SERPL-MCNC: 24 MG/DL (ref 7–25)
BUN/CREAT SERPL: ABNORMAL (CALC) (ref 6–22)
CALCIUM SERPL-MCNC: 9.4 MG/DL (ref 8.6–10.4)
CHLORIDE SERPL-SCNC: 101 MMOL/L (ref 98–110)
CHOLEST SERPL-MCNC: 242 MG/DL
CHOLEST/HDLC SERPL: 3.3 (CALC)
CO2 SERPL-SCNC: 30 MMOL/L (ref 20–32)
CREAT SERPL-MCNC: 0.63 MG/DL (ref 0.5–1.03)
GFR/BSA.PRED SERPLBLD CYS-BASED-ARV: 108 ML/MIN/1.73M2
GLOBULIN SER CALC-MCNC: 2.4 G/DL (CALC) (ref 1.9–3.7)
GLUCOSE SERPL-MCNC: 102 MG/DL (ref 65–99)
HBA1C MFR BLD: 5.4 % OF TOTAL HGB
HDLC SERPL-MCNC: 74 MG/DL
LDLC SERPL CALC-MCNC: 155 MG/DL (CALC)
LDLC SERPL DIRECT ASSAY-MCNC: 155 MG/DL
MAGNESIUM SERPL-MCNC: 1.9 MG/DL (ref 1.5–2.5)
NONHDLC SERPL-MCNC: 168 MG/DL (CALC)
POTASSIUM SERPL-SCNC: 4.6 MMOL/L (ref 3.5–5.3)
PROT SERPL-MCNC: 6.8 G/DL (ref 6.1–8.1)
SODIUM SERPL-SCNC: 137 MMOL/L (ref 135–146)
TRIGL SERPL-MCNC: 42 MG/DL
TSH SERPL-ACNC: 1.87 MIU/L

## 2023-12-20 NOTE — PROGRESS NOTES
Assessment/Plan:  Problem List Items Addressed This Visit        Digestive    Hepatomegaly     Management per GI.  F/U PRN as W/U negative.  Recommend lifestyle modifications.           Fatty liver     Management per GI.  F/U PRN as W/U negative.  Recommend lifestyle modifications.           Relevant Orders    Comprehensive metabolic panel       Endocrine    IFG (impaired fasting glucose) - Primary     Stable.  Patient declines Metformin XR, GLP-1 agonist, and Pre-DM education.  Recommend lifestyle modifications.             Relevant Orders    Comprehensive metabolic panel    Hemoglobin A1C       Respiratory    Seasonal allergic rhinitis     Stable on Claritin 10 mg daily p.r.n..  Avoid decongestants.            Cardiovascular and Mediastinum    Primary hypertension     Stable.  Continue Chlorthalidone 25mg QD and Losartan 100mg QD.  Check blood pressure outside of office.  Recommend lifestyle modifications.           Relevant Medications    chlorthalidone 25 mg tablet    losartan (COZAAR) 100 MG tablet    Other Relevant Orders    Magnesium    Mitral regurgitation     Stable.  Management per Cardio.           Tricuspid regurgitation     Stable.  Management per Cardio.             Genitourinary    OAB (overactive bladder)     Stable without meds.  Management per Gyn.              Other    Class 1 obesity with serious comorbidity and body mass index (BMI) of 33.0 to 33.9 in adult     Improved.  Recommend lifestyle modifications.  Patient declines weight management and GLP-1 agonist.          Relevant Orders    Vitamin D 25 hydroxy    Other hyperlipidemia     Stable s statin.  Recommend lifestyle modifications.    The 10-year ASCVD risk score (Clifton KAUR, et al., 2019) is: 1.4%    Values used to calculate the score:      Age: 48 years      Sex: Female      Is Non- : No      Diabetic: No      Tobacco smoker: No      Systolic Blood Pressure: 126 mmHg      Is BP treated: Yes      HDL Cholesterol: 74  mg/dL      Total Cholesterol: 242 mg/dL           Relevant Orders    CBC and differential    Comprehensive metabolic panel    Lipid panel    TSH, 3rd generation with Free T4 reflex    LDL cholesterol, direct    Vitamin D deficiency     Stable.  Continue MVI and OTC Vitamin D 2,000IU daily.           Relevant Orders    Comprehensive metabolic panel    Vitamin D 25 hydroxy   Other Visit Diagnoses     Encounter for immunization        Relevant Orders    Zoster Vaccine Recombinant IM    Zoster Vaccine Recombinant IM    Obesity (BMI 30.0-34.9)        Relevant Orders    Vitamin D 25 hydroxy    BMI 33.0-33.9,adult        Relevant Orders    Vitamin D 25 hydroxy    Screening for diabetes mellitus        Relevant Orders    Hemoglobin A1C    Screening for cardiovascular condition        Relevant Orders    CBC and differential    Comprehensive metabolic panel    Lipid panel    LDL cholesterol, direct           Return in about 6 months (around 6/21/2024) for Physical / 6mo - IFG, HTN, HL, Vit D Def, Labs; PRN Shingrix #1, 2mo later Shingrix #2.      Future Appointments   Date Time Provider Department Center   6/25/2024  8:00 AM Azalia Finn,  [facility]          Subjective:     Sarah is a 48 y.o. female who presents today for a follow-up on her chronic medical conditions.        HPI:  Chief Complaint   Patient presents with   • Follow-up     6mo - IFG, HTN, HL, Vit D Def, Labs. No new problems or concerns at this time.    • HM     No additional covid boosters. Declines flu shot today. Last Tdap is unknown. Does want to get Shingrix at a later date.      -- Above per clinical staff and reviewed. --      HPI      Today:      Return in about 6 months (around 12/20/2023) for 6mo - IFG, HTN, HL, Vit D Def, Labs.     6mo OV    Patient did not complete labs 6/20/2023        Obesity - Watching diet.  She not adding salt to food.  Does intermittent fasting during the work week.  +Exercise - Gym -  /  Weights / Cardio / Class for 1 hour, 6 times per week.     IFG - No Metformin XR previously.         HTN -  Management per Cardio Dr. He Mckeon.  Next appt 1/24.  On Losartan 100mg QD and Chlorthalidone 25mg QD.  BP check outside of office on arm cuff 120-137/55-73.     Hyperlipidemia - No statin previously.       Vitamin D Deficiency - s/p Drisdol.  Taking MVI, and OTC Vitamin D 2,000IU daily.        RUQ Abdominal Pain / Fatty Liver - Management per GI Dr. Aureliano Mckeon.  Next appt PRN.  s/p RUQ U/S on 3/9/22 - stable gallbladder. Negative NAVA Fibrosure and abdominal ultrasound elastography with fat quantification (UGAP) - no hepatic fibrosis.  Referred to Weight Management.  She defers as she wants to work with the nutritionists at the gym, which is more affordable.  Work-up negative for fibrosis.       No Left Atrial Dilation (Measurement Error per Cardio) / Mitral Regurgitation / Tricuspid Regurgitation - Management per Cardio Dr. He Mckeon.  Next appt 1/24.  Pending repeat Echo when HTN is controlled.        OAB - Management per Gyn AYSE Wynne.  Next appt 3/24.  No meds.       AR - Stable on Claritin PRN.            Reviewed:  Labs 12/16/23, Gyn 3/1/23, GI 8/4/22, Cardio 1/10/23     Sees Gyn AYSE Wynne at [facility] yearly.  Next appt 3/24.      Declines Shingrix, Tdap 6/20/23.        The following portions of the patient's history were reviewed and updated as appropriate: allergies, current medications, past family history, past medical history, past social history, past surgical history and problem list.      Review of Systems   Constitutional:  Negative for appetite change, chills, diaphoresis, fatigue and fever.   Respiratory:  Negative for chest tightness and shortness of breath.    Cardiovascular:  Negative for chest pain.   Gastrointestinal:  Negative for abdominal pain, blood in stool, diarrhea, nausea and vomiting.   Genitourinary:   "Negative for dysuria.        Current Outpatient Medications   Medication Sig Dispense Refill   • chlorthalidone 25 mg tablet Take 1 tablet (25 mg total) by mouth daily 90 tablet 1   • GLUCOSAMINE-VITAMIN D PO Take 2 tablets by mouth in the morning Vitamin D 2000IU in 2 tabs     • loratadine (CLARITIN) 10 mg tablet Take 10 mg by mouth daily as needed     • losartan (COZAAR) 100 MG tablet Take 1 tablet (100 mg total) by mouth daily 90 tablet 1   • Multiple Vitamins-Minerals (WOMENS MULTIVITAMIN PO) Take 1 tablet by mouth in the morning         No current facility-administered medications for this visit.       Objective:  /70   Pulse 66   Temp 97.5 °F (36.4 °C)   Resp 16   Ht 5' 5\" (1.651 m)   Wt 90.4 kg (199 lb 6.4 oz)   LMP 09/24/2021 (Exact Date)   SpO2 100%   BMI 33.18 kg/m²    Wt Readings from Last 3 Encounters:   12/21/23 90.4 kg (199 lb 6.4 oz)   06/20/23 94.3 kg (208 lb)   04/11/23 54.9 kg (121 lb)      BP Readings from Last 3 Encounters:   12/21/23 126/70   06/20/23 142/90   03/01/23 140/80          Physical Exam  Vitals and nursing note reviewed.   Constitutional:       Appearance: Normal appearance. She is well-developed. She is obese.   HENT:      Head: Normocephalic and atraumatic.   Eyes:      Conjunctiva/sclera: Conjunctivae normal.   Neck:      Thyroid: No thyromegaly.   Cardiovascular:      Rate and Rhythm: Normal rate and regular rhythm.      Pulses: Normal pulses.      Heart sounds: Normal heart sounds.   Pulmonary:      Effort: Pulmonary effort is normal.      Breath sounds: Normal breath sounds.   Abdominal:      General: Bowel sounds are normal. There is no distension.      Palpations: Abdomen is soft. There is no mass.      Tenderness: There is no abdominal tenderness. There is no guarding or rebound.   Musculoskeletal:         General: No swelling or tenderness.      Cervical back: Neck supple.      Right lower leg: No edema.      Left lower leg: No edema.   Lymphadenopathy:      " "Cervical: No cervical adenopathy.   Neurological:      General: No focal deficit present.      Mental Status: She is alert and oriented to person, place, and time.   Psychiatric:         Mood and Affect: Mood normal.         Lab Results:      Lab Results   Component Value Date    WBC 7.2 02/04/2023    HGB 11.9 02/04/2023    HCT 33.9 (L) 02/04/2023     02/04/2023    TRIG 42 12/16/2023    HDL 74 12/16/2023    LDLDIRECT 155 (H) 12/16/2023    ALT 13 12/16/2023    AST 16 12/16/2023    K 4.6 12/16/2023     12/16/2023    CREATININE 0.63 12/16/2023    BUN 24 12/16/2023    CO2 30 12/16/2023    GLUF 119 (H) 07/30/2022    HGBA1C 5.4 12/16/2023     No results found for: \"URICACID\"  Invalid input(s): \"BASENAME\" Vitamin D    No results found.     POCT Labs                     BMI Counseling: Body mass index is 33.18 kg/m². The BMI is above normal. Nutrition recommendations include 3-5 servings of fruits/vegetables daily. Exercise recommendations include exercising 3-5 times per week.  "

## 2023-12-21 ENCOUNTER — OFFICE VISIT (OUTPATIENT)
Dept: FAMILY MEDICINE CLINIC | Facility: CLINIC | Age: 48
End: 2023-12-21
Payer: COMMERCIAL

## 2023-12-21 VITALS
HEIGHT: 65 IN | RESPIRATION RATE: 16 BRPM | OXYGEN SATURATION: 100 % | SYSTOLIC BLOOD PRESSURE: 126 MMHG | HEART RATE: 66 BPM | WEIGHT: 199.4 LBS | DIASTOLIC BLOOD PRESSURE: 70 MMHG | BODY MASS INDEX: 33.22 KG/M2 | TEMPERATURE: 97.5 F

## 2023-12-21 DIAGNOSIS — E78.49 OTHER HYPERLIPIDEMIA: ICD-10-CM

## 2023-12-21 DIAGNOSIS — I34.0 MITRAL VALVE INSUFFICIENCY, UNSPECIFIED ETIOLOGY: ICD-10-CM

## 2023-12-21 DIAGNOSIS — K76.0 FATTY LIVER: ICD-10-CM

## 2023-12-21 DIAGNOSIS — J30.2 SEASONAL ALLERGIC RHINITIS, UNSPECIFIED TRIGGER: ICD-10-CM

## 2023-12-21 DIAGNOSIS — E55.9 VITAMIN D DEFICIENCY: ICD-10-CM

## 2023-12-21 DIAGNOSIS — R16.0 HEPATOMEGALY: ICD-10-CM

## 2023-12-21 DIAGNOSIS — N32.81 OAB (OVERACTIVE BLADDER): ICD-10-CM

## 2023-12-21 DIAGNOSIS — I10 PRIMARY HYPERTENSION: ICD-10-CM

## 2023-12-21 DIAGNOSIS — R73.01 IFG (IMPAIRED FASTING GLUCOSE): Primary | ICD-10-CM

## 2023-12-21 DIAGNOSIS — Z13.6 SCREENING FOR CARDIOVASCULAR CONDITION: ICD-10-CM

## 2023-12-21 DIAGNOSIS — I07.1 TRICUSPID VALVE INSUFFICIENCY, UNSPECIFIED ETIOLOGY: ICD-10-CM

## 2023-12-21 DIAGNOSIS — E66.9 CLASS 1 OBESITY WITH SERIOUS COMORBIDITY AND BODY MASS INDEX (BMI) OF 33.0 TO 33.9 IN ADULT, UNSPECIFIED OBESITY TYPE: ICD-10-CM

## 2023-12-21 DIAGNOSIS — E66.9 OBESITY (BMI 30.0-34.9): ICD-10-CM

## 2023-12-21 DIAGNOSIS — Z13.1 SCREENING FOR DIABETES MELLITUS: ICD-10-CM

## 2023-12-21 DIAGNOSIS — Z23 ENCOUNTER FOR IMMUNIZATION: ICD-10-CM

## 2023-12-21 PROBLEM — E66.811 CLASS 1 OBESITY WITH SERIOUS COMORBIDITY AND BODY MASS INDEX (BMI) OF 34.0 TO 34.9 IN ADULT: Status: RESOLVED | Noted: 2022-02-15 | Resolved: 2023-12-21

## 2023-12-21 PROCEDURE — 99214 OFFICE O/P EST MOD 30 MIN: CPT | Performed by: FAMILY MEDICINE

## 2023-12-21 RX ORDER — CHLORTHALIDONE 25 MG/1
25 TABLET ORAL DAILY
Qty: 90 TABLET | Refills: 1 | Status: SHIPPED | OUTPATIENT
Start: 2023-12-21

## 2023-12-21 RX ORDER — LOSARTAN POTASSIUM 100 MG/1
100 TABLET ORAL DAILY
Qty: 90 TABLET | Refills: 1 | Status: SHIPPED | OUTPATIENT
Start: 2023-12-21

## 2023-12-21 NOTE — PATIENT INSTRUCTIONS
Obesity   AMBULATORY CARE:   Obesity  means your body mass index (BMI) is greater than 30. Your healthcare provider will use your age, height, and weight to measure your BMI.  The risks of obesity include  many health problems, including injuries or physical disability.  Diabetes (high blood sugar level)    High blood pressure or high cholesterol    Heart disease or heart failure    Stroke    Gallbladder or liver disease    Cancer of the colon, breast, prostate, liver, or kidney    Sleep apnea    Arthritis or gout    Screening  is done to check for health conditions before you have signs or symptoms. If you are 35 to 70 years old, your blood sugar level may be checked every 3 years for signs of prediabetes or diabetes. Your healthcare provider will check your blood pressure at each visit. High blood pressure can lead to a stroke or other problems. Your provider may check for signs of heart disease, cancer, or other health problems.  Seek care immediately if:   You have a severe headache, confusion, or difficulty speaking.    You have weakness on one side of your body.    You have chest pain, sweating, or shortness of breath.    Call your doctor if:   You have symptoms of gallbladder or liver disease, such as pain in your upper abdomen.    You have knee or hip pain and discomfort while walking.    You have symptoms of diabetes, such as intense hunger and thirst, and frequent urination.    You have symptoms of sleep apnea, such as snoring or daytime sleepiness.    You have questions or concerns about your condition or care.    Treatment for obesity  focuses on helping you lose weight to improve your health. Even a small decrease in BMI can reduce the risk for many health problems. Your healthcare provider will help you set a weight-loss goal.  Lifestyle changes  are the first step in treating obesity. These include making healthy food choices and getting regular physical activity. Your healthcare provider may suggest  a weight-loss program that involves coaching, education, and therapy.    Medicine  may help you lose weight when it is used with a healthy foods and physical activity.    Surgery  can help you lose weight if you have obesity along with other health problems. Several types of weight-loss surgery are available. Ask your healthcare provider for more information.    Tips for safe weight loss:   Set small, realistic goals.  An example of a small goal is to walk for 20 minutes 5 days a week. Anther goal is to lose 5% of your body weight.    Ask for support.  Tell friends, family members, and coworkers about your goals. Ask someone to lose weight with you. You may also want to join a weight-loss support group.    Identify foods or triggers that may cause you to overeat.  Remove tempting high-calorie foods from your home and workplace. Place a bowl of fresh fruit on your kitchen counter. If stress causes you to eat, find other ways to cope with stress. A counselor or therapist may be able to help you.    Track your daily calories and activity.  Write down what you eat and drink. Also write down how many minutes of physical activity you do each day.     Track your weekly weight.  Weigh yourself in the morning, before you eat or drink anything but after you use the bathroom. Use the same scale, in the same place, and in similar clothing each time. Only weigh yourself 1 to 2 times each week, or as directed. You may become discouraged if you weigh yourself every day.    Eating changes:  You will need to eat 500 to 1,000 fewer calories each day than you currently eat to lose 1 to 2 pounds a week. The following changes will help you cut calories:  Eat smaller portions.  Use small plates, no larger than 9 inches in diameter. Fill your plate half full of fruits and vegetables. Measure your food using measuring cups until you know what a serving size looks like.         Eat 3 meals and 1 or 2 snacks each day.  Plan your meals in  advance. Cook and eat at home most of the time. Eat slowly. Do not skip meals. Skipping meals can lead to overeating later in the day. This can make it harder for you to lose weight. Talk with a dietitian to help you make a meal plan and schedule that is right for you.    Eat fruits and vegetables at every meal.  They are low in calories and high in fiber, which makes you feel full. Do not add butter, margarine, or cream sauce to vegetables. Use herbs to season steamed vegetables.    Eat less fat and fewer fried foods.  Eat more baked or grilled chicken and fish. These protein sources are lower in calories and fat than red meat. Limit fast food. Dress your salads with olive oil and vinegar instead of bottled dressing.    Limit the amount of sugar you eat.  Do not drink sugary beverages. Limit alcohol.       Activity changes:  Physical activity is good for your body in many ways. It helps you burn calories and build strong muscles. It decreases stress and depression, and improves your mood. It can also help you sleep better. Talk to your healthcare provider before you begin an exercise program.  Exercise for at least 30 minutes 5 days a week.  Start slowly. Set aside time each day for physical activity that you enjoy and that is convenient for you. It is best to do both weight training and an activity that increases your heart rate, such as walking, bicycling, or swimming.            Find ways to be more active.  Do yard work and housecleaning. Walk up the stairs instead of using elevators. Spend your leisure time going to events that require walking, such as outdoor festivals or fairs. This extra physical activity can help you lose weight and keep it off.       Follow up with your doctor as directed:  You may need to meet with a dietitian. Write down your questions so you remember to ask them during your visits.  © Copyright [employer] 2023 Information is for End User's use only and may not be sold, redistributed or  otherwise used for commercial purposes.  The above information is an  only. It is not intended as medical advice for individual conditions or treatments. Talk to your doctor, nurse or pharmacist before following any medical regimen to see if it is safe and effective for you.

## 2023-12-23 NOTE — ASSESSMENT & PLAN NOTE
Stable.  Patient declines Metformin XR, GLP-1 agonist, and Pre-DM education.  Recommend lifestyle modifications.

## 2023-12-23 NOTE — ASSESSMENT & PLAN NOTE
Stable s statin.  Recommend lifestyle modifications.    The 10-year ASCVD risk score (Clifton KAUR, et al., 2019) is: 1.4%    Values used to calculate the score:      Age: 48 years      Sex: Female      Is Non- : No      Diabetic: No      Tobacco smoker: No      Systolic Blood Pressure: 126 mmHg      Is BP treated: Yes      HDL Cholesterol: 74 mg/dL      Total Cholesterol: 242 mg/dL

## 2023-12-23 NOTE — ASSESSMENT & PLAN NOTE
Stable.  Continue Chlorthalidone 25mg QD and Losartan 100mg QD.  Check blood pressure outside of office.  Recommend lifestyle modifications.

## 2023-12-23 NOTE — ASSESSMENT & PLAN NOTE
Improved.  Recommend lifestyle modifications.  Patient declines weight management and GLP-1 agonist.

## 2024-01-10 ENCOUNTER — VBI (OUTPATIENT)
Dept: ADMINISTRATIVE | Facility: OTHER | Age: 49
End: 2024-01-10

## 2024-02-17 ENCOUNTER — OFFICE VISIT (OUTPATIENT)
Dept: URGENT CARE | Facility: CLINIC | Age: 49
End: 2024-02-17
Payer: COMMERCIAL

## 2024-02-17 VITALS
SYSTOLIC BLOOD PRESSURE: 145 MMHG | HEART RATE: 64 BPM | OXYGEN SATURATION: 99 % | DIASTOLIC BLOOD PRESSURE: 67 MMHG | TEMPERATURE: 98.1 F | RESPIRATION RATE: 18 BRPM

## 2024-02-17 DIAGNOSIS — J01.10 ACUTE NON-RECURRENT FRONTAL SINUSITIS: Primary | ICD-10-CM

## 2024-02-17 PROCEDURE — S9088 SERVICES PROVIDED IN URGENT: HCPCS

## 2024-02-17 PROCEDURE — 99213 OFFICE O/P EST LOW 20 MIN: CPT

## 2024-02-17 RX ORDER — FLUTICASONE PROPIONATE 50 MCG
1 SPRAY, SUSPENSION (ML) NASAL DAILY
Qty: 11.1 ML | Refills: 0 | Status: SHIPPED | OUTPATIENT
Start: 2024-02-17

## 2024-02-17 RX ORDER — DM/ACETAMINOPHEN/DOXYLAMINE 10-325/15
10 LIQUID (ML) ORAL 3 TIMES DAILY PRN
Qty: 355 ML | Refills: 0 | Status: SHIPPED | OUTPATIENT
Start: 2024-02-17

## 2024-02-17 RX ORDER — PEDI MULTIVIT NO.27/FOLIC ACID 100 MCG
TABLET,CHEWABLE ORAL
COMMUNITY
End: 2024-02-17 | Stop reason: CLARIF

## 2024-02-17 NOTE — PROGRESS NOTES
[facility]        NAME: Sarah Whitley is a 49 y.o. female  : 1975    MRN: [id number]  DATE: 2024  TIME: 9:56 AM    Assessment and Plan   Acute non-recurrent frontal sinusitis [J01.10]  1. Acute non-recurrent frontal sinusitis  Dextromethorphan-GG-APAP (Coricidin HBP Cold/Cough/Flu) -325 MG/15ML LIQD    fluticasone (FLONASE) 50 mcg/act nasal spray      Please begin Coricidin cough/decongestant medication, discontinue Hanna-[place] cold and sinus.   Begin Flonase nasal spray as directed.   Stay well hydrated.   Follow up with PCP if no relief within one week.       Patient Instructions   Sinusitis   WHAT YOU NEED TO KNOW:   Sinusitis is inflammation or infection of your sinuses. Sinusitis is most often caused by a virus. Acute sinusitis may last up to 12 weeks. Chronic sinusitis lasts longer than 12 weeks. Recurrent sinusitis means you have 4 or more infections in 1 year.         DISCHARGE INSTRUCTIONS:   Return to the emergency department if:   You have trouble breathing or wheezing that is getting worse.     You have a stiff neck, a fever, or a bad headache.      You cannot open your eye.      Your eyeball bulges out or you cannot move your eye.      You are more sleepy than normal, or you notice changes in your ability to think, move, or talk.     You have swelling of your forehead or scalp.     Call your doctor if:   You have vision changes, such as double vision.     Your eye and eyelid are red, swollen, and painful.      Your symptoms do not improve or go away after 10 days.     You have nausea and are vomiting.     Your nose is bleeding.     You have questions or concerns about your condition or care.     Medicines:  Your symptoms may go away on their own. Your healthcare provider may recommend watchful waiting for up to 10 days before starting antibiotics. You may need any of the following:  Acetaminophen  decreases pain and fever. It is available without a doctor's order.  Ask how much to take and how often to take it. Follow directions. Read the labels of all other medicines you are using to see if they also contain acetaminophen, or ask your doctor or pharmacist. Acetaminophen can cause liver damage if not taken correctly.     NSAIDs , such as ibuprofen, help decrease swelling, pain, and fever. This medicine is available with or without a doctor's order. NSAIDs can cause stomach bleeding or kidney problems in certain people. If you take blood thinner medicine, always ask your healthcare provider if NSAIDs are safe for you. Always read the medicine label and follow directions.     Nasal steroid sprays  may help decrease inflammation in your nose and sinuses.     Decongestants  help reduce swelling and drain mucus in the nose and sinuses. They may help you breathe easier.      Antihistamines  help dry mucus in the nose and relieve sneezing.      Antibiotics  help treat or prevent a bacterial infection.     Take your medicine as directed.  Contact your healthcare provider if you think your medicine is not helping or if you have side effects. Tell your provider if you are allergic to any medicine. Keep a list of the medicines, vitamins, and herbs you take. Include the amounts, and when and why you take them. Bring the list or the pill bottles to follow-up visits. Carry your medicine list with you in case of an emergency.     Self-care:   Rinse your sinuses as directed.  Use a sinus rinse device to rinse your nasal passages with a saline (salt water) solution or distilled water. Do not use tap water. This will help thin the mucus in your nose and rinse away pollen and dirt. It will also help reduce swelling so you can breathe normally.     Use a humidifier  to increase air moisture in your home. This may make it easier for you to breathe and help decrease your cough.      Sleep with your head elevated.  Place an extra pillow under your head before you go to sleep to help your sinuses  drain.      Drink liquids as directed.  Ask your healthcare provider how much liquid to drink each day and which liquids are best for you. Liquids will thin the mucus in your nose and help it drain. Avoid drinks that contain alcohol or caffeine.      Do not smoke, and avoid secondhand smoke.  Nicotine and other chemicals in cigarettes and cigars can make your symptoms worse. Ask your healthcare provider for information if you currently smoke and need help to quit. E-cigarettes or smokeless tobacco still contain nicotine. Talk to your healthcare provider before you use these products.     Prevent the spread of germs:   Wash your hands often with soap and water.  Wash your hands after you use the bathroom, change a child's diaper, or sneeze. Wash your hands before you prepare or eat food.          Stay away from people who are sick.  Some germs spread easily and quickly through contact.     Follow up with your doctor as directed:  You may be referred to an ear, nose, and throat specialist. Write down your questions so you remember to ask them during your visits.   © Copyright [employer] 2023 Information is for End User's use only and may not be sold, redistributed or otherwise used for commercial purposes.  The above information is an  only. It is not intended as medical advice for individual conditions or treatments. Talk to your doctor, nurse or pharmacist before following any medical regimen to see if it is safe and effective for you.       Follow up with PCP in 3-5 days.  Proceed to  ER if symptoms worsen.    Chief Complaint     Chief Complaint   Patient presents with    Cough     Cough from post nasal drip. congestion, sinus pressure x5 days. No fevers.          History of Present Illness       Cough  This is a new problem. The current episode started in the past 7 days (x 5 days). The problem has been unchanged. Associated symptoms include nasal congestion, postnasal drip and rhinorrhea. Pertinent  negatives include no chest pain, chills, ear congestion, ear pain, eye redness, fever, headaches, heartburn, hemoptysis, myalgias, rash, sore throat, shortness of breath, sweats, weight loss or wheezing. Nothing aggravates the symptoms. She has tried nothing for the symptoms. The treatment provided no relief. There is no history of asthma, bronchiectasis, bronchitis, COPD, emphysema, environmental allergies or pneumonia.       Review of Systems   Review of Systems   Constitutional:  Negative for chills, fatigue, fever and weight loss.   HENT:  Positive for postnasal drip, rhinorrhea and sinus pressure. Negative for congestion, ear discharge, ear pain, sinus pain, sneezing and sore throat.    Eyes: Negative.  Negative for pain, discharge, redness and itching.   Respiratory:  Positive for cough. Negative for apnea, hemoptysis, choking, chest tightness, shortness of breath, wheezing and stridor.    Cardiovascular: Negative.  Negative for chest pain and palpitations.   Gastrointestinal: Negative.  Negative for diarrhea, heartburn, nausea and vomiting.   Endocrine: Negative.  Negative for polydipsia, polyphagia and polyuria.   Genitourinary: Negative.  Negative for decreased urine volume and flank pain.   Musculoskeletal: Negative.  Negative for arthralgias, back pain, myalgias, neck pain and neck stiffness.   Skin: Negative.  Negative for color change and rash.   Allergic/Immunologic: Negative.  Negative for environmental allergies.   Neurological: Negative.  Negative for dizziness, facial asymmetry, light-headedness, numbness and headaches.   Hematological: Negative.  Negative for adenopathy.   Psychiatric/Behavioral: Negative.           Current Medications       Current Outpatient Medications:     chlorthalidone 25 mg tablet, Take 1 tablet (25 mg total) by mouth daily, Disp: 90 tablet, Rfl: 1    Dextromethorphan-GG-APAP (Coricidin HBP Cold/Cough/Flu) -325 MG/15ML LIQD, Take 10 mL by mouth 3 (three) times a day  as needed (cough, congestion), Disp: 355 mL, Rfl: 0    fluticasone (FLONASE) 50 mcg/act nasal spray, 1 spray into each nostril daily, Disp: 11.1 mL, Rfl: 0    GLUCOSAMINE-VITAMIN D PO, Take 2 tablets by mouth in the morning Vitamin D 2000IU in 2 tabs, Disp: , Rfl:     loratadine (CLARITIN) 10 mg tablet, Take 10 mg by mouth daily as needed, Disp: , Rfl:     losartan (COZAAR) 100 MG tablet, Take 1 tablet (100 mg total) by mouth daily, Disp: 90 tablet, Rfl: 1    Multiple Vitamins-Minerals (WOMENS MULTIVITAMIN PO), Take 1 tablet by mouth in the morning  , Disp: , Rfl:     Current Allergies     Allergies as of 02/17/2024 - Reviewed 02/17/2024   Allergen Reaction Noted    Codeine Rash 04/21/2014            The following portions of the patient's history were reviewed and updated as appropriate: allergies, current medications, past family history, past medical history, past social history, past surgical history and problem list.     Past Medical History:   Diagnosis Date    Class 2 severe obesity with serious comorbidity and body mass index (BMI) of 36.0 to 36.9 in adult      Fatty liver 03/2022    Hepatomegaly 03/2022    IFG (impaired fasting glucose) 02/05/2023    Mitral regurgitation 03/2022    Trace    OAB (overactive bladder)     Other hyperlipidemia 03/2022    Primary hypertension     Seasonal allergic rhinitis     Tricuspid regurgitation 03/2022    Vitamin D deficiency 03/2022       Past Surgical History:   Procedure Laterality Date    HYSTEROSCOPY W/ POLYPECTOMY      times 2/ 2016/ 2nd one aug 18/18; Endometrial Polyp    WISDOM TOOTH EXTRACTION         Family History   Problem Relation Age of Onset    Hypertension Mother     Arrhythmia Mother     Diabetes type II Mother     Prostate cancer Father 70    Hypertension Father     Rheum arthritis Father 66    Congenital Hearing Loss Father     Other Sister         pseudomyxoma peritonei    No Known Problems Sister     No Known Problems Sister     Parkinsonism Maternal  Grandmother     Valvular heart disease Maternal Grandfather     No Known Problems Paternal Grandmother     Heart attack Paternal Grandfather     Coronary artery disease Paternal Grandfather     No Known Problems Maternal Aunt     No Known Problems Maternal Aunt     No Known Problems Paternal Aunt     Breast cancer Family          2 paternal great aunts late in life / both are decsd    Alcohol abuse Neg Hx     Substance Abuse Neg Hx     Depression Neg Hx     Mental illness Neg Hx          Medications have been verified.        Objective   /67   Pulse 64   Temp 98.1 °F (36.7 °C)   Resp 18   LMP 09/24/2021 (Exact Date)   SpO2 99%        Physical Exam     Physical Exam  Vitals and nursing note reviewed.   Constitutional:       General: She is not in acute distress.     Appearance: Normal appearance. She is not ill-appearing, toxic-appearing or diaphoretic.      Interventions: She is not intubated.  HENT:      Head: Normocephalic and atraumatic.      Right Ear: Tympanic membrane, ear canal and external ear normal. There is no impacted cerumen.      Left Ear: Tympanic membrane, ear canal and external ear normal. There is no impacted cerumen.      Nose: Nose normal. No congestion or rhinorrhea.      Mouth/Throat:      Mouth: Mucous membranes are moist.      Pharynx: Oropharynx is clear. Uvula midline. No pharyngeal swelling, oropharyngeal exudate, posterior oropharyngeal erythema or uvula swelling.      Tonsils: No tonsillar exudate or tonsillar abscesses. 1+ on the right. 1+ on the left.   Eyes:      Extraocular Movements: Extraocular movements intact.      Conjunctiva/sclera: Conjunctivae normal.      Pupils: Pupils are equal, round, and reactive to light.   Cardiovascular:      Rate and Rhythm: Normal rate and regular rhythm.      Pulses: Normal pulses.      Heart sounds: Normal heart sounds, S1 normal and S2 normal. Heart sounds not distant. No murmur heard.     No friction rub. No gallop.   Pulmonary:       Effort: Pulmonary effort is normal. No tachypnea, bradypnea, accessory muscle usage, prolonged expiration, respiratory distress or retractions. She is not intubated.      Breath sounds: Normal breath sounds. No stridor, decreased air movement or transmitted upper airway sounds. No decreased breath sounds, wheezing, rhonchi or rales.   Abdominal:      General: Bowel sounds are normal.      Palpations: Abdomen is soft.      Tenderness: There is no abdominal tenderness. There is no guarding or rebound.   Musculoskeletal:         General: Normal range of motion.      Cervical back: Full passive range of motion without pain, normal range of motion and neck supple. No tenderness. No spinous process tenderness or muscular tenderness. Normal range of motion.   Lymphadenopathy:      Cervical: No cervical adenopathy.      Right cervical: No superficial cervical adenopathy.     Left cervical: No superficial cervical adenopathy.   Skin:     General: Skin is warm and dry.      Capillary Refill: Capillary refill takes less than 2 seconds.   Neurological:      General: No focal deficit present.      Mental Status: She is alert and oriented to person, place, and time.      Cranial Nerves: No cranial nerve deficit.   Psychiatric:         Mood and Affect: Mood normal.         Behavior: Behavior normal.

## 2024-02-17 NOTE — PATIENT INSTRUCTIONS
Please begin Coricidin cough/decongestant medication, discontinue Hanna-[place] cold and sinus.   Begin Flonase nasal spray as directed.   Stay well hydrated.   Follow up with PCP if no relief within one week.

## 2024-03-20 NOTE — PROGRESS NOTES
"Assessment/Plan:    Recommended monthly SBE, annual CBE and annual screening mammo. ASCCP guidelines reviewed and pap with cotesting noted to be up to date; this low risk patient was advised she meets criteria to d/c pap screening at age 63. Pap done. Cologuard noted to be up to date. Reviewed diet/activity recommendations Calcium 1200 mg and Vit D 600-1000 IU daily.  Discussed postmenopausal considerations and symptoms to report. Kegel exercises as instructed. RTO in one year for routine annual gyn exam or sooner PRN.      Diagnoses and all orders for this visit:    Encounter for gynecological examination (general) (routine) without abnormal findings    Screening mammogram for breast cancer  -     Mammo screening bilateral w 3d & cad; Future        Subjective:      Patient ID: Sarah Whitley is a 49 y.o. female.    This patient presents for routine annual gyn exam.   PM with last menses 9/2021. Resoved VM sx.    Has vaginal dryness, using lubrication with intercourse.   She denies pelvic pain, dyspareunia, breast concerns, bowel/bladder concerns, abnormal discharge, depression/anxiety.   Pap/HPV testing up to date and normal, 11/17/21. Mammo 4/11/23.   and monogamous. Cologuard neg 8/10/22.             The following portions of the patient's history were reviewed and updated as appropriate: allergies, current medications, past family history, past medical history, past social history, past surgical history and problem list.    Review of Systems   Constitutional: Negative.    Respiratory: Negative.     Cardiovascular: Negative.    Gastrointestinal: Negative.    Endocrine: Negative.    Genitourinary:  Negative for dyspareunia, dysuria, frequency, pelvic pain, urgency, vaginal bleeding, vaginal discharge and vaginal pain.   Musculoskeletal: Negative.    Skin: Negative.    Neurological: Negative.    Psychiatric/Behavioral: Negative.           Objective:      /70   Ht 5' 5\" (1.651 m)   Wt 86.6 kg (191 " lb)   LMP 09/24/2021 (Exact Date)   BMI 31.78 kg/m²          Physical Exam  Vitals and nursing note reviewed. Exam conducted with a chaperone present.   Constitutional:       Appearance: Normal appearance. She is well-developed.   HENT:      Head: Normocephalic and atraumatic.   Neck:      Thyroid: No thyroid mass or thyromegaly.   Cardiovascular:      Rate and Rhythm: Normal rate and regular rhythm.      Heart sounds: Murmur heard.   Pulmonary:      Effort: Pulmonary effort is normal.      Breath sounds: Normal breath sounds.   Chest:   Breasts:     Breasts are symmetrical.      Right: No inverted nipple, mass, nipple discharge, skin change or tenderness.      Left: No inverted nipple, mass, nipple discharge, skin change or tenderness.   Abdominal:      General: Bowel sounds are normal.      Palpations: Abdomen is soft.      Tenderness: There is no abdominal tenderness.      Hernia: There is no hernia in the left inguinal area or right inguinal area.   Genitourinary:     General: Normal vulva.      Exam position: Supine.      Pubic Area: No rash.       Labia:         Right: No rash, tenderness, lesion or injury.         Left: No rash, tenderness, lesion or injury.       Urethra: No prolapse, urethral pain, urethral swelling or urethral lesion.      Vagina: Normal. No signs of injury and foreign body. No vaginal discharge, erythema, tenderness, bleeding, lesions or prolapsed vaginal walls.      Cervix: No cervical motion tenderness, discharge, friability, lesion, erythema, cervical bleeding or eversion.      Uterus: Not deviated, not enlarged, not fixed, not tender and no uterine prolapse.       Adnexa:         Right: No mass, tenderness or fullness.          Left: No mass, tenderness or fullness.        Rectum: No external hemorrhoid.      Comments: Urethra normal without lesions  No bladder tenderness  Musculoskeletal:         General: Normal range of motion.      Cervical back: Normal range of motion and neck  supple.   Lymphadenopathy:      Lower Body: No right inguinal adenopathy. No left inguinal adenopathy.   Skin:     General: Skin is warm and dry.   Neurological:      Mental Status: She is alert and oriented to person, place, and time.   Psychiatric:         Speech: Speech normal.         Behavior: Behavior normal. Behavior is cooperative.

## 2024-03-26 ENCOUNTER — ANNUAL EXAM (OUTPATIENT)
Dept: GYNECOLOGY | Facility: CLINIC | Age: 49
End: 2024-03-26
Payer: COMMERCIAL

## 2024-03-26 VITALS
BODY MASS INDEX: 31.82 KG/M2 | WEIGHT: 191 LBS | HEIGHT: 65 IN | SYSTOLIC BLOOD PRESSURE: 121 MMHG | DIASTOLIC BLOOD PRESSURE: 70 MMHG

## 2024-03-26 DIAGNOSIS — Z12.4 ENCOUNTER FOR PAPANICOLAOU SMEAR FOR CERVICAL CANCER SCREENING: ICD-10-CM

## 2024-03-26 DIAGNOSIS — Z12.31 SCREENING MAMMOGRAM FOR BREAST CANCER: ICD-10-CM

## 2024-03-26 DIAGNOSIS — Z01.419 ENCOUNTER FOR GYNECOLOGICAL EXAMINATION (GENERAL) (ROUTINE) WITHOUT ABNORMAL FINDINGS: Primary | ICD-10-CM

## 2024-03-26 PROCEDURE — G0145 SCR C/V CYTO,THINLAYER,RESCR: HCPCS | Performed by: OBSTETRICS & GYNECOLOGY

## 2024-03-26 PROCEDURE — G0476 HPV COMBO ASSAY CA SCREEN: HCPCS | Performed by: OBSTETRICS & GYNECOLOGY

## 2024-03-26 PROCEDURE — 99396 PREV VISIT EST AGE 40-64: CPT | Performed by: OBSTETRICS & GYNECOLOGY

## 2024-04-02 LAB
LAB AP GYN PRIMARY INTERPRETATION: NORMAL
Lab: NORMAL

## 2024-04-05 ENCOUNTER — TELEPHONE (OUTPATIENT)
Dept: GYNECOLOGY | Facility: CLINIC | Age: 49
End: 2024-04-05

## 2024-04-05 NOTE — TELEPHONE ENCOUNTER
LM on pts. VM    ----- Message from AYSE Mayorga sent at 4/5/2024  8:08 AM EDT -----  Pls inform pt of normal pap and HPV

## 2024-04-24 ENCOUNTER — HOSPITAL ENCOUNTER (OUTPATIENT)
Dept: RADIOLOGY | Facility: IMAGING CENTER | Age: 49
Discharge: HOME | End: 2024-04-24
Payer: COMMERCIAL

## 2024-04-24 VITALS — WEIGHT: 191 LBS | BODY MASS INDEX: 31.82 KG/M2 | HEIGHT: 65 IN

## 2024-04-24 DIAGNOSIS — Z12.31 SCREENING MAMMOGRAM FOR BREAST CANCER: ICD-10-CM

## 2024-04-24 PROCEDURE — 77067 SCR MAMMO BI INCL CAD: CPT

## 2024-04-24 PROCEDURE — 77063 BREAST TOMOSYNTHESIS BI: CPT

## 2024-06-16 LAB
25(OH)D3 SERPL-MCNC: 52 NG/ML (ref 30–100)
ALBUMIN SERPL-MCNC: 4.3 G/DL (ref 3.6–5.1)
ALBUMIN/GLOB SERPL: 1.8 (CALC) (ref 1–2.5)
ALP SERPL-CCNC: 67 U/L (ref 37–153)
ALT SERPL-CCNC: 13 U/L (ref 6–29)
AST SERPL-CCNC: 17 U/L (ref 10–35)
BASOPHILS # BLD AUTO: 31 CELLS/UL (ref 0–200)
BASOPHILS NFR BLD AUTO: 0.5 %
BILIRUB SERPL-MCNC: 0.6 MG/DL (ref 0.2–1.2)
BUN SERPL-MCNC: 19 MG/DL (ref 7–25)
BUN/CREAT SERPL: ABNORMAL (CALC) (ref 6–22)
CALCIUM SERPL-MCNC: 9.4 MG/DL (ref 8.6–10.4)
CHLORIDE SERPL-SCNC: 104 MMOL/L (ref 98–110)
CHOLEST SERPL-MCNC: 248 MG/DL
CHOLEST/HDLC SERPL: 3.6 (CALC)
CO2 SERPL-SCNC: 28 MMOL/L (ref 20–32)
CREAT SERPL-MCNC: 0.73 MG/DL (ref 0.5–1.03)
EOSINOPHIL # BLD AUTO: 153 CELLS/UL (ref 15–500)
EOSINOPHIL NFR BLD AUTO: 2.5 %
ERYTHROCYTE [DISTWIDTH] IN BLOOD BY AUTOMATED COUNT: 12.2 % (ref 11–15)
GFR/BSA.PRED SERPLBLD CYS-BASED-ARV: 100 ML/MIN/1.73M2
GLOBULIN SER CALC-MCNC: 2.4 G/DL (CALC) (ref 1.9–3.7)
GLUCOSE SERPL-MCNC: 103 MG/DL (ref 65–99)
HBA1C MFR BLD: 5.6 % OF TOTAL HGB
HCT VFR BLD AUTO: 34.3 % (ref 35–45)
HDLC SERPL-MCNC: 68 MG/DL
HGB BLD-MCNC: 11.7 G/DL (ref 11.7–15.5)
LDLC SERPL CALC-MCNC: 166 MG/DL (CALC)
LDLC SERPL DIRECT ASSAY-MCNC: 163 MG/DL
LYMPHOCYTES # BLD AUTO: 1940 CELLS/UL (ref 850–3900)
LYMPHOCYTES NFR BLD AUTO: 31.8 %
MAGNESIUM SERPL-MCNC: 1.9 MG/DL (ref 1.5–2.5)
MCH RBC QN AUTO: 30.7 PG (ref 27–33)
MCHC RBC AUTO-ENTMCNC: 34.1 G/DL (ref 32–36)
MCV RBC AUTO: 90 FL (ref 80–100)
MONOCYTES # BLD AUTO: 482 CELLS/UL (ref 200–950)
MONOCYTES NFR BLD AUTO: 7.9 %
NEUTROPHILS # BLD AUTO: 3495 CELLS/UL (ref 1500–7800)
NEUTROPHILS NFR BLD AUTO: 57.3 %
NONHDLC SERPL-MCNC: 180 MG/DL (CALC)
PLATELET # BLD AUTO: 298 THOUSAND/UL (ref 140–400)
PMV BLD REES-ECKER: 9.8 FL (ref 7.5–12.5)
POTASSIUM SERPL-SCNC: 4.3 MMOL/L (ref 3.5–5.3)
PROT SERPL-MCNC: 6.7 G/DL (ref 6.1–8.1)
RBC # BLD AUTO: 3.81 MILLION/UL (ref 3.8–5.1)
SODIUM SERPL-SCNC: 139 MMOL/L (ref 135–146)
TRIGL SERPL-MCNC: 50 MG/DL
TSH SERPL-ACNC: 1.34 MIU/L
WBC # BLD AUTO: 6.1 THOUSAND/UL (ref 3.8–10.8)

## 2024-06-24 NOTE — PROGRESS NOTES
Assessment/Plan:  Problem List Items Addressed This Visit          Cardiovascular and Mediastinum    Primary hypertension     Stable.  Continue Chlorthalidone 25mg QD and Losartan 100mg QD.  Check blood pressure outside of office.  Recommend lifestyle modifications.           Relevant Medications    chlorthalidone 25 mg tablet    losartan (COZAAR) 100 MG tablet    Other Relevant Orders    Magnesium    Mitral regurgitation     Stable.  Management per Cardio - Overdue for appt.           Tricuspid regurgitation     Stable.  Management per Cardio - Overdue for appt.              Respiratory    Seasonal allergic rhinitis     Stable on Claritin 10 mg daily p.r.n..  Avoid decongestants.            Digestive    Hepatomegaly     Management per GI.  F/U PRN as W/U negative.  Recommend lifestyle modifications.           Fatty liver     Management per GI.  F/U PRN as W/U negative.  Recommend lifestyle modifications.              Endocrine    IFG (impaired fasting glucose)     Stable.  Patient declines Metformin XR, GLP-1 agonist, and Pre-DM education.  Recommend lifestyle modifications.             Relevant Orders    Comprehensive metabolic panel    Hemoglobin A1C       Genitourinary    OAB (overactive bladder)     Stable without meds.  Management per Gyn.              Other    Class 1 obesity with serious comorbidity and body mass index (BMI) of 32.0 to 32.9 in adult     Stable.  Recommend lifestyle modifications.  Patient declines weight management and GLP-1 agonist.          Relevant Orders    TSH, 3rd generation with Free T4 reflex    Other hyperlipidemia     Stable s statin.  Recommend lifestyle modifications.    The 10-year ASCVD risk score (Clifton KAUR, et al., 2019) is: 1.7%    Values used to calculate the score:      Age: 49 years      Sex: Female      Is Non- : No      Diabetic: No      Tobacco smoker: No      Systolic Blood Pressure: 122 mmHg      Is BP treated: Yes      HDL Cholesterol: 68  mg/dL      Total Cholesterol: 248 mg/dL           Relevant Orders    CBC and differential    Comprehensive metabolic panel    Lipid panel    TSH, 3rd generation with Free T4 reflex    LDL cholesterol, direct    Vitamin D deficiency     Stable.  Continue MVI and OTC Vitamin D 2,000IU daily.           Relevant Orders    Comprehensive metabolic panel    Vitamin D 25 hydroxy     Other Visit Diagnoses       Annual physical exam    -  Primary    Health Maintenance   Topic Date Due   • Influenza Vaccine (Season Ended) 09/01/2024   • Zoster Vaccine (1 of 2) 12/25/2024 (Originally 1/17/2023)   • Pneumococcal Vaccine: Pediatrics (0 to 5 Years) and At-Risk Patients (6 to 64 Years) (1 of 2 - PCV) 12/25/2024 (Originally 1/17/1979)   • DTaP,Tdap,and Td Vaccines (1 - Tdap) 12/25/2024 (Originally 1/17/1994)   • COVID-19 Vaccine (3 - 2023-24 season) 06/25/2025 (Originally 9/1/2023)   • Breast Cancer Screening: Mammogram  04/24/2025   • Depression Screening  06/25/2025   • Annual Physical  06/25/2025   • Colorectal Cancer Screening  08/10/2025   • Cervical Cancer Screening  03/26/2027   • RSV Vaccine Age 60+ Years (1 - 1-dose 60+ series) 01/17/2033   • HIV Screening  Completed   • Hepatitis C Screening  Completed   • RSV Vaccine age 0-20 Months  Aged Out   • HIB Vaccine  Aged Out   • IPV Vaccine  Aged Out   • Hepatitis A Vaccine  Aged Out   • Meningococcal ACWY Vaccine  Aged Out   • HPV Vaccine  Aged Out         Obesity (BMI 30.0-34.9)        BMI 32.0-32.9,adult                     Return in about 6 months (around 12/25/2024) for 6mo - IFG, HTN, HL, Vit D Def, Labs; PRN Shingrix #1, 2mo later Shingrix #2.      Future Appointments   Date Time Provider Department Center   1/7/2025  8:00 AM Azalia Finn DO [facility]          Subjective:     Sarah is a 49 y.o. female who presents today for a follow-up on her chronic medical conditions.        HPI:  Chief Complaint   Patient presents with   • Physical Exam     Pt has no  concerns   • Results     labs     -- Above per clinical staff and reviewed. --      HPI      Today:      Return in about 6 months (around 6/21/2024) for Physical / 6mo - IFG, HTN, HL, Vit D Def, Labs; PRN Shingrix #1, 2mo later Shingrix #2.     6mo OV         Obesity - Watching diet.  She not adding salt to food.  Does intermittent fasting during the work week.  +Exercise - Gym -  / Weights / Cardio / Class for 1 hour, 6 times per week.     IFG - No Metformin XR previously.         HTN -  Management per Cardio Dr. He Mckeon.  Next appt 1/24 - Overdue.  On Losartan 100mg QD and Chlorthalidone 25mg QD.  BP check outside of office on arm cuff 128/68.     Hyperlipidemia - No statin previously.       Vitamin D Deficiency - s/p Drisdol.  Taking MVI, and OTC Vitamin D 2,000IU daily.        RUQ Abdominal Pain / Fatty Liver - Management per GI Dr. Aureliano Mckeon.  Next appt PRN.  s/p RUQ U/S on 3/9/22 - stable gallbladder. Negative NAVA Fibrosure and abdominal ultrasound elastography with fat quantification (UGAP) - no hepatic fibrosis.  Referred to Weight Management.  She defers as she is working  with the  / nutritionist at the gym, which is more affordable.  Work-up negative for fibrosis.       No Left Atrial Dilation (Measurement Error per Cardio) / Mitral Regurgitation / Tricuspid Regurgitation - Management per Cardio Dr. He Mckeon.  Next appt 1/24 - Overdue.  Pending repeat Echo when HTN is controlled.        OAB - Management per Gyn AYSE Wynne.  Next appt 3/25.  No meds.       AR - Stable on Claritin PRN.            Reviewed:  Labs 6/15/24, Gyn 3/26/24, GI 8/4/22, Cardio 1/10/23     Sees Gyn AYSE Wynne at [facility] yearly.  Next appt 3/25.      Declines Shingrix, Tdap 6/20/23.    Sees Dentist q6 months.  Sees Optho q2 years.      PHQ-2/9 Depression Screening    Little interest or pleasure in doing things: 0 - not at all  Feeling  down, depressed, or hopeless: 0 - not at all  Trouble falling or staying asleep, or sleeping too much: 0 - not at all  Feeling tired or having little energy: 0 - not at all  Poor appetite or overeatin - not at all  Feeling bad about yourself - or that you are a failure or have let yourself or your family down: 0 - not at all  Trouble concentrating on things, such as reading the newspaper or watching television: 0 - not at all  Moving or speaking so slowly that other people could have noticed. Or the opposite - being so fidgety or restless that you have been moving around a lot more than usual: 0 - not at all  Thoughts that you would be better off dead, or of hurting yourself in some way: 0 - not at all  PHQ-2 Score: 0  PHQ-2 Interpretation: Negative depression screen  PHQ-9 Score: 0  PHQ-9 Interpretation: No or Minimal depression           The following portions of the patient's history were reviewed and updated as appropriate: allergies, current medications, past family history, past medical history, past social history, past surgical history and problem list.      Review of Systems   Constitutional:  Negative for appetite change, chills, diaphoresis, fatigue and fever.   Respiratory:  Negative for chest tightness and shortness of breath.    Cardiovascular:  Negative for chest pain.   Gastrointestinal:  Negative for abdominal pain, blood in stool, diarrhea, nausea and vomiting.   Genitourinary:  Negative for dysuria.        Current Outpatient Medications   Medication Sig Dispense Refill   • chlorthalidone 25 mg tablet Take 1 tablet (25 mg total) by mouth daily 90 tablet 1   • GLUCOSAMINE-VITAMIN D PO Take 2 tablets by mouth in the morning Vitamin D 2000IU in 2 tabs     • loratadine (CLARITIN) 10 mg tablet Take 10 mg by mouth daily as needed     • losartan (COZAAR) 100 MG tablet Take 1 tablet (100 mg total) by mouth daily 90 tablet 1   • Multiple Vitamins-Minerals (WOMENS MULTIVITAMIN PO) Take 1 tablet by mouth  "in the morning         No current facility-administered medications for this visit.       Objective:  /68   Pulse (!) 54   Temp (!) 97.3 °F (36.3 °C) (Temporal)   Resp 18   Ht 5' 5\" (1.651 m)   Wt 87.5 kg (193 lb)   LMP 09/24/2021 (Exact Date)   SpO2 97%   BMI 32.12 kg/m²    Wt Readings from Last 3 Encounters:   06/25/24 87.5 kg (193 lb)   04/24/24 86.6 kg (191 lb)   03/26/24 86.6 kg (191 lb)      BP Readings from Last 3 Encounters:   06/25/24 122/68   03/26/24 121/70   02/17/24 145/67          Physical Exam  Vitals and nursing note reviewed.   Constitutional:       Appearance: Normal appearance. She is well-developed. She is obese.   HENT:      Head: Normocephalic and atraumatic.      Right Ear: Tympanic membrane, ear canal and external ear normal.      Left Ear: Tympanic membrane, ear canal and external ear normal.      Nose: Nose normal.      Right Sinus: No maxillary sinus tenderness or frontal sinus tenderness.      Left Sinus: No maxillary sinus tenderness or frontal sinus tenderness.      Mouth/Throat:      Mouth: Mucous membranes are moist.      Pharynx: Oropharynx is clear. Uvula midline.      Tonsils: No tonsillar exudate.   Eyes:      Extraocular Movements: Extraocular movements intact.      Conjunctiva/sclera: Conjunctivae normal.      Pupils: Pupils are equal, round, and reactive to light.   Cardiovascular:      Rate and Rhythm: Normal rate and regular rhythm.      Pulses: Normal pulses.      Heart sounds: Normal heart sounds.   Pulmonary:      Effort: Pulmonary effort is normal.      Breath sounds: Normal breath sounds.   Abdominal:      General: Bowel sounds are normal. There is no distension.      Palpations: Abdomen is soft. There is no mass.      Tenderness: There is no abdominal tenderness. There is no guarding or rebound.   Musculoskeletal:         General: No swelling or tenderness.      Cervical back: Neck supple.      Right lower leg: No edema.      Left lower leg: No edema. " "  Lymphadenopathy:      Cervical: No cervical adenopathy.   Skin:     Findings: No rash.   Neurological:      General: No focal deficit present.      Mental Status: She is alert and oriented to person, place, and time.   Psychiatric:         Mood and Affect: Mood normal.         Behavior: Behavior normal.         Thought Content: Thought content normal.         Judgment: Judgment normal.         Lab Results:      Lab Results   Component Value Date    WBC 6.1 06/15/2024    HGB 11.7 06/15/2024    HCT 34.3 (L) 06/15/2024     06/15/2024    TRIG 50 06/15/2024    HDL 68 06/15/2024    LDLDIRECT 163 (H) 06/15/2024    ALT 13 06/15/2024    AST 17 06/15/2024    K 4.3 06/15/2024     06/15/2024    CREATININE 0.73 06/15/2024    BUN 19 06/15/2024    CO2 28 06/15/2024    GLUF 119 (H) 07/30/2022    HGBA1C 5.6 06/15/2024     No results found for: \"URICACID\"  Invalid input(s): \"BASENAME\" Vitamin D    No results found.     POCT Labs        Depression Screening and Follow-up Plan: Patient was screened for depression during today's encounter. They screened negative with a PHQ-2 score of 0.                    "

## 2024-06-25 ENCOUNTER — OFFICE VISIT (OUTPATIENT)
Dept: FAMILY MEDICINE CLINIC | Facility: CLINIC | Age: 49
End: 2024-06-25
Payer: COMMERCIAL

## 2024-06-25 VITALS
RESPIRATION RATE: 18 BRPM | DIASTOLIC BLOOD PRESSURE: 68 MMHG | WEIGHT: 193 LBS | SYSTOLIC BLOOD PRESSURE: 122 MMHG | BODY MASS INDEX: 32.15 KG/M2 | OXYGEN SATURATION: 97 % | HEART RATE: 54 BPM | HEIGHT: 65 IN | TEMPERATURE: 97.3 F

## 2024-06-25 DIAGNOSIS — R73.01 IFG (IMPAIRED FASTING GLUCOSE): ICD-10-CM

## 2024-06-25 DIAGNOSIS — E55.9 VITAMIN D DEFICIENCY: ICD-10-CM

## 2024-06-25 DIAGNOSIS — E66.9 OBESITY (BMI 30.0-34.9): ICD-10-CM

## 2024-06-25 DIAGNOSIS — I07.1 TRICUSPID VALVE INSUFFICIENCY, UNSPECIFIED ETIOLOGY: ICD-10-CM

## 2024-06-25 DIAGNOSIS — E66.9 CLASS 1 OBESITY WITH SERIOUS COMORBIDITY AND BODY MASS INDEX (BMI) OF 32.0 TO 32.9 IN ADULT, UNSPECIFIED OBESITY TYPE: ICD-10-CM

## 2024-06-25 DIAGNOSIS — I34.0 MITRAL VALVE INSUFFICIENCY, UNSPECIFIED ETIOLOGY: ICD-10-CM

## 2024-06-25 DIAGNOSIS — R16.0 HEPATOMEGALY: ICD-10-CM

## 2024-06-25 DIAGNOSIS — I10 PRIMARY HYPERTENSION: ICD-10-CM

## 2024-06-25 DIAGNOSIS — N32.81 OAB (OVERACTIVE BLADDER): ICD-10-CM

## 2024-06-25 DIAGNOSIS — J30.2 SEASONAL ALLERGIC RHINITIS, UNSPECIFIED TRIGGER: ICD-10-CM

## 2024-06-25 DIAGNOSIS — K76.0 FATTY LIVER: ICD-10-CM

## 2024-06-25 DIAGNOSIS — Z00.00 ANNUAL PHYSICAL EXAM: Primary | ICD-10-CM

## 2024-06-25 DIAGNOSIS — E78.49 OTHER HYPERLIPIDEMIA: ICD-10-CM

## 2024-06-25 PROCEDURE — 99396 PREV VISIT EST AGE 40-64: CPT | Performed by: FAMILY MEDICINE

## 2024-06-25 RX ORDER — LOSARTAN POTASSIUM 100 MG/1
100 TABLET ORAL DAILY
Qty: 90 TABLET | Refills: 1 | Status: SHIPPED | OUTPATIENT
Start: 2024-06-25

## 2024-06-25 RX ORDER — CHLORTHALIDONE 25 MG/1
25 TABLET ORAL DAILY
Qty: 90 TABLET | Refills: 1 | Status: SHIPPED | OUTPATIENT
Start: 2024-06-25

## 2024-06-25 NOTE — PATIENT INSTRUCTIONS
To Do:     Call for appt - Cardio Dr. He Mckeon.  Next appt 1/24 - Overdue.   Wellness Visit for Adults   AMBULATORY CARE:   A wellness visit  is when you see your healthcare provider to get screened for health problems. Your healthcare provider will also give you advice on how to stay healthy. Write down your questions so you remember to ask them. Ask your healthcare provider how often you should have a wellness visit.  What happens at a wellness visit:  Your healthcare provider will ask about your health, and your family history of health problems. This includes high blood pressure, heart disease, and cancer. He or she will ask if you have symptoms that concern you, if you smoke, and about your mood. You may also be asked about your intake of medicines, supplements, food, and alcohol. Any of the following may be done:  Your weight  will be checked. Your height may also be checked so your body mass index (BMI) can be calculated. Your BMI shows if you are at a healthy weight.    Your blood pressure  and heart rate will be checked. Your temperature may also be checked.    Blood and urine tests  may be done. Blood tests may be done to check your cholesterol levels. Abnormal cholesterol levels increase your risk for heart disease and stroke. You may also need a blood or urine test to check for diabetes if you are at increased risk. Urine tests may be done to look for signs of an infection or kidney disease.    A physical exam  includes checking your heartbeat and lungs with a stethoscope. Your healthcare provider may also check your skin to look for sun damage.    Screening tests  may be recommended. A screening test is done to check for diseases that may not cause symptoms. The screening tests you may need depend on your age, gender, family history, and lifestyle habits. For example, colorectal screening may be recommended if you are 50 years old or older.    Screening tests you need if you are a woman:   A Pap  smear  is used to screen for cervical cancer. Pap smears are usually done every 3 to 5 years depending on your age. You may need them more often if you have had abnormal Pap smear test results in the past. Ask your healthcare provider how often you should have a Pap smear.    A mammogram  is an x-ray of your breasts to screen for breast cancer. Experts recommend mammograms every 2 years starting at age 50 years. You may need a mammogram at age 49 years or younger if you have an increased risk for breast cancer. Talk to your healthcare provider about when you should start having mammograms and how often you need them.    Vaccines you may need:   Get an influenza vaccine  every year. The influenza vaccine protects you from the flu. Several types of viruses cause the flu. The viruses change over time, so new vaccines are made each year.    Get a tetanus-diphtheria (Td) booster vaccine  every 10 years. This vaccine protects you against tetanus and diphtheria. Tetanus is a severe infection that may cause painful muscle spasms and lockjaw. Diphtheria is a severe bacterial infection that causes a thick covering in the back of your mouth and throat.    Get a human papillomavirus (HPV) vaccine  if you are female and aged 19 to 26 or male 19 to 21 and never received it. This vaccine protects you from HPV infection. HPV is the most common infection spread by sexual contact. HPV may also cause vaginal, penile, and anal cancers.    Get a pneumococcal vaccine  if you are aged 65 years or older. The pneumococcal vaccine is an injection given to protect you from pneumococcal disease. Pneumococcal disease is an infection caused by pneumococcal bacteria. The infection may cause pneumonia, meningitis, or an ear infection.    Get a shingles vaccine  if you are 60 or older, even if you have had shingles before. The shingles vaccine is an injection to protect you from the varicella-zoster virus. This is the same virus that causes  chickenpox. Shingles is a painful rash that develops in people who had chickenpox or have been exposed to the virus.    How to eat healthy:  My Plate is a model for planning healthy meals. It shows the types and amounts of foods that should go on your plate. Fruits and vegetables make up about half of your plate, and grains and protein make up the other half. A serving of dairy is included on the side of your plate. The amount of calories and serving sizes you need depends on your age, gender, weight, and height. Examples of healthy foods are listed below:  Eat a variety of vegetables  such as dark green, red, and orange vegetables. You can also include canned vegetables low in sodium (salt) and frozen vegetables without added butter or sauces.    Eat a variety of fresh fruits , canned fruit in 100% juice, frozen fruit, and dried fruit.    Include whole grains.  At least half of the grains you eat should be whole grains. Examples include whole-wheat bread, wheat pasta, brown rice, and whole-grain cereals such as oatmeal.    Eat a variety of protein foods such as seafood (fish and shellfish), lean meat, and poultry without skin (turkey and chicken). Examples of lean meats include pork leg, shoulder, or tenderloin, and beef round, sirloin, tenderloin, and extra lean ground beef. Other protein foods include eggs and egg substitutes, beans, peas, soy products, nuts, and seeds.    Choose low-fat dairy products such as skim or 1% milk or low-fat yogurt, cheese, and cottage cheese.    Limit unhealthy fats  such as butter, hard margarine, and shortening.       Exercise:  Exercise at least 30 minutes per day on most days of the week. Some examples of exercise include walking, biking, dancing, and swimming. You can also fit in more physical activity by taking the stairs instead of the elevator or parking farther away from stores. Include muscle strengthening activities 2 days each week. Regular exercise provides many health  benefits. It helps you manage your weight, and decreases your risk for type 2 diabetes, heart disease, stroke, and high blood pressure. Exercise can also help improve your mood. Ask your healthcare provider about the best exercise plan for you.       General health and safety guidelines:   Do not smoke.  Nicotine and other chemicals in cigarettes and cigars can cause lung damage. Ask your healthcare provider for information if you currently smoke and need help to quit. E-cigarettes or smokeless tobacco still contain nicotine. Talk to your healthcare provider before you use these products.    Limit alcohol.  A drink of alcohol is 12 ounces of beer, 5 ounces of wine, or 1½ ounces of liquor.    Lose weight, if needed.  Being overweight increases your risk of certain health conditions. These include heart disease, high blood pressure, type 2 diabetes, and certain types of cancer.    Protect your skin.  Do not sunbathe or use tanning beds. Use sunscreen with a SPF 15 or higher. Apply sunscreen at least 15 minutes before you go outside. Reapply sunscreen every 2 hours. Wear protective clothing, hats, and sunglasses when you are outside.    Drive safely.  Always wear your seatbelt. Make sure everyone in your car wears a seatbelt. A seatbelt can save your life if you are in an accident. Do not use your cell phone when you are driving. This could distract you and cause an accident. Pull over if you need to make a call or send a text message.    Practice safe sex.  Use latex condoms if are sexually active and have more than one partner. Your healthcare provider may recommend screening tests for sexually transmitted infections (STIs).    Wear helmets, lifejackets, and protective gear.  Always wear a helmet when you ride a bike or motorcycle, go skiing, or play sports that could cause a head injury. Wear protective equipment when you play sports. Wear a lifejacket when you are on a boat or doing water sports.    © Copyright  [employer] 2023 Information is for End User's use only and may not be sold, redistributed or otherwise used for commercial purposes.  The above information is an  only. It is not intended as medical advice for individual conditions or treatments. Talk to your doctor, nurse or pharmacist before following any medical regimen to see if it is safe and effective for you.    Obesity   AMBULATORY CARE:   Obesity  means your body mass index (BMI) is greater than 30. Your healthcare provider will use your age, height, and weight to measure your BMI.  The risks of obesity include  many health problems, including injuries or physical disability.  Diabetes (high blood sugar level)    High blood pressure or high cholesterol    Heart disease or heart failure    Stroke    Gallbladder or liver disease    Cancer of the colon, breast, prostate, liver, or kidney    Sleep apnea    Arthritis or gout    Screening  is done to check for health conditions before you have signs or symptoms. If you are 35 to 70 years old, your blood sugar level may be checked every 3 years for signs of prediabetes or diabetes. Your healthcare provider will check your blood pressure at each visit. High blood pressure can lead to a stroke or other problems. Your provider may check for signs of heart disease, cancer, or other health problems.  Seek care immediately if:   You have a severe headache, confusion, or difficulty speaking.    You have weakness on one side of your body.    You have chest pain, sweating, or shortness of breath.    Call your doctor if:   You have symptoms of gallbladder or liver disease, such as pain in your upper abdomen.    You have knee or hip pain and discomfort while walking.    You have symptoms of diabetes, such as intense hunger and thirst, and frequent urination.    You have symptoms of sleep apnea, such as snoring or daytime sleepiness.    You have questions or concerns about your condition or care.    Treatment for  obesity  focuses on helping you lose weight to improve your health. Even a small decrease in BMI can reduce the risk for many health problems. Your healthcare provider will help you set a weight-loss goal.  Lifestyle changes  are the first step in treating obesity. These include making healthy food choices and getting regular physical activity. Your healthcare provider may suggest a weight-loss program that involves coaching, education, and therapy.    Medicine  may help you lose weight when it is used with a healthy foods and physical activity.    Surgery  can help you lose weight if you have obesity along with other health problems. Several types of weight-loss surgery are available. Ask your healthcare provider for more information.    Tips for safe weight loss:   Set small, realistic goals.  An example of a small goal is to walk for 20 minutes 5 days a week. Anther goal is to lose 5% of your body weight.    Ask for support.  Tell friends, family members, and coworkers about your goals. Ask someone to lose weight with you. You may also want to join a weight-loss support group.    Identify foods or triggers that may cause you to overeat.  Remove tempting high-calorie foods from your home and workplace. Place a bowl of fresh fruit on your kitchen counter. If stress causes you to eat, find other ways to cope with stress. A counselor or therapist may be able to help you.    Track your daily calories and activity.  Write down what you eat and drink. Also write down how many minutes of physical activity you do each day.     Track your weekly weight.  Weigh yourself in the morning, before you eat or drink anything but after you use the bathroom. Use the same scale, in the same place, and in similar clothing each time. Only weigh yourself 1 to 2 times each week, or as directed. You may become discouraged if you weigh yourself every day.    Eating changes:  You will need to eat 500 to 1,000 fewer calories each day than  you currently eat to lose 1 to 2 pounds a week. The following changes will help you cut calories:  Eat smaller portions.  Use small plates, no larger than 9 inches in diameter. Fill your plate half full of fruits and vegetables. Measure your food using measuring cups until you know what a serving size looks like.         Eat 3 meals and 1 or 2 snacks each day.  Plan your meals in advance. Cook and eat at home most of the time. Eat slowly. Do not skip meals. Skipping meals can lead to overeating later in the day. This can make it harder for you to lose weight. Talk with a dietitian to help you make a meal plan and schedule that is right for you.    Eat fruits and vegetables at every meal.  They are low in calories and high in fiber, which makes you feel full. Do not add butter, margarine, or cream sauce to vegetables. Use herbs to season steamed vegetables.    Eat less fat and fewer fried foods.  Eat more baked or grilled chicken and fish. These protein sources are lower in calories and fat than red meat. Limit fast food. Dress your salads with olive oil and vinegar instead of bottled dressing.    Limit the amount of sugar you eat.  Do not drink sugary beverages. Limit alcohol.       Activity changes:  Physical activity is good for your body in many ways. It helps you burn calories and build strong muscles. It decreases stress and depression, and improves your mood. It can also help you sleep better. Talk to your healthcare provider before you begin an exercise program.  Exercise for at least 30 minutes 5 days a week.  Start slowly. Set aside time each day for physical activity that you enjoy and that is convenient for you. It is best to do both weight training and an activity that increases your heart rate, such as walking, bicycling, or swimming.            Find ways to be more active.  Do yard work and housecleaning. Walk up the stairs instead of using elevators. Spend your leisure time going to events that  require walking, such as outdoor festivals or fairs. This extra physical activity can help you lose weight and keep it off.       Follow up with your doctor as directed:  You may need to meet with a dietitian. Write down your questions so you remember to ask them during your visits.  © Copyright [employer] 2023 Information is for End User's use only and may not be sold, redistributed or otherwise used for commercial purposes.  The above information is an  only. It is not intended as medical advice for individual conditions or treatments. Talk to your doctor, nurse or pharmacist before following any medical regimen to see if it is safe and effective for you.    Cholesterol and Your Health   AMBULATORY CARE:   Cholesterol  is a waxy, fat-like substance. Your body uses cholesterol to make hormones and new cells, and to protect nerves. Cholesterol is made by your body. It also comes from certain foods you eat, such as meat and dairy products. Your healthcare provider can help you set goals for your cholesterol levels. Your provider can help you create a plan to meet your goals.  Cholesterol level goals:  Your cholesterol level goals depend on your risk for heart disease, your age, and your other health conditions. The following are general guidelines:  Total cholesterol  includes low-density lipoprotein (LDL), high-density lipoprotein (HDL), and triglyceride levels. The total cholesterol level should be lower than 200 mg/dL and is best at about 150 mg/dL.    LDL cholesterol  is called bad cholesterol  because it forms plaque in your arteries. As plaque builds up, your arteries become narrow, and less blood flows through. When plaque decreases blood flow to your heart, you may have chest pain. If plaque completely blocks an artery that brings blood to your heart, you may have a heart attack. Plaque can break off and form blood clots. Blood clots may block arteries in your brain and cause a stroke. The level  should be less than 130 mg/dL and is best at about 100 mg/dL.         HDL cholesterol  is called good cholesterol  because it helps remove LDL cholesterol from your arteries. It does this by attaching to LDL cholesterol and carrying it to your liver. Your liver breaks down LDL cholesterol so your body can get rid of it. High levels of HDL cholesterol can help prevent a heart attack and stroke. Low levels of HDL cholesterol can increase your risk for heart disease, heart attack, and stroke. The level should be at least 40 mg/dL in males or at least 50 mg/dL in females.    Triglycerides  are a type of fat that store energy from foods you eat. High levels of triglycerides also cause plaque buildup. This can increase your risk for a heart attack or stroke. If your triglyceride level is high, your LDL cholesterol level may also be high. The level should be less than 150 mg/dL.    Any of the following can increase your risk for high cholesterol:   Smoking or drinking large amounts of alcohol    Having overweight or obesity, or not getting enough exercise    A medical condition such as hypertension (high blood pressure) or diabetes    A family history of high cholesterol    Age older than 65    What you need to know about having your cholesterol levels checked:  Adults 20 to 45 years of age should have their cholesterol levels checked every 4 to 6 years. Adults 45 years or older should have their cholesterol checked every 1 to 2 years. You may need your cholesterol checked more often, or at a younger age, if you have risk factors for heart disease. You may also need to have your cholesterol checked more often if you have other health conditions, such as diabetes. Blood tests are used to check cholesterol levels. Blood tests measure your levels of triglycerides, LDL cholesterol, and HDL cholesterol.  How healthy fats affect your cholesterol levels:  Healthy fats, also called unsaturated fats, help lower LDL cholesterol and  triglyceride levels. Healthy fats include the following:  Monounsaturated fats  are found in foods such as olive oil, canola oil, avocado, nuts, and olives.    Polyunsaturated fats,  such as omega 3 fats, are found in fish, such as salmon, trout, and tuna. They can also be found in plant foods such as flaxseed, walnuts, and soybeans.    How unhealthy fats affect your cholesterol levels:  Unhealthy fats increase LDL cholesterol and triglyceride levels. They are found in foods high in cholesterol, saturated fat, and trans fat:  Cholesterol  is found in eggs, dairy, and meat.    Saturated fat  is found in butter, cheese, ice cream, whole milk, and coconut oil. Saturated fat is also found in meat, such as sausage, hot dogs, and bologna.    Trans fat  is found in liquid oils and is used in fried and baked foods. Foods that contain trans fats include chips, crackers, muffins, sweet rolls, microwave popcorn, and cookies.    Treatment  for high cholesterol will also decrease your risk of heart disease, heart attack, and stroke. Treatment may include any of the following:  Lifestyle changes  may include food, exercise, weight loss, and quitting smoking. You may also need to decrease the amount of alcohol you drink. Your healthcare provider will want you to start with lifestyle changes. Other treatment may be added if lifestyle changes are not enough. Your healthcare provider may recommend you work with a team to manage hyperlipidemia. The team may include medical experts such as a dietitian, an exercise or physical therapist, and a behavior therapist. Your family members may be included in helping you create lifestyle changes.    Medicines  may be given to lower your LDL cholesterol, triglyceride levels, or total cholesterol level. You may need medicines to lower your cholesterol if any of the following is true:    You have a history of stroke, TIA, unstable angina, or a heart attack.    Your LDL cholesterol level is 190  mg/dL or higher.    You are age 40 to 75 years, have diabetes or heart disease risk factors, and your LDL cholesterol is 70 mg/dL or higher.    Supplements  include fish oil, red yeast rice, and garlic. Fish oil may help lower your triglyceride and LDL cholesterol levels. It may also increase your HDL cholesterol level. Red yeast rice may help decrease your total cholesterol level and LDL cholesterol level. Garlic may help lower your total cholesterol level. Do not take any supplements without talking to your healthcare provider.    Food changes you can make to lower your cholesterol levels:  A dietitian can help you create a healthy eating plan. Your dietitian can show you how to read food labels and choose foods low in saturated fat, trans fats, and cholesterol.     Decrease the total amount of fat you eat.  Choose lean meats, fat-free or 1% fat milk, and low-fat dairy products, such as yogurt and cheese. Try to limit or avoid red meats. Limit or do not eat fried foods or baked goods, such as cookies.    Replace unhealthy fats with healthy fats.  Cook foods in olive oil or canola oil. Choose soft margarines that are low in saturated fat and trans fat. Seeds, nuts, and avocados are other examples of healthy fats.    Eat foods with omega-3 fats.  Examples include salmon, tuna, mackerel, walnuts, and flaxseed. Eat fish 2 times per week. Pregnant women should not eat fish that have high levels of mercury, such as shark, swordfish, and maria fernanda mackerel.         Increase the amount of high-fiber foods you eat.  High-fiber foods can help lower your LDL cholesterol. Aim to get between 20 and 30 grams of fiber each day. Fruits and vegetables are high in fiber. Eat at least 5 servings each day. Other high-fiber foods are whole-grain or whole-wheat breads, pastas, or cereals, and brown rice. Eat 3 ounces of whole-grain foods each day. Increase fiber slowly. You may have abdominal discomfort, bloating, and gas if you add fiber  to your diet too quickly.         Eat healthy protein foods.  Examples include low-fat dairy products, skinless chicken and turkey, fish, and nuts.    Limit foods and drinks that are high in sugar.  Your dietitian or healthcare provider can help you create daily limits for high-sugar foods and drinks. The limit may be lower if you have diabetes or another health condition. Limits can also help you lose weight if needed.  Lifestyle changes you can make to lower your cholesterol levels:   Maintain a healthy weight.  Ask your healthcare provider what a healthy weight is for you. Ask your provider to help you create a weight loss plan if needed. Weight loss can decrease your total cholesterol and triglyceride levels. Weight loss may also help keep your blood pressure at a healthy level.    Be physically active throughout the day.  Physical activity, such as exercise, can help lower your total cholesterol level and maintain a healthy weight. Physical activity can also help increase your HDL cholesterol level. Work with your healthcare provider to create an program that is right for you. Get at least 30 to 40 minutes of moderate physical activity most days of the week. Examples of exercise include brisk walking, swimming, or biking. Also include strength training at least 2 times each week. Your healthcare providers can help you create a physical activity plan.            Do not smoke.  Nicotine and other chemicals in cigarettes and cigars can raise your cholesterol levels. Ask your healthcare provider for information if you currently smoke and need help to quit. E-cigarettes or smokeless tobacco still contain nicotine. Talk to your healthcare provider before you use these products.         Limit or do not drink alcohol.  Alcohol can increase your triglyceride levels. Ask your healthcare provider before you drink alcohol. Ask how much is okay for you to drink in 24 hours or 1 week.    Follow up with your doctor as  directed:  Write down your questions so you remember to ask them during your visits.  © Copyright [employer] 2023 Information is for End User's use only and may not be sold, redistributed or otherwise used for commercial purposes.  The above information is an  only. It is not intended as medical advice for individual conditions or treatments. Talk to your doctor, nurse or pharmacist before following any medical regimen to see if it is safe and effective for you.

## 2024-06-25 NOTE — ASSESSMENT & PLAN NOTE
Stable s statin.  Recommend lifestyle modifications.    The 10-year ASCVD risk score (Clifton KAUR, et al., 2019) is: 1.7%    Values used to calculate the score:      Age: 49 years      Sex: Female      Is Non- : No      Diabetic: No      Tobacco smoker: No      Systolic Blood Pressure: 122 mmHg      Is BP treated: Yes      HDL Cholesterol: 68 mg/dL      Total Cholesterol: 248 mg/dL

## 2024-06-25 NOTE — ASSESSMENT & PLAN NOTE
Stable.  Recommend lifestyle modifications.  Patient declines weight management and GLP-1 agonist.

## 2024-12-29 ENCOUNTER — RESULTS FOLLOW-UP (OUTPATIENT)
Dept: FAMILY MEDICINE CLINIC | Facility: CLINIC | Age: 49
End: 2024-12-29

## 2024-12-29 DIAGNOSIS — D64.9 ANEMIA, UNSPECIFIED TYPE: Primary | ICD-10-CM

## 2024-12-29 NOTE — RESULT ENCOUNTER NOTE
Nurse to call lab to add iron studies, Vitamin B12, Folate to current specimen.  See orders.      ^^^^    Unstable labs - will review with patient at upcoming appointment.    Low RBC / Slight Anemia - Monitor.  See above.  Patient is post-menopausal.  Cologuard is up to date.      Hyperlipidemia- Recommend lifestyle modifications.

## 2024-12-30 LAB
25(OH)D3 SERPL-MCNC: 44 NG/ML (ref 30–100)
ALBUMIN SERPL-MCNC: 4.4 G/DL (ref 3.6–5.1)
ALBUMIN/GLOB SERPL: 1.9 (CALC) (ref 1–2.5)
ALP SERPL-CCNC: 66 U/L (ref 37–153)
ALT SERPL-CCNC: 17 U/L (ref 6–29)
AST SERPL-CCNC: 17 U/L (ref 10–35)
BASOPHILS # BLD AUTO: 30 CELLS/UL (ref 0–200)
BASOPHILS NFR BLD AUTO: 0.5 %
BILIRUB SERPL-MCNC: 0.7 MG/DL (ref 0.2–1.2)
BUN SERPL-MCNC: 16 MG/DL (ref 7–25)
BUN/CREAT SERPL: NORMAL (CALC) (ref 6–22)
CALCIUM SERPL-MCNC: 9.4 MG/DL (ref 8.6–10.4)
CHLORIDE SERPL-SCNC: 102 MMOL/L (ref 98–110)
CHOLEST SERPL-MCNC: 258 MG/DL
CHOLEST/HDLC SERPL: 3.2 (CALC)
CO2 SERPL-SCNC: 30 MMOL/L (ref 20–32)
CREAT SERPL-MCNC: 0.6 MG/DL (ref 0.5–1.03)
EOSINOPHIL # BLD AUTO: 183 CELLS/UL (ref 15–500)
EOSINOPHIL NFR BLD AUTO: 3.1 %
ERYTHROCYTE [DISTWIDTH] IN BLOOD BY AUTOMATED COUNT: 12.2 % (ref 11–15)
FERRITIN SERPL-MCNC: 110 NG/ML (ref 16–232)
FOLATE SERPL-MCNC: 23.8 NG/ML
GFR/BSA.PRED SERPLBLD CYS-BASED-ARV: 109 ML/MIN/1.73M2
GLOBULIN SER CALC-MCNC: 2.3 G/DL (CALC) (ref 1.9–3.7)
GLUCOSE SERPL-MCNC: 95 MG/DL (ref 65–99)
HBA1C MFR BLD: 5.5 % OF TOTAL HGB
HCT VFR BLD AUTO: 34 % (ref 35–45)
HDLC SERPL-MCNC: 81 MG/DL
HGB BLD-MCNC: 11.6 G/DL (ref 11.7–15.5)
IRON SATN MFR SERPL: 26 % (CALC) (ref 16–45)
IRON SERPL-MCNC: 102 MCG/DL (ref 45–160)
LDLC SERPL CALC-MCNC: 163 MG/DL (CALC)
LDLC SERPL DIRECT ASSAY-MCNC: 160 MG/DL
LYMPHOCYTES # BLD AUTO: 2030 CELLS/UL (ref 850–3900)
LYMPHOCYTES NFR BLD AUTO: 34.4 %
MAGNESIUM SERPL-MCNC: 1.9 MG/DL (ref 1.5–2.5)
MCH RBC QN AUTO: 31 PG (ref 27–33)
MCHC RBC AUTO-ENTMCNC: 34.1 G/DL (ref 32–36)
MCV RBC AUTO: 90.9 FL (ref 80–100)
MONOCYTES # BLD AUTO: 401 CELLS/UL (ref 200–950)
MONOCYTES NFR BLD AUTO: 6.8 %
NEUTROPHILS # BLD AUTO: 3257 CELLS/UL (ref 1500–7800)
NEUTROPHILS NFR BLD AUTO: 55.2 %
NONHDLC SERPL-MCNC: 177 MG/DL (CALC)
PLATELET # BLD AUTO: 296 THOUSAND/UL (ref 140–400)
PMV BLD REES-ECKER: 9.8 FL (ref 7.5–12.5)
POTASSIUM SERPL-SCNC: 4.2 MMOL/L (ref 3.5–5.3)
PROT SERPL-MCNC: 6.7 G/DL (ref 6.1–8.1)
RBC # BLD AUTO: 3.74 MILLION/UL (ref 3.8–5.1)
REF LAB TEST NAME: NORMAL
REF LAB TEST: NORMAL
SL AMB CLIENT CONTACT: NORMAL
SODIUM SERPL-SCNC: 139 MMOL/L (ref 135–146)
TIBC SERPL-MCNC: 386 MCG/DL (CALC) (ref 250–450)
TRIGL SERPL-MCNC: 47 MG/DL
TSH SERPL-ACNC: 1.38 MIU/L
VIT B12 SERPL-MCNC: 772 PG/ML (ref 200–1100)
WBC # BLD AUTO: 5.9 THOUSAND/UL (ref 3.8–10.8)

## 2025-01-06 NOTE — PROGRESS NOTES
Assessment/Plan:  Assessment & Plan  IFG (impaired fasting glucose)  Stable.  Patient declines Metformin XR, GLP-1 agonist, and Pre-DM education.  Recommend lifestyle modifications.             Orders:  •  Comprehensive metabolic panel; Future  •  Hemoglobin A1C; Future    Primary hypertension    Elevated BP today in office, with stable BP readings at home.  Continue Chlorthalidone 25mg QD and Losartan 100mg QD.  Check blood pressure outside of office.  Recommend lifestyle modifications.     Orders:  •  CBC and differential; Future  •  Comprehensive metabolic panel; Future  •  Magnesium; Future  •  chlorthalidone 25 mg tablet; Take 1 tablet (25 mg total) by mouth daily  •  losartan (COZAAR) 100 MG tablet; Take 1 tablet (100 mg total) by mouth daily    Other hyperlipidemia      Stable s statin.  Recommend lifestyle modifications.     The 10-year ASCVD risk score (Clifton KAUR, et al., 2019) is: 2.1%    Values used to calculate the score:      Age: 49 years      Sex: Female      Is Non- : No      Diabetic: No      Tobacco smoker: No      Systolic Blood Pressure: 146 mmHg      Is BP treated: Yes      HDL Cholesterol: 81 mg/dL      Total Cholesterol: 258 mg/dL      Orders:  •  CBC and differential; Future  •  Comprehensive metabolic panel; Future  •  Lipid panel; Future  •  TSH, 3rd generation with Free T4 reflex; Future  •  LDL cholesterol, direct; Future    Seasonal allergic rhinitis, unspecified trigger    Stable on Claritin 10 mg daily p.r.n..  Avoid decongestants.         Vitamin D deficiency    Stable. Continue MVI and OTC Vitamin D 2,000IU daily.     Orders:  •  Hemoglobin A1C; Future  •  Vitamin D 25 hydroxy; Future    Class 1 obesity with serious comorbidity and body mass index (BMI) of 31.0 to 31.9 in adult, unspecified obesity type    Stable.  Recommend lifestyle modifications.  Patient declines weight management and GLP-1 agonist.     Orders:  •  Hemoglobin A1C; Future    OAB (overactive  bladder)    Stable without meds.  Management per Gyn.              Hepatomegaly    Management per GI. F/U PRN as W/U negative. Recommend lifestyle modifications.          Fatty liver    Management per GI. F/U PRN as W/U negative. Recommend lifestyle modifications.          Mitral valve insufficiency, unspecified etiology    Stable. Management per Cardio - Overdue for appt.          Tricuspid valve insufficiency, unspecified etiology    Stable. Management per Cardio - Overdue for appt.          Screening for diabetes mellitus    Orders:  •  Hemoglobin A1C; Future    Screening for cardiovascular condition    Orders:  •  CBC and differential; Future  •  Comprehensive metabolic panel; Future  •  Lipid panel; Future  •  LDL cholesterol, direct; Future    Normocytic anemia    Mild.  Stable iron studies, Vitamin B12, Folate.  Colorectal cancer screening is up to date.  Paient is postmenopausal s vaginal bleeding.  Continue MVI.  Monitor.                 Return in about 6 months (around 7/7/2025) for Physical / 6mo - IFG, HTN, HL, Vit D Def, Labs; PRN Shingrix #1, 2mo later Shingrix #2.      Future Appointments   Date Time Provider Department Center   7/9/2025  8:00 AM Azalia Finn,  [facility]          Subjective:     Sarah is a 49 y.o. female who presents today for a follow-up on her chronic medical conditions.        HPI:  Chief Complaint   Patient presents with   • Follow-up     6mo - IFG, HTN, HL, Vit D Def, Labs; No new problems or concerns at this time.      -- Above per clinical staff and reviewed. --      HPI      Today:      Return in about 6 months (around 12/25/2024) for 6mo - IFG, HTN, HL, Vit D Def, Labs; PRN Shingrix #1, 2mo later Shingrix #2.       6mo OV         Obesity - Watching diet.  She not adding salt to food.  Does intermittent fasting during the work week.  +Exercise - Gym -  / Weights / Cardio / Class for 1 hour, 6 times per week.     IFG - No Metformin XR  previously.         HTN -  Management per Cardio Dr. He Mckeon.  Next appt 1/24 - Overdue.  On Losartan 100mg QD and Chlorthalidone 25mg QD.  BP check outside of office on arm cuff 130/75.     Hyperlipidemia - No statin previously.       Vitamin D Deficiency - s/p Drisdol.  Taking MVI, and OTC Vitamin D 2,000IU daily.        RUQ Abdominal Pain / Fatty Liver - Management per GI Dr. Aureliano Mckeon.  Next appt PRN.  s/p RUQ U/S on 3/9/22 - stable gallbladder. Negative NAVA Fibrosure and abdominal ultrasound elastography with fat quantification (UGAP) - no hepatic fibrosis.  Referred to Weight Management.  She defers as she is working  with the  / nutritionist at the gym, which is more affordable.  Work-up negative for fibrosis.       No Left Atrial Dilation (Measurement Error per Cardio) / Mitral Regurgitation / Tricuspid Regurgitation - Management per Cardio Dr. He Mckeon.  Next appt 1/24 - Overdue.  Pending repeat Echo when HTN is controlled.        OAB - Management per Gyn AYSE Wynne.  Next appt 3/25.  No meds.       AR - Stable on Claritin PRN.            Reviewed:  Labs 12/28/24, Gyn 3/26/24, GI 8/4/22, Cardio 1/10/23     Sees Gyn MsAYSE Hernandez at [facility] yearly.  Next appt 3/25.      Declines Shingrix, Tdap 6/20/23.    PHQ-2/9 Depression Screening    Little interest or pleasure in doing things: 0 - not at all  Feeling down, depressed, or hopeless: 0 - not at all  PHQ-2 Score: 0  PHQ-2 Interpretation: Negative depression screen             The following portions of the patient's history were reviewed and updated as appropriate: allergies, current medications, past family history, past medical history, past social history, past surgical history and problem list.      Review of Systems   Constitutional:  Negative for appetite change, chills, diaphoresis, fatigue and fever.   Respiratory:  Negative for chest tightness and shortness of breath.   "  Cardiovascular:  Negative for chest pain.   Gastrointestinal:  Negative for abdominal pain, blood in stool, diarrhea, nausea and vomiting.   Genitourinary:  Negative for dysuria.        Current Outpatient Medications   Medication Sig Dispense Refill   • chlorthalidone 25 mg tablet Take 1 tablet (25 mg total) by mouth daily 90 tablet 1   • GLUCOSAMINE-VITAMIN D PO Take 2 tablets by mouth in the morning Vitamin D 2000IU in 2 tabs     • loratadine (CLARITIN) 10 mg tablet Take 10 mg by mouth daily as needed     • losartan (COZAAR) 100 MG tablet Take 1 tablet (100 mg total) by mouth daily 90 tablet 1   • Multiple Vitamins-Minerals (WOMENS MULTIVITAMIN PO) Take 1 tablet by mouth in the morning         No current facility-administered medications for this visit.       Objective:  /74   Pulse 65   Temp 98.1 °F (36.7 °C) (Temporal)   Resp 16   Ht 5' 5\" (1.651 m)   Wt 86.4 kg (190 lb 6.4 oz)   LMP 09/24/2021 (Exact Date)   SpO2 100%   BMI 31.68 kg/m²    Wt Readings from Last 3 Encounters:   01/07/25 86.4 kg (190 lb 6.4 oz)   06/25/24 87.5 kg (193 lb)   04/24/24 86.6 kg (191 lb)      BP Readings from Last 3 Encounters:   01/07/25 146/74   06/25/24 122/68   03/26/24 121/70          Physical Exam  Vitals and nursing note reviewed.   Constitutional:       Appearance: Normal appearance. She is well-developed. She is obese.   HENT:      Head: Normocephalic and atraumatic.   Eyes:      Conjunctiva/sclera: Conjunctivae normal.   Neck:      Thyroid: No thyromegaly.   Cardiovascular:      Rate and Rhythm: Normal rate and regular rhythm.      Pulses: Normal pulses.      Heart sounds: Normal heart sounds.   Pulmonary:      Effort: Pulmonary effort is normal.      Breath sounds: Normal breath sounds.   Abdominal:      General: Bowel sounds are normal. There is no distension.      Palpations: Abdomen is soft. There is no mass.      Tenderness: There is no abdominal tenderness. There is no guarding or rebound. " "  Musculoskeletal:         General: No swelling or tenderness.      Cervical back: Neck supple.      Right lower leg: No edema.      Left lower leg: No edema.   Lymphadenopathy:      Cervical: No cervical adenopathy.   Neurological:      General: No focal deficit present.      Mental Status: She is alert and oriented to person, place, and time.   Psychiatric:         Mood and Affect: Mood normal.         Behavior: Behavior normal.         Thought Content: Thought content normal.         Judgment: Judgment normal.         Lab Results:      Lab Results   Component Value Date    WBC 5.9 12/28/2024    HGB 11.6 (L) 12/28/2024    HCT 34.0 (L) 12/28/2024     12/28/2024    TRIG 47 12/28/2024    HDL 81 12/28/2024    LDLDIRECT 160 (H) 12/28/2024    ALT 17 12/28/2024    AST 17 12/28/2024    K 4.2 12/28/2024     12/28/2024    CREATININE 0.60 12/28/2024    BUN 16 12/28/2024    CO2 30 12/28/2024    GLUF 119 (H) 07/30/2022    HGBA1C 5.5 12/28/2024     No results found for: \"URICACID\"  Invalid input(s): \"BASENAME\" Vitamin D    No results found.     POCT Labs                       "

## 2025-01-07 ENCOUNTER — OFFICE VISIT (OUTPATIENT)
Dept: FAMILY MEDICINE CLINIC | Facility: CLINIC | Age: 50
End: 2025-01-07
Payer: COMMERCIAL

## 2025-01-07 VITALS
SYSTOLIC BLOOD PRESSURE: 146 MMHG | BODY MASS INDEX: 31.72 KG/M2 | WEIGHT: 190.4 LBS | DIASTOLIC BLOOD PRESSURE: 74 MMHG | OXYGEN SATURATION: 100 % | TEMPERATURE: 98.1 F | HEIGHT: 65 IN | HEART RATE: 65 BPM | RESPIRATION RATE: 16 BRPM

## 2025-01-07 DIAGNOSIS — E78.49 OTHER HYPERLIPIDEMIA: ICD-10-CM

## 2025-01-07 DIAGNOSIS — E55.9 VITAMIN D DEFICIENCY: ICD-10-CM

## 2025-01-07 DIAGNOSIS — Z13.1 SCREENING FOR DIABETES MELLITUS: ICD-10-CM

## 2025-01-07 DIAGNOSIS — I10 PRIMARY HYPERTENSION: ICD-10-CM

## 2025-01-07 DIAGNOSIS — R73.01 IFG (IMPAIRED FASTING GLUCOSE): Primary | ICD-10-CM

## 2025-01-07 DIAGNOSIS — E66.811 CLASS 1 OBESITY WITH SERIOUS COMORBIDITY AND BODY MASS INDEX (BMI) OF 31.0 TO 31.9 IN ADULT, UNSPECIFIED OBESITY TYPE: ICD-10-CM

## 2025-01-07 DIAGNOSIS — Z13.6 SCREENING FOR CARDIOVASCULAR CONDITION: ICD-10-CM

## 2025-01-07 DIAGNOSIS — D64.9 NORMOCYTIC ANEMIA: ICD-10-CM

## 2025-01-07 DIAGNOSIS — J30.2 SEASONAL ALLERGIC RHINITIS, UNSPECIFIED TRIGGER: ICD-10-CM

## 2025-01-07 DIAGNOSIS — K76.0 FATTY LIVER: ICD-10-CM

## 2025-01-07 DIAGNOSIS — R16.0 HEPATOMEGALY: ICD-10-CM

## 2025-01-07 DIAGNOSIS — N32.81 OAB (OVERACTIVE BLADDER): ICD-10-CM

## 2025-01-07 DIAGNOSIS — I34.0 MITRAL VALVE INSUFFICIENCY, UNSPECIFIED ETIOLOGY: ICD-10-CM

## 2025-01-07 DIAGNOSIS — I07.1 TRICUSPID VALVE INSUFFICIENCY, UNSPECIFIED ETIOLOGY: ICD-10-CM

## 2025-01-07 PROCEDURE — 99214 OFFICE O/P EST MOD 30 MIN: CPT | Performed by: FAMILY MEDICINE

## 2025-01-07 RX ORDER — CHLORTHALIDONE 25 MG/1
25 TABLET ORAL DAILY
Qty: 90 TABLET | Refills: 1 | Status: SHIPPED | OUTPATIENT
Start: 2025-01-07

## 2025-01-07 RX ORDER — LOSARTAN POTASSIUM 100 MG/1
100 TABLET ORAL DAILY
Qty: 90 TABLET | Refills: 1 | Status: SHIPPED | OUTPATIENT
Start: 2025-01-07

## 2025-01-07 NOTE — ASSESSMENT & PLAN NOTE
Elevated BP today in office, with stable BP readings at home.  Continue Chlorthalidone 25mg QD and Losartan 100mg QD.  Check blood pressure outside of office.  Recommend lifestyle modifications.     Orders:  •  CBC and differential; Future  •  Comprehensive metabolic panel; Future  •  Magnesium; Future  •  chlorthalidone 25 mg tablet; Take 1 tablet (25 mg total) by mouth daily  •  losartan (COZAAR) 100 MG tablet; Take 1 tablet (100 mg total) by mouth daily

## 2025-01-07 NOTE — ASSESSMENT & PLAN NOTE
Stable s statin.  Recommend lifestyle modifications.     The 10-year ASCVD risk score (Clifton KAUR, et al., 2019) is: 2.1%    Values used to calculate the score:      Age: 49 years      Sex: Female      Is Non- : No      Diabetic: No      Tobacco smoker: No      Systolic Blood Pressure: 146 mmHg      Is BP treated: Yes      HDL Cholesterol: 81 mg/dL      Total Cholesterol: 258 mg/dL      Orders:  •  CBC and differential; Future  •  Comprehensive metabolic panel; Future  •  Lipid panel; Future  •  TSH, 3rd generation with Free T4 reflex; Future  •  LDL cholesterol, direct; Future

## 2025-01-07 NOTE — ASSESSMENT & PLAN NOTE
Stable.  Patient declines Metformin XR, GLP-1 agonist, and Pre-DM education.  Recommend lifestyle modifications.             Orders:  •  Comprehensive metabolic panel; Future  •  Hemoglobin A1C; Future

## 2025-01-07 NOTE — ASSESSMENT & PLAN NOTE
Stable.  Recommend lifestyle modifications.  Patient declines weight management and GLP-1 agonist.     Orders:  •  Hemoglobin A1C; Future

## 2025-01-07 NOTE — ASSESSMENT & PLAN NOTE
Stable. Continue MVI and OTC Vitamin D 2,000IU daily.     Orders:  •  Hemoglobin A1C; Future  •  Vitamin D 25 hydroxy; Future

## 2025-05-25 ENCOUNTER — OFFICE VISIT (OUTPATIENT)
Dept: URGENT CARE | Facility: CLINIC | Age: 50
End: 2025-05-25
Payer: COMMERCIAL

## 2025-05-25 ENCOUNTER — APPOINTMENT (OUTPATIENT)
Dept: RADIOLOGY | Facility: CLINIC | Age: 50
End: 2025-05-25
Payer: COMMERCIAL

## 2025-05-25 VITALS
SYSTOLIC BLOOD PRESSURE: 162 MMHG | DIASTOLIC BLOOD PRESSURE: 82 MMHG | RESPIRATION RATE: 18 BRPM | TEMPERATURE: 97.4 F | OXYGEN SATURATION: 100 % | HEART RATE: 56 BPM

## 2025-05-25 DIAGNOSIS — M25.562 ACUTE PAIN OF LEFT KNEE: ICD-10-CM

## 2025-05-25 DIAGNOSIS — M25.562 ACUTE PAIN OF LEFT KNEE: Primary | ICD-10-CM

## 2025-05-25 PROCEDURE — 73564 X-RAY EXAM KNEE 4 OR MORE: CPT

## 2025-05-25 PROCEDURE — G0382 LEV 3 HOSP TYPE B ED VISIT: HCPCS

## 2025-05-25 PROCEDURE — S9083 URGENT CARE CENTER GLOBAL: HCPCS

## 2025-05-25 NOTE — PATIENT INSTRUCTIONS
Check my chart for final radiology results.  Tylenol/ibuprofen as needed.  Alternate between ice and heat.  Topical pain medicated rubs such as voltaren gel, lidocaine cream/patches.  Compression bandage for added support.    Follow up with ortho if no improvement.   Follow up with PCP in 3-5 days.  Proceed to the ER with worsening symptoms.

## 2025-05-25 NOTE — PROGRESS NOTES
"  [facility]        NAME: Sarah Whitley is a 50 y.o. female  : 1975    MRN: [id number]  DATE: May 25, 2025  TIME: 9:33 AM    Assessment and Plan   Acute pain of left knee [M25.562]  1. Acute pain of left knee  XR knee 4+ vw left injury    Ambulatory referral to Orthopedic Surgery    Diclofenac Sodium (VOLTAREN) 1 %        Xray appears negative for any fracture. Will follow up with radiologist report when available.     Patient Instructions     Check my chart for final radiology results.  Tylenol/ibuprofen as needed.  Alternate between ice and heat.  Topical pain medicated rubs such as voltaren gel, lidocaine cream/patches.  Compression bandage for added support.    Follow up with ortho if no improvement.   Follow up with PCP in 3-5 days.  Proceed to the ER with worsening symptoms.     Chief Complaint     Chief Complaint   Patient presents with    Knee Pain     Patient with left knee pain. Was at gym on treadmill walking and heard a cracking noise a few weeks ago. Was painful and used ibuprofen and ice packs. Pain has become constant about 4 days ago. Still taking ibuprofen.          History of Present Illness       The patient presents today with complaints of L knee pain x a few weeks. She was walking on a treadmill when she felt a \"crack\" followed by slight pain. States her knee did not give out after she felt the crack. She was able to finish her exercise, but has been having pain since. Denies any further popping, clicking, or feeling like it is going to give out. Denies swelling. Pain specifically located to the medial aspect of her knee. Pain increases with weight bearing. Pain will occasionally wake her up at night. Has been taking ibuprofen as needed. Switched to elliptical instead for exercising. Denies previous history of fracture/surgery to her L knee.       Review of Systems   Review of Systems   Constitutional:  Negative for chills and fever.   Musculoskeletal:  Positive for " arthralgias (L knee) and gait problem (L knee). Negative for joint swelling.   Skin:  Negative for color change and wound.         Current Medications     Current Medications[1]    Current Allergies     Allergies as of 05/25/2025 - Reviewed 05/25/2025   Allergen Reaction Noted    Codeine Rash 04/21/2014            The following portions of the patient's history were reviewed and updated as appropriate: allergies, current medications, past family history, past medical history, past social history, past surgical history and problem list.     Past Medical History[2]    Past Surgical History[3]    Family History[4]      Medications have been verified.        Objective   /82   Pulse 56   Temp (!) 97.4 °F (36.3 °C)   Resp 18   LMP 09/24/2021 (Exact Date)   SpO2 100%        Physical Exam     Physical Exam  Vitals and nursing note reviewed.   Constitutional:       General: She is not in acute distress.     Appearance: Normal appearance.   HENT:      Head: Normocephalic and atraumatic.      Right Ear: External ear normal.      Left Ear: External ear normal.      Mouth/Throat:      Mouth: Mucous membranes are moist.     Eyes:      Pupils: Pupils are equal, round, and reactive to light.       Cardiovascular:      Rate and Rhythm: Normal rate.   Pulmonary:      Effort: Pulmonary effort is normal.     Musculoskeletal:      Left knee: No swelling or crepitus. Normal range of motion. Tenderness present over the medial joint line. Normal pulse.     Skin:     General: Skin is warm and dry.     Neurological:      Mental Status: She is alert and oriented to person, place, and time. Mental status is at baseline.     Psychiatric:         Mood and Affect: Mood normal.         Behavior: Behavior normal.                        [1]   Current Outpatient Medications:     chlorthalidone 25 mg tablet, Take 1 tablet (25 mg total) by mouth daily, Disp: 90 tablet, Rfl: 1    Diclofenac Sodium (VOLTAREN) 1 %, Apply 2 g topically 4 (four)  times a day as needed (pain), Disp: 50 g, Rfl: 0    GLUCOSAMINE-VITAMIN D PO, Take 2 tablets by mouth in the morning Vitamin D 2000IU in 2 tabs, Disp: , Rfl:     loratadine (CLARITIN) 10 mg tablet, Take 10 mg by mouth daily as needed, Disp: , Rfl:     losartan (COZAAR) 100 MG tablet, Take 1 tablet (100 mg total) by mouth daily, Disp: 90 tablet, Rfl: 1    Multiple Vitamins-Minerals (WOMENS MULTIVITAMIN PO), Take 1 tablet by mouth in the morning, Disp: , Rfl:   [2]   Past Medical History:  Diagnosis Date    Class 2 severe obesity with serious comorbidity and body mass index (BMI) of 36.0 to 36.9 in adult (HCC)     Fatty liver 03/2022    Hepatomegaly 03/2022    IFG (impaired fasting glucose) 02/05/2023    Mitral regurgitation 03/2022    Trace    OAB (overactive bladder)     Other hyperlipidemia 03/2022    Primary hypertension     Seasonal allergic rhinitis     Tricuspid regurgitation 03/2022    Vitamin D deficiency 03/2022   [3]   Past Surgical History:  Procedure Laterality Date    HYSTEROSCOPY W/ POLYPECTOMY      times 2/ 2016/ 2nd one aug 18/18; Endometrial Polyp    WISDOM TOOTH EXTRACTION     [4]   Family History  Problem Relation Name Age of Onset    Hypertension Mother      Arrhythmia Mother      Diabetes type II Mother      Prostate cancer Father  70    Hypertension Father      Rheum arthritis Father  66    Congenital Hearing Loss Father      Other Sister Flores         pseudomyxoma peritonei    No Known Problems Sister Bisi     No Known Problems Sister Rose Mary     Parkinsonism Maternal Grandmother      Valvular heart disease Maternal Grandfather      No Known Problems Paternal Grandmother      Heart attack Paternal Grandfather      Coronary artery disease Paternal Grandfather      No Known Problems Maternal Aunt      No Known Problems Maternal Aunt      No Known Problems Paternal Aunt      Breast cancer Family great aunt          2 paternal great aunts late in life / both are decsd    Alcohol abuse Neg Hx       Substance Abuse Neg Hx      Depression Neg Hx      Mental illness Neg Hx

## 2025-05-27 ENCOUNTER — DOCUMENTATION (OUTPATIENT)
Dept: ADMINISTRATIVE | Facility: OTHER | Age: 50
End: 2025-05-27

## 2025-05-27 NOTE — PROGRESS NOTES
05/27/25 3:41 PM    Patient was called after the Urgent Care visit . Home BP reading(s) was/were Has not checked it since Urgent care visit. Patient has no symptoms., Patient has an upcoming appointment with their primary care provider on 7/9/2025 to follow on an elevated Blood pressure.      Thank you.  Dionna Osullivan MA  [facility]

## 2025-05-28 ENCOUNTER — OFFICE VISIT (OUTPATIENT)
Dept: OBGYN CLINIC | Facility: CLINIC | Age: 50
End: 2025-05-28
Payer: COMMERCIAL

## 2025-05-28 VITALS — HEIGHT: 65 IN | WEIGHT: 190 LBS | BODY MASS INDEX: 31.65 KG/M2

## 2025-05-28 DIAGNOSIS — M25.462 EFFUSION OF LEFT KNEE: ICD-10-CM

## 2025-05-28 DIAGNOSIS — M25.561 ACUTE PAIN OF RIGHT KNEE: ICD-10-CM

## 2025-05-28 DIAGNOSIS — M23.92 INTERNAL DERANGEMENT OF LEFT KNEE: Primary | ICD-10-CM

## 2025-05-28 PROCEDURE — 99204 OFFICE O/P NEW MOD 45 MIN: CPT | Performed by: ORTHOPAEDIC SURGERY

## 2025-05-28 NOTE — PROGRESS NOTES
Name: Sarah Whitley      : 1975      MRN: [id number]  Encounter Provider: Shun Gage DO  Encounter Date: 2025   Encounter department: [facility] [place]  :  Assessment & Plan  Internal derangement of left knee    Orders:    MRI knee left wo contrast; Future    Effusion of left knee    Orders:    MRI knee left wo contrast; Future    Acute pain of right knee               Right knee pain and effusion after acute injury, concern for medial meniscus tear  X-rays reviewed in office today with patient  Due to her age, activity level, normal x-rays, and physical exam findings including painful range of motion, effusion, and positive special testing, I recommended MRI for further evaluation for suspected medial meniscus tear to be used in preoperative planning.  I will see the patient back after MRI for discussion of results and further treatment planning.    Chief Complaint     Right knee pain    History of the Present Illness     History of Present Illness   HPI   Sarah Whitley is a 50 y.o. female with Left knee pain that began nearly 1 month ago.  She reports she was walking on the treadmill when she felt a sudden loud crack and shortly after began to have pain over her medial knee.  She notes a sensation of swelling.  She began using the elliptical after this incident but does have persistent pain with extended standing and walking.  She has difficulty with deep knee bending.  She denies any discrete locking but does have pain when fully bending and straightening her knee.  She is taking ibuprofen for pain relief.  No other new complaints.     Review of Systems     Review of Systems   Constitutional:  Negative for appetite change and unexpected weight change.   HENT:  Negative for congestion and trouble swallowing.    Eyes:  Negative for visual disturbance.   Respiratory:  Negative for cough and shortness of breath.    Cardiovascular:  Negative for chest pain  "and palpitations.   Gastrointestinal:  Negative for nausea and vomiting.   Endocrine: Negative for cold intolerance and heat intolerance.   Musculoskeletal:  Negative for gait problem and myalgias.   Skin:  Negative for rash.   Neurological:  Negative for numbness.       Physical Exam     Objective   Ht 5' 5\" (1.651 m)   Wt 86.2 kg (190 lb)   LMP 09/24/2021 (Exact Date)   BMI 31.62 kg/m²        Right Knee  Range of motion from 0 to 120 with pain at terminal flexion extension.    There is an effusion.    There is  tenderness over the medial joint line.    There is 5/5 quadriceps strength and normal tone.    The patient is able to perform a straight leg raise.    negative patellar grind test.  Anterior drawer tests is negative  negative Lachman Test.   Posterior drawer test is   negative   Varus stress testing reveals no instability at 0 and 30 degrees   Valgus stress testing reveals no instability at 0 and 30 degrees  Nitza's testing is positive    The patient is neurovascular intact distally.      Eyes:  Anicteric sclerae.  Neck:  Supple.  Lungs:  Normal respiratory effort.  Cardiovascular:  Capillary refill is less than 2 seconds.  Skin:  Intact without erythema.  Neurologic:  Sensation grossly intact to light touch.  Psychiatric:  Mood and affect are appropriate.    Data Review     I have personally reviewed pertinent films in PACS, and my interpretation follows:    Nonweightbearing x-rays taken 5/25/2025 of Right knee independently reviewed and demonstrate no fracture or dislocation.  Joint spaces well-maintained.    Urgent care note from 5/25/2025 reviewed.    Lab Results   Component Value Date/Time    HGBA1C 5.5 12/28/2024 11:09 AM       Past Medical History[1]    Past Surgical History[2]    Allergies[3]    Medications Ordered Prior to Encounter[4]    Social History[5]    Family History[6]          Procedures Performed     Procedures  None      Shun Gage DO          [1]   Past Medical " History:  Diagnosis Date    Class 2 severe obesity with serious comorbidity and body mass index (BMI) of 36.0 to 36.9 in adult (HCC)     Fatty liver 03/2022    Hepatomegaly 03/2022    IFG (impaired fasting glucose) 02/05/2023    Mitral regurgitation 03/2022    Trace    OAB (overactive bladder)     Other hyperlipidemia 03/2022    Primary hypertension     Seasonal allergic rhinitis     Tricuspid regurgitation 03/2022    Vitamin D deficiency 03/2022   [2]   Past Surgical History:  Procedure Laterality Date    HYSTEROSCOPY W/ POLYPECTOMY      times 2/ 2016/ 2nd one aug 18/18; Endometrial Polyp    WISDOM TOOTH EXTRACTION     [3]   Allergies  Allergen Reactions    Codeine Rash   [4]   Current Outpatient Medications on File Prior to Visit   Medication Sig Dispense Refill    chlorthalidone 25 mg tablet Take 1 tablet (25 mg total) by mouth daily 90 tablet 1    Diclofenac Sodium (VOLTAREN) 1 % Apply 2 g topically 4 (four) times a day as needed (pain) 50 g 0    GLUCOSAMINE-VITAMIN D PO Take 2 tablets by mouth in the morning Vitamin D 2000IU in 2 tabs      loratadine (CLARITIN) 10 mg tablet Take 10 mg by mouth daily as needed      losartan (COZAAR) 100 MG tablet Take 1 tablet (100 mg total) by mouth daily 90 tablet 1    Multiple Vitamins-Minerals (WOMENS MULTIVITAMIN PO) Take 1 tablet by mouth in the morning       No current facility-administered medications on file prior to visit.   [5]   Social History  Tobacco Use    Smoking status: Never     Passive exposure: Never    Smokeless tobacco: Never   Vaping Use    Vaping status: Never Used   Substance Use Topics    Alcohol use: Not Currently    Drug use: No   [6]   Family History  Problem Relation Name Age of Onset    Hypertension Mother      Arrhythmia Mother      Diabetes type II Mother      Prostate cancer Father  70    Hypertension Father      Rheum arthritis Father  66    Congenital Hearing Loss Father      Other Sister Flores         pseudomyxoma peritonei    No Known  Problems Sister Bisi     No Known Problems Sister Rose Mary     Parkinsonism Maternal Grandmother      Valvular heart disease Maternal Grandfather      No Known Problems Paternal Grandmother      Heart attack Paternal Grandfather      Coronary artery disease Paternal Grandfather      No Known Problems Maternal Aunt      No Known Problems Maternal Aunt      No Known Problems Paternal Aunt      Breast cancer Family great aunt          2 paternal great aunts late in life / both are decsd    Alcohol abuse Neg Hx      Substance Abuse Neg Hx      Depression Neg Hx      Mental illness Neg Hx

## 2025-06-05 ENCOUNTER — HOSPITAL ENCOUNTER (OUTPATIENT)
Dept: MRI IMAGING | Facility: HOSPITAL | Age: 50
End: 2025-06-05
Attending: ORTHOPAEDIC SURGERY
Payer: COMMERCIAL

## 2025-06-05 DIAGNOSIS — M23.92 INTERNAL DERANGEMENT OF LEFT KNEE: ICD-10-CM

## 2025-06-05 DIAGNOSIS — M25.462 EFFUSION OF LEFT KNEE: ICD-10-CM

## 2025-06-05 PROCEDURE — 73721 MRI JNT OF LWR EXTRE W/O DYE: CPT

## 2025-06-09 ENCOUNTER — TELEPHONE (OUTPATIENT)
Age: 50
End: 2025-06-09

## 2025-06-09 NOTE — TELEPHONE ENCOUNTER
Doctor: Too  Caller Name: Gina Agosto CB#: N/A     Radiology called to speak to clinical team regarding MRI ordered by provider.    *No call back necessary unless provider has questions.*

## 2025-06-11 ENCOUNTER — OFFICE VISIT (OUTPATIENT)
Dept: OBGYN CLINIC | Facility: CLINIC | Age: 50
End: 2025-06-11
Payer: COMMERCIAL

## 2025-06-11 VITALS — WEIGHT: 190 LBS | BODY MASS INDEX: 31.65 KG/M2 | HEIGHT: 65 IN

## 2025-06-11 DIAGNOSIS — S83.249A ACUTE TEAR OF POSTERIOR HORN OF MEDIAL MENISCUS: Primary | ICD-10-CM

## 2025-06-11 DIAGNOSIS — S82.132A CLOSED FRACTURE OF MEDIAL PORTION OF LEFT TIBIAL PLATEAU, INITIAL ENCOUNTER: ICD-10-CM

## 2025-06-11 DIAGNOSIS — M25.462 EFFUSION OF LEFT KNEE: ICD-10-CM

## 2025-06-11 PROCEDURE — 99214 OFFICE O/P EST MOD 30 MIN: CPT | Performed by: ORTHOPAEDIC SURGERY

## 2025-06-11 NOTE — PROGRESS NOTES
Name: Sarah Whitley      : 1975      MRN: [id number]  Encounter Provider: Shun Gage DO  Encounter Date: 2025   Encounter department: [facility] [place]  :  Assessment & Plan  Acute tear of posterior horn of medial meniscus    Orders:    Medial  Knee Brace    Closed fracture of medial portion of left tibial plateau, initial encounter    Orders:    Medial  Knee Brace    Effusion of left knee               Left knee posterior medial meniscus root tear with associated subchondral fracture medial tibial plateau  Diagnostic imaging including x-rays and MRI of left knee displaying medial meniscus root tear were reviewed and discussed in the office with the patient today.    We discussed in detail the anatomy and function of the native meniscus and the disruption of mechanical function after meniscal root tear.  The patient has persistent symptoms including pain with knee flexion and kneeling which are affecting her activities of daily living.    Non operative treatment was discussed in the form of activity modification, oral analgesics, injection therapy, formal physical therapy, home exercise program, and bracing.  Risks of continued non operative management include persistent pain, decreased function, and rapid progression of osteoarthritis and need for subsequent surgery including arthroplasty.    Surgical intervention was also discussed in the form of left knee arthroscopy with medial meniscus root repair.  Risks of surgery, including but not limited to, persistent pain, recurrent tearing, inability to perform a repair due to poor meniscal tissue or more significant degenerative change than is identified on preoperative imaging, progressive osteoarthritis, blood clots, postoperative stiffness, infection, anesthesia complications, and need for subsequent surgery including total knee replacement.    We discussed postoperative protocol including 6  "weeks of maintaining nonweightbearing restrictions with brace use.  We also discussed the importance of early physical therapy to work on range of motion to prevent stiffness as well as early strength  The patient understands and has elected to proceed forward with nonoperative management at this time due to improvement in pain since prior visit.  Medial  knee brace ordered today. The patient was provided with our brace fitter's information to schedule fitting appointment  Follow up 6 weeks for reevaluation and new WB xrays upon arrival.  If symptoms persist or worsen we will consider knee arthroscopy with meniscal root repair.      History of the Present Illness   History of Present Illness   HPI   Sarah Whitley is a 50 y.o. female with Left knee pain after an injury while walking on her treadmill approximately 6 weeks ago. She admits her pain has improved since her last visit. She still has mild pain with walking and with activity. Pain with knee flexion and difficulty kneeling. Patient presents for MRI follow up today. She has been using an ace wrap at home but is wondering if there is something better she can use for stability.         Review of Systems     Review of Systems   Constitutional:  Negative for chills and fever.   HENT:  Negative for ear pain and sore throat.    Eyes:  Negative for pain and visual disturbance.   Respiratory:  Negative for cough and shortness of breath.    Cardiovascular:  Negative for chest pain and palpitations.   Gastrointestinal:  Negative for abdominal pain and vomiting.   Genitourinary:  Negative for dysuria and hematuria.   Musculoskeletal:  Negative for arthralgias and back pain.   Skin:  Negative for color change and rash.   Neurological:  Negative for seizures and syncope.   All other systems reviewed and are negative.      Physical Exam   Objective   Ht 5' 5\" (1.651 m)   Wt 86.2 kg (190 lb)   LMP 09/24/2021 (Exact Date)   BMI 31.62 kg/m²        Left  " Knee  Range of motion from 0 to 110 with pain at terminal flexion.    There is small effusion.    There is mild tenderness over the medial joint line.    The patient is able to perform a straight leg raise.    Varus stress testing reveals no instability at 0 and 30 degrees   Valgus stress testing reveals no instability at 0 and 30 degrees  The patient is neurovascular intact distally.      Data Review     I have personally reviewed pertinent films in PACS, and my interpretation follows.    Nonweightbearing x-rays taken 5/25/2025 of Right knee independently reviewed and demonstrate no fracture or dislocation.  Joint spaces well-maintained.     MRI left knee obtained 6/5/2025 demonstrate nondisplaced medial tibial plateau subchondral fracture, tear of posterior root of medial meniscus     Lab Results   Component Value Date/Time    HGBA1C 5.5 12/28/2024 11:09 AM       Social History[1]        Procedures  None.    Susan Ibrahim   Scribe Attestation      I,:  Susan Ibrahim am acting as a scribe while in the presence of the attending physician.:       I,:  Shun Gage, DO personally performed the services described in this documentation    as scribed in my presence.:                  [1]   Social History  Tobacco Use    Smoking status: Never     Passive exposure: Never    Smokeless tobacco: Never   Vaping Use    Vaping status: Never Used   Substance Use Topics    Alcohol use: Not Currently    Drug use: No

## 2025-06-29 LAB
25(OH)D3 SERPL-MCNC: 60 NG/ML (ref 30–100)
ALBUMIN SERPL-MCNC: 4.5 G/DL (ref 3.6–5.1)
ALBUMIN/GLOB SERPL: 2 (CALC) (ref 1–2.5)
ALP SERPL-CCNC: 68 U/L (ref 37–153)
ALT SERPL-CCNC: 14 U/L (ref 6–29)
AST SERPL-CCNC: 17 U/L (ref 10–35)
BASOPHILS # BLD AUTO: 51 CELLS/UL (ref 0–200)
BASOPHILS NFR BLD AUTO: 0.7 %
BILIRUB SERPL-MCNC: 0.7 MG/DL (ref 0.2–1.2)
BUN SERPL-MCNC: 22 MG/DL (ref 7–25)
BUN/CREAT SERPL: NORMAL (CALC) (ref 6–22)
CALCIUM SERPL-MCNC: 9.7 MG/DL (ref 8.6–10.4)
CHLORIDE SERPL-SCNC: 102 MMOL/L (ref 98–110)
CHOLEST SERPL-MCNC: 255 MG/DL
CHOLEST/HDLC SERPL: 3 (CALC)
CO2 SERPL-SCNC: 28 MMOL/L (ref 20–32)
CREAT SERPL-MCNC: 0.62 MG/DL (ref 0.5–1.03)
EOSINOPHIL # BLD AUTO: 307 CELLS/UL (ref 15–500)
EOSINOPHIL NFR BLD AUTO: 4.2 %
ERYTHROCYTE [DISTWIDTH] IN BLOOD BY AUTOMATED COUNT: 12.5 % (ref 11–15)
GFR/BSA.PRED SERPLBLD CYS-BASED-ARV: 107 ML/MIN/1.73M2
GLOBULIN SER CALC-MCNC: 2.3 G/DL (CALC) (ref 1.9–3.7)
GLUCOSE SERPL-MCNC: 88 MG/DL (ref 65–99)
HBA1C MFR BLD: 5.5 %
HCT VFR BLD AUTO: 37.1 % (ref 35–45)
HDLC SERPL-MCNC: 85 MG/DL
HGB BLD-MCNC: 12 G/DL (ref 11.7–15.5)
LDLC SERPL CALC-MCNC: 156 MG/DL (CALC)
LDLC SERPL DIRECT ASSAY-MCNC: 172 MG/DL
LYMPHOCYTES # BLD AUTO: 2117 CELLS/UL (ref 850–3900)
LYMPHOCYTES NFR BLD AUTO: 29 %
MAGNESIUM SERPL-MCNC: 1.9 MG/DL (ref 1.5–2.5)
MCH RBC QN AUTO: 30.1 PG (ref 27–33)
MCHC RBC AUTO-ENTMCNC: 32.3 G/DL (ref 32–36)
MCV RBC AUTO: 93 FL (ref 80–100)
MONOCYTES # BLD AUTO: 577 CELLS/UL (ref 200–950)
MONOCYTES NFR BLD AUTO: 7.9 %
NEUTROPHILS # BLD AUTO: 4249 CELLS/UL (ref 1500–7800)
NEUTROPHILS NFR BLD AUTO: 58.2 %
NONHDLC SERPL-MCNC: 170 MG/DL (CALC)
PLATELET # BLD AUTO: 294 THOUSAND/UL (ref 140–400)
PMV BLD REES-ECKER: 9.5 FL (ref 7.5–12.5)
POTASSIUM SERPL-SCNC: 4.2 MMOL/L (ref 3.5–5.3)
PROT SERPL-MCNC: 6.8 G/DL (ref 6.1–8.1)
RBC # BLD AUTO: 3.99 MILLION/UL (ref 3.8–5.1)
SODIUM SERPL-SCNC: 138 MMOL/L (ref 135–146)
TRIGL SERPL-MCNC: 50 MG/DL
TSH SERPL-ACNC: 0.94 MIU/L
WBC # BLD AUTO: 7.3 THOUSAND/UL (ref 3.8–10.8)

## 2025-07-09 ENCOUNTER — OFFICE VISIT (OUTPATIENT)
Dept: FAMILY MEDICINE CLINIC | Facility: CLINIC | Age: 50
End: 2025-07-09
Payer: COMMERCIAL

## 2025-07-09 VITALS
HEART RATE: 60 BPM | BODY MASS INDEX: 31.72 KG/M2 | WEIGHT: 190.4 LBS | SYSTOLIC BLOOD PRESSURE: 130 MMHG | DIASTOLIC BLOOD PRESSURE: 70 MMHG | OXYGEN SATURATION: 100 % | HEIGHT: 65 IN | TEMPERATURE: 98.2 F

## 2025-07-09 DIAGNOSIS — I10 PRIMARY HYPERTENSION: ICD-10-CM

## 2025-07-09 DIAGNOSIS — E78.49 OTHER HYPERLIPIDEMIA: ICD-10-CM

## 2025-07-09 DIAGNOSIS — K76.0 FATTY LIVER: ICD-10-CM

## 2025-07-09 DIAGNOSIS — R73.01 IFG (IMPAIRED FASTING GLUCOSE): ICD-10-CM

## 2025-07-09 DIAGNOSIS — E66.811 CLASS 1 OBESITY WITH SERIOUS COMORBIDITY AND BODY MASS INDEX (BMI) OF 31.0 TO 31.9 IN ADULT, UNSPECIFIED OBESITY TYPE: ICD-10-CM

## 2025-07-09 DIAGNOSIS — S83.249A ACUTE TEAR OF POSTERIOR HORN OF MEDIAL MENISCUS: ICD-10-CM

## 2025-07-09 DIAGNOSIS — N32.81 OAB (OVERACTIVE BLADDER): ICD-10-CM

## 2025-07-09 DIAGNOSIS — J30.2 SEASONAL ALLERGIC RHINITIS, UNSPECIFIED TRIGGER: ICD-10-CM

## 2025-07-09 DIAGNOSIS — Z87.311 HISTORY OF FRAGILITY FRACTURE: ICD-10-CM

## 2025-07-09 DIAGNOSIS — E55.9 VITAMIN D DEFICIENCY: ICD-10-CM

## 2025-07-09 DIAGNOSIS — R16.0 HEPATOMEGALY: ICD-10-CM

## 2025-07-09 DIAGNOSIS — E66.811 OBESITY (BMI 30.0-34.9): ICD-10-CM

## 2025-07-09 DIAGNOSIS — I07.1 TRICUSPID VALVE INSUFFICIENCY, UNSPECIFIED ETIOLOGY: ICD-10-CM

## 2025-07-09 DIAGNOSIS — I34.0 MITRAL VALVE INSUFFICIENCY, UNSPECIFIED ETIOLOGY: ICD-10-CM

## 2025-07-09 DIAGNOSIS — Z00.00 ANNUAL PHYSICAL EXAM: Primary | ICD-10-CM

## 2025-07-09 DIAGNOSIS — Z78.0 ASYMPTOMATIC POSTMENOPAUSAL STATE: ICD-10-CM

## 2025-07-09 DIAGNOSIS — S82.132A CLOSED FRACTURE OF MEDIAL PORTION OF LEFT TIBIAL PLATEAU, INITIAL ENCOUNTER: ICD-10-CM

## 2025-07-09 PROCEDURE — 99396 PREV VISIT EST AGE 40-64: CPT | Performed by: FAMILY MEDICINE

## 2025-07-09 PROCEDURE — 99214 OFFICE O/P EST MOD 30 MIN: CPT | Performed by: FAMILY MEDICINE

## 2025-07-09 RX ORDER — LOSARTAN POTASSIUM 100 MG/1
100 TABLET ORAL DAILY
Qty: 90 TABLET | Refills: 1 | Status: SHIPPED | OUTPATIENT
Start: 2025-07-09

## 2025-07-09 RX ORDER — CHLORTHALIDONE 25 MG/1
25 TABLET ORAL DAILY
Qty: 90 TABLET | Refills: 1 | Status: SHIPPED | OUTPATIENT
Start: 2025-07-09

## 2025-07-09 NOTE — ASSESSMENT & PLAN NOTE
Stable.  Patient declines Metformin XR, GLP-1 agonist, and Pre-DM education.  Recommend lifestyle modifications.               Orders:    Comprehensive metabolic panel; Future    Hemoglobin A1C; Future

## 2025-07-09 NOTE — ASSESSMENT & PLAN NOTE
Stable. Continue MVI and OTC Vitamin D 2,000IU daily.       Orders:    Vitamin D 25 hydroxy; Future

## 2025-07-09 NOTE — ASSESSMENT & PLAN NOTE
Elevated BP today in office, with stable BP readings at home.  Continue Chlorthalidone 25mg QD and Losartan 100mg QD.  Check blood pressure outside of office.  Recommend lifestyle modifications.       Orders:    Comprehensive metabolic panel; Future    Magnesium; Future    chlorthalidone 25 mg tablet; Take 1 tablet (25 mg total) by mouth daily    losartan (COZAAR) 100 MG tablet; Take 1 tablet (100 mg total) by mouth daily

## 2025-07-09 NOTE — ASSESSMENT & PLAN NOTE
Management per Ortho.  Pending Dexa and osteoporosis labs.    Orders:    DXA bone density spine hip and pelvis; Future    Phosphorus; Future    PTH, intact; Future

## 2025-07-09 NOTE — PATIENT INSTRUCTIONS
"Patient Education     Routine physical for adults   The Basics   Written by the doctors and editors at [facility]   What is a physical? -- A physical is a routine visit, or \"check-up,\" with your doctor. You might also hear it called a \"wellness visit\" or \"preventive visit.\"  During each visit, the doctor will:   Ask about your physical and mental health   Ask about your habits, behaviors, and lifestyle   Do an exam   Give you vaccines if needed   Talk to you about any medicines you take   Give advice about your health   Answer your questions  Getting regular check-ups is an important part of taking care of your health. It can help your doctor find and treat any problems you have. But it's also important for preventing health problems.  A routine physical is different from a \"sick visit.\" A sick visit is when you see a doctor because of a health concern or problem. Since physicals are scheduled ahead of time, you can think about what you want to ask the doctor.  How often should I get a physical? -- It depends on your age and health. In general, for people age 21 years and older:   If you are younger than 50 years, you might be able to get a physical every 3 years.   If you are 50 years or older, your doctor might recommend a physical every year.  If you have an ongoing health condition, like diabetes or high blood pressure, your doctor will probably want to see you more often.  What happens during a physical? -- In general, each visit will include:   Physical exam - The doctor or nurse will check your height, weight, heart rate, and blood pressure. They will also look at your eyes and ears. They will ask about how you are feeling and whether you have any symptoms that bother you.   Medicines - It's a good idea to bring a list of all the medicines you take to each doctor visit. Your doctor will talk to you about your medicines and answer any questions. Tell them if you are having any side effects that bother you. You " "should also tell them if you are having trouble paying for any of your medicines.   Habits and behaviors - This includes:   Your diet   Your exercise habits   Whether you smoke, drink alcohol, or use drugs   Whether you are sexually active   Whether you feel safe at home  Your doctor will talk to you about things you can do to improve your health and lower your risk of health problems. They will also offer help and support. For example, if you want to quit smoking, they can give you advice and might prescribe medicines. If you want to improve your diet or get more physical activity, they can help you with this, too.   Lab tests, if needed - The tests you get will depend on your age and situation. For example, your doctor might want to check your:   Cholesterol   Blood sugar   Iron level   Vaccines - The recommended vaccines will depend on your age, health, and what vaccines you already had. Vaccines are very important because they can prevent certain serious or deadly infections.   Discussion of screening - \"Screening\" means checking for diseases or other health problems before they cause symptoms. Your doctor can recommend screening based on your age, risk, and preferences. This might include tests to check for:   Cancer, such as breast, prostate, cervical, ovarian, colorectal, prostate, lung, or skin cancer   Sexually transmitted infections, such as chlamydia and gonorrhea   Mental health conditions like depression and anxiety  Your doctor will talk to you about the different types of screening tests. They can help you decide which screenings to have. They can also explain what the results might mean.   Answering questions - The physical is a good time to ask the doctor or nurse questions about your health. If needed, they can refer you to other doctors or specialists, too.  Adults older than 65 years often need other care, too. As you get older, your doctor will talk to you about:   How to prevent falling at " home   Hearing or vision tests   Memory testing   How to take your medicines safely   Making sure that you have the help and support you need at home  All topics are updated as new evidence becomes available and our peer review process is complete.  This topic retrieved from [employer] on: May 02, 2024.  Topic 979168 Version 1.0  Release: 32.4.3 - C32.122  © 2024 UpToDate, Inc. and/or its affiliates. All rights reserved.  Consumer Information Use and Disclaimer   Disclaimer: This generalized information is a limited summary of diagnosis, treatment, and/or medication information. It is not meant to be comprehensive and should be used as a tool to help the user understand and/or assess potential diagnostic and treatment options. It does NOT include all information about conditions, treatments, medications, side effects, or risks that may apply to a specific patient. It is not intended to be medical advice or a substitute for the medical advice, diagnosis, or treatment of a health care provider based on the health care provider's examination and assessment of a patient's specific and unique circumstances. Patients must speak with a health care provider for complete information about their health, medical questions, and treatment options, including any risks or benefits regarding use of medications. This information does not endorse any treatments or medications as safe, effective, or approved for treating a specific patient. UpToDate, Inc. and its affiliates disclaim any warranty or liability relating to this information or the use thereof.The use of this information is governed by the Terms of Use, available at https://www.woltersAboutMyStaruwer.com/en/know/clinical-effectiveness-terms. 2024© UpToDate, Inc. and its affiliates and/or licensors. All rights reserved.  Copyright   © 2024 UpToDate, Inc. and/or its affiliates. All rights reserved.    Patient Education     High cholesterol   The Basics   Written by the doctors and  "editors at [employer]   What is cholesterol? -- Cholesterol is a substance found in blood. Everyone has some. It is needed for good health. But people sometimes have too much cholesterol.  Compared with people with normal cholesterol, people with high cholesterol have a higher risk of heart attack, stroke, and other health problems. The higher your cholesterol, the higher your risk of these problems.  Are there different types of cholesterol? -- Yes, there are a few different types. If you get a cholesterol test, you might hear your doctor or nurse talk about:   Total cholesterol   LDL cholesterol - Some people call this the \"bad\" cholesterol. That's because having high LDL levels raises your risk of heart attack, stroke, and other health problems.   HDL cholesterol - Some people call this the \"good\" cholesterol. That's because people with high HDL levels tend to have a lower risk of heart attack, stroke, and other health problems.   Non-HDL cholesterol - Non-HDL cholesterol is your total cholesterol minus your HDL cholesterol.   Triglycerides - Triglycerides are not cholesterol. They are another type of fat. But they often get measured when cholesterol is measured. (Having high triglycerides also seems to increase the risk of heart attack and stroke.)  What should my numbers be? -- Ask your doctor or nurse what your numbers should be. Different people need different goals. If you live outside of the [place], see the table (table 1).  In general, people who do not already have heart disease should aim for:   Total cholesterol below 200   LDL cholesterol below 130, or much lower if they are at risk of heart attack or stroke   HDL cholesterol above 60   Non-HDL cholesterol below 160, or lower if they are at risk of heart attack or stroke   Triglycerides below 150  Remember, though, that many people who cannot meet these goals still have a low risk of heart attack and stroke.  What should I do if I have high " "cholesterol? -- Ask your doctor what your overall risk of heart attack and stroke is. Just having high cholesterol is not always a reason to worry. Having high cholesterol is just one of many things that can increase your risk of heart attack and stroke.  Other things that increase your risk include:   Smoking   High blood pressure   Having a parent or sibling who got heart disease at a young age - Young, in this case, means younger than 55 for males and younger than 65 for females.   A diet that is not heart healthy - A \"heart-healthy\" diet includes lots of fruits and vegetables, fiber, and healthy fats (like those found in fish, nuts, and certain oils). It also means limiting sugar and unhealthy fats.   Older age  If you are at high risk of heart attack and stroke, having high cholesterol is a problem. But if you are at low risk, high cholesterol might not need treatment.  Should I take medicine to lower cholesterol? -- Not everyone who has high cholesterol needs medicines. Your doctor or nurse will decide if you need them based on your age, family history, and other health concerns.  There are many different medicines used to lower cholesterol (table 2). Some help your body make less cholesterol. Some keep your body from absorbing cholesterol from foods. Some help your body get rid of cholesterol faster. The medicines most often used to treat high cholesterol are called \"statins.\"  You should probably take a statin if you:   Already had a heart attack or stroke   Have known heart disease   Have diabetes   Have a condition called \"peripheral artery disease,\" which makes it painful to walk, and happens when the arteries in your legs get clogged with fatty deposits   Have an \"abdominal aortic aneurysm,\" which is a widening of the main artery in the belly  Most people with any of the conditions listed above should take a statin no matter what their cholesterol level is. If your doctor or nurse prescribes a statin, " "it's important to keep taking it. The medicine might not make you feel any different. But it can help prevent heart attack, stroke, and death.  If your doctor or nurse recommends taking medicine to help lower your cholesterol, make sure that you know what it is called. Follow all the instructions for how to take it. For example, some medicines work better when you take them in the evening. Some need to be taken with food.  Tell your doctor or nurse if your medicine causes any side effects that bother you. They might be able to switch you to a different medicine.  Can I lower my cholesterol without medicines? -- Yes. You can help lower your cholesterol by doing these things:   You can lower your LDL, or \"bad,\" cholesterol by avoiding red meat, butter, fried foods, cheese, and other foods that have a lot of saturated fat.   You can lower triglycerides by avoiding sugary foods, fried foods, and excess alcohol.   If you have excess weight, it can help to lose weight. Your doctor or nurse can help you do this in a healthy way.   Try to get regular physical activity. Even gentle forms of exercise, like walking, are good for your health.  Even if these steps don't change your cholesterol very much, they can improve your health in many other ways.  All topics are updated as new evidence becomes available and our peer review process is complete.  This topic retrieved from [employer] on: Mar 01, 2024.  Topic 14807 Version 25.0  Release: 32.2.4 - C32.59  © 2024 UpToDate, Inc. and/or its affiliates. All rights reserved.  table 1: Cholesterol and triglyceride measurements in the [place] and elsewhere     Measurement used within the [place] Milligrams/deciliter (mg/dL)  Measurement used most places outside of the [place] Millimoles/liter (mmol/Liter)     Level to aim for  Level to aim for    Total cholesterol  Below 200 Below 5.17   LDL cholesterol  Below 130, or much lower if at risk of heart attack and stroke Below 3.36, or much lower if at " risk of heart attack and stroke   HDL cholesterol  Above 60 Above 1.55   Triglycerides  Below 150 Below 1.7   Cholesterol is measured differently in the [place] than it is in most other countries. This table shows values used within and outside of the [place]. It includes the cholesterol and triglyceride levels that most people who do not have heart disease should aim for.  Graphic 01528 Version 5.0  table 2: Lipid-lowering medicines  Generic name  Brand name    Statins    Atorvastatin Lipitor   Fluvastatin Lescol, Lescol XL   Lovastatin Mevacor, Altoprev   Pitavastatin Livalo   Pravastatin Pravachol   Rosuvastatin Crestor   Simvastatin Zocor   PCSK9 inhibitors    Alirocumab Praluent   Evolocumab Repatha, Repatha SureClick   Cholesterol absorption inhibitors    Ezetimibe Zetia   Bile acid sequestrants    Cholestyramine Prevalite, Questran, Questran Light   Colesevelam Welchol   Colestipol Colestid   Niacin (nicotinic acid)    Niacin immediate release     Niacin extended release Niaspan   Fibrates    Fenofibrate Fenoglide, Tricor, Triglide, others   Gemfibrozil Lopid   Brand names listed are for medicines available in the [place] and some other countries.  Graphic 14829 Version 7.0  Consumer Information Use and Disclaimer   Disclaimer: This generalized information is a limited summary of diagnosis, treatment, and/or medication information. It is not meant to be comprehensive and should be used as a tool to help the user understand and/or assess potential diagnostic and treatment options. It does NOT include all information about conditions, treatments, medications, side effects, or risks that may apply to a specific patient. It is not intended to be medical advice or a substitute for the medical advice, diagnosis, or treatment of a health care provider based on the health care provider's examination and assessment of a patient's specific and unique circumstances. Patients must speak with a health care provider for complete  information about their health, medical questions, and treatment options, including any risks or benefits regarding use of medications. This information does not endorse any treatments or medications as safe, effective, or approved for treating a specific patient. UpToDate, Inc. and its affiliates disclaim any warranty or liability relating to this information or the use thereof.The use of this information is governed by the Terms of Use, available at https://www.Vissuwer.com/en/know/clinical-effectiveness-terms. 2024© UpToDate, Inc. and its affiliates and/or licensors. All rights reserved.  Copyright   © 2024 UpToDate, Inc. and/or its affiliates. All rights reserved.    Osteoporosis Education   Osteoporosis  is a long-term medical condition that causes your bones to become weak, brittle, and more likely to fracture. Osteoporosis occurs when your body absorbs more bone than it makes. It is also caused by a lack of calcium and estrogen (female hormone).  Common symptoms include the following:  You may not have any signs or symptoms. You may break a bone after a muscle strain, bump, or fall. A break usually occurs in the hip, spine, or wrist. A collapsed vertebra (bone in your spine) may cause severe back pain or loss of height from bent posture.  Call your doctor if:    You have severe pain.   You have increasing pain after a fall.   You have pain when you do your daily activities.   You have questions or concerns about your condition or care.  Diagnosis of osteoporosis:   Blood and urine tests  measure your calcium, vitamin D, and estrogen levels.    An x-ray or CT may show thinned bones or a fracture. You may be given contrast liquid to help the bones show up better in the pictures. Tell the healthcare provider if you have ever had an allergic reaction to contrast liquid. Do not enter the MRI room with anything metal. Metal can cause serious injury. Tell the healthcare provider if you have any metal in or on  your body.    A bone density test  compares your bone thickness with what is expected for someone of your age, gender, and ethnicity.  Treatment for osteoporosis may include medicines to prevent bone loss, build new bone, and increase estrogen. These medicines help prevent fractures and may be given as a pill or injection. Ask your healthcare provider for more information on these medicines.  Prevent bone loss:  Eat healthy foods that are high in calcium.  This helps keep your bones strong. Good sources of calcium are milk, cheese, broccoli, tofu, almonds, and canned salmon and sardines. Recommended to get at least 1200mg daily of calcium.  Increase your vitamin D intake.  Vitamin D is in fish oils, some vegetables, and fortified milk, cereal, and bread. Vitamin D is also formed in the skin when it is exposed to the sun. Ask your healthcare provider how much sunlight is safe for you. You will require at least 800 units of vitamin D daily taken as a supplement.  Drink liquids as directed.  Ask your healthcare provider how much liquid to drink each day and which liquids are best for you. Do not have alcohol or caffeine. They decrease bone mineral density, which can weaken your bones.  Exercise regularly.  Ask your healthcare provider about the best exercise plan for you. Weight bearing exercise for 30 minutes, 3 times a week can help build and strengthen bone.  Do not smoke.  Nicotine and other chemicals in cigarettes and cigars can cause lung damage. Ask your healthcare provider for information if you currently smoke and need help to quit. E-cigarettes or smokeless tobacco still contain nicotine. Talk to your healthcare provider before you use these products.  Go to physical therapy as directed.  A physical therapist teaches you exercises to help improve movement and muscle strength.  Alcohol. It is recommended to avoid heavy alcohol use as increased consumption of alcohol is known to cause bone loss  © Copyright [employer]  "[employer] 2021 Information is for End User's use only and may not be sold, redistributed or otherwise used for commercial purposes. All illustrations and images included in CareNotes® are the copyrighted property of [employer] or [employer]    Medicines for Osteoporosis (The Basics)    What do osteoporosis medicines do?  If you have osteoporosis or a high risk of breaking a bone, the medicines your doctor prescribes can:  Reduce bone loss  Increase bone density or keep it about the same  Reduce the chances that you will break a bone    For the medicines to work, you must also take calcium and vitamin D supplements.     Which medicines might I need?  There are many different osteoporosis medicines. Your doctor will work with you to choose the best one for you.    Bisphosphonates  Most people being treated for osteoporosis take these medicines first. If they do not work well enough or cause side effects that are too hard to handle, there are other options.    Bisphosphonates come in a pill or a shot. Most people take one pill every week. If your doctor prescribes a bisphosphonate pill, you must take the medicine exactly as directed. If you don't, the medicine can irritate your throat or stomach. For most bisphosphonate pills, you must:    Take the pill first thing in the morning, before you have anything to eat or drink.  Drink an 8-ounce glass of water with the pill, but not eat or drink anything else for 30 minutes or 1 hour (depending on which pill you take).  Avoid lying down for 30 minutes after taking the pill. You must sit or stand during that time.    There is one bisphosphonate pill, delayed release risedronate (brand name: Atelvia), that is taken in a different way from the others. It is taken after breakfast with 4 ounces of water.    \"Estrogen-like\" medicines  Medicines called selective estrogen receptor modifiers (or \"SERMs\") act like the hormone \"estrogen.\" Estrogen helps prevent bone loss. " "After menopause, a woman's body has less estrogen. (Menopause is the time when a woman stops having periods.) SERMs can act like estrogen to stop bone loss. Some of them also reduce the risk of breast cancer in women at high risk. SERMs are only for women who have gone through menopause.    Hormone medicines  These medicines are sometimes called \"hormone replacement therapy\" or \"menopausal hormone therapy\" (MHT). After menopause, a woman's body has lower levels of certain hormones. Some women take MHT to replace these hormones. MHT can also protect against osteoporosis.    Hormones are not used often to treat osteoporosis in women who have gone through menopause. This is because other medicines usually work much better. But MHT can help women who have bothersome symptoms related to menopause (such as hot flashes) and who have osteoporosis but cannot take other osteoporosis medicines.    Women who have not gone through menopause might take hormones in birth control pills or a patch to prevent osteoporosis.    PTH or PTHrP analog  Both of these are artificial forms of hormones the body makes naturally. PTH stands for \"parathyroid hormone,\" and PTHrP stands for \"parathyroid hormone-related protein.\" Both tell the body to make new bone. They are usually only for people with severe osteoporosis.    Romosozumab (Evenity)  Romosozumab is a medicine that blocks a protein in the body. This protein usually stops new bone from being formed. Blocking the protein allows the body to make new bone. Romosozumab is usually only for people with severe osteoporosis.    Denosumab (Prolia)  Denosumab blocks a different protein in the body. This protein usually causes bone to break down. By blocking the protein, denosumab reduces bone loss and the chance of breaking a bone. If other osteoporosis medicines cause bad side effects or do not help, your doctor might give you denosumab. It might also be a good choice for people with kidney " problems. When you stop taking denosumab, your bone density goes down again very quickly. Some people might be at higher risk for breaking a bone when this happens. If you stop denosumab, your doctor will prescribe a different osteoporosis medicine to prevent rapid bone loss.    Calcitonin  Calcitonin is a hormone the body makes naturally. Doctors can give a man-made form to treat osteoporosis. It is not used as often as other medicines because it does not work as well. But it can help relieve pain from broken bones in the spine. When used for broken bones in the spine, calcitonin should only be used until the pain is better (and not longer than 6 months). If you are put on calcitonin, after 6 months you should switch to a medicine that is better at preventing fractures.    How long do I need to take osteoporosis medicines?  If you are at high risk for breaking a bone, you can safely take osteoporosis medicines for many years. If you are not at high risk for breaking a bone, you might be able to stop your medicine for a year or more. Your doctor will check your bone density to make sure you are not losing too much bone. If you do stop the medicine, you will probably need to start it again later.    What else should I know about medicines for osteoporosis?  Some people have heard that taking bisphosphonates for a long time can increase the risk of breaking certain bones. This is true, but it happens very rarely. Your chances of breaking a bone from osteoporosis are much higher than your chances of breaking one because you take bisphosphonates.    If you take osteoporosis medicines, your doctor will do regular exams and tests to see how well the medicines are working. If they are not working well, you might need a different medicine.    ©2021 [employer] All rights reserved.

## 2025-07-09 NOTE — ASSESSMENT & PLAN NOTE
Stable.  Recommend lifestyle modifications.  Patient declines weight management and GLP-1 agonist.       Orders:    Vitamin D 25 hydroxy; Future

## 2025-07-09 NOTE — PROGRESS NOTES
Assessment/Plan:  Assessment & Plan  Annual physical exam  Health Maintenance   Topic Date Due    Breast Cancer Screening: Mammogram  04/24/2025    Annual Physical  06/25/2025    Influenza Vaccine (1) 09/01/2025    COVID-19 Vaccine (3 - 2024-25 season) 10/09/2025 (Originally 9/1/2024)    Zoster Vaccine (1 of 2) 01/07/2026 (Originally 1/17/2023)    DTaP,Tdap,and Td Vaccines (1 - Tdap) 01/07/2026 (Originally 1/17/1994)    Osteoporosis Screening  07/09/2026 (Originally 1/17/2023)    Pneumococcal Vaccine: 50+ Years (1 of 2 - PCV) 07/09/2026 (Originally 1/17/1992)    Colorectal Cancer Screening  08/10/2025    Depression Screening  07/09/2026    Cervical Cancer Screening  03/26/2029    HIV Screening  Completed    Hepatitis C Screening  Completed    Meningococcal B Vaccine  Aged Out    RSV Vaccine age 0-20 Months  Aged Out    HIB Vaccine  Aged Out    IPV Vaccine  Aged Out    Hepatitis A Vaccine  Aged Out    Meningococcal ACWY Vaccine  Aged Out    HPV Vaccine  Aged Out            IFG (impaired fasting glucose)  Stable.  Patient declines Metformin XR, GLP-1 agonist, and Pre-DM education.  Recommend lifestyle modifications.               Orders:    Comprehensive metabolic panel; Future    Hemoglobin A1C; Future     Primary hypertension    Elevated BP today in office, with stable BP readings at home.  Continue Chlorthalidone 25mg QD and Losartan 100mg QD.  Check blood pressure outside of office.  Recommend lifestyle modifications.       Orders:    Comprehensive metabolic panel; Future    Magnesium; Future    chlorthalidone 25 mg tablet; Take 1 tablet (25 mg total) by mouth daily    losartan (COZAAR) 100 MG tablet; Take 1 tablet (100 mg total) by mouth daily     Other hyperlipidemia      Stable s statin.  Recommend lifestyle modifications.     The 10-year ASCVD risk score (Clifton KAUR, et al., 2019) is: 1.7%    Values used to calculate the score:      Age: 50 years      Clincally relevant sex: Female      Is Non-   American: No      Diabetic: No      Tobacco smoker: No      Systolic Blood Pressure: 130 mmHg      Is BP treated: Yes      HDL Cholesterol: 85 mg/dL      Total Cholesterol: 255 mg/dL        Orders:    Comprehensive metabolic panel; Future    Lipid panel; Future    TSH, 3rd generation with Free T4 reflex; Future    LDL cholesterol, direct; Future     Closed fracture of medial portion of left tibial plateau, initial encounter  Management per Ortho.  Pending Dexa and osteoporosis labs.    Orders:    DXA bone density spine hip and pelvis; Future    Phosphorus; Future    PTH, intact; Future    Vitamin D deficiency    Stable. Continue MVI and OTC Vitamin D 2,000IU daily.       Orders:    Vitamin D 25 hydroxy; Future     Seasonal allergic rhinitis, unspecified trigger    Stable on Claritin 10 mg daily p.r.n..  Avoid decongestants.            OAB (overactive bladder)    Stable without meds.  Management per Gyn.                 Hepatomegaly    Management per GI. F/U PRN as W/U negative. Recommend lifestyle modifications.       Orders:    Comprehensive metabolic panel; Future     Fatty liver    Management per GI. F/U PRN as W/U negative. Recommend lifestyle modifications.       Orders:    Comprehensive metabolic panel; Future     Mitral valve insufficiency, unspecified etiology    Stable. Management per Cardio - Overdue for appt.             Tricuspid valve insufficiency, unspecified etiology    Stable. Management per Cardio - Overdue for appt.             Acute tear of posterior horn of medial meniscus  Management per Ortho.         Class 1 obesity with serious comorbidity and body mass index (BMI) of 31.0 to 31.9 in adult, unspecified obesity type    Stable.  Recommend lifestyle modifications.  Patient declines weight management and GLP-1 agonist.       Orders:    Vitamin D 25 hydroxy; Future     Obesity (BMI 30.0-34.9)  Stable.  Recommend lifestyle modifications.      Orders:    Vitamin D 25 hydroxy; Future    BMI  31.0-31.9,adult  Stable.  Recommend lifestyle modifications.  Orders:    Vitamin D 25 hydroxy; Future    Asymptomatic postmenopausal state    Orders:    DXA bone density spine hip and pelvis; Future    History of fragility fracture    Orders:    DXA bone density spine hip and pelvis; Future    Phosphorus; Future    PTH, intact; Future          Return in about 6 months (around 1/9/2026) for 6mo - IFG, HTN, HL, Vit D Def, Labs; PRN Shingrix #1, 2mo later Shingrix #2..      Future Appointments   Date Time Provider Department Center   7/30/2025  3:15 PM Shun Gage DO [facility]   8/15/2025  8:30 AM AYSE Mayorga [facility]   1/13/2026  8:00 AM Azalia Finn DO [facility]        Subjective:     Sarah is a 50 y.o. female who presents today for a follow-up on her chronic medical conditions.        HPI:  Chief Complaint   Patient presents with    Physical Exam    Follow-up     Physical / 6mo - IFG, HTN, HL, Vit D Def, Labs; PRN Shingrix #1, 2mo later Shingrix #2.     -- Above per clinical staff and reviewed. --      HPI      Today:      Return in about 6 months (around 7/7/2025) for Physical / 6mo - IFG, HTN, HL, Vit D Def, Labs; PRN Shingrix #1, 2mo later Shingrix #2.     6mo OV         Obesity - Watching diet.  She not adding salt to food.  Does intermittent fasting during the work week.  +Exercise - Gym -  / Weights / Cardio / Class for 1 hour, 6 times per week.     IFG - Declines Metformin XR or GLP1A previously.         HTN -  Management per Cardio Dr. He Mckeon.  Next appt 1/24 - Overdue.  On Losartan 100mg QD and Chlorthalidone 25mg QD.  BP check outside of office on arm cuff 125/68.     Hyperlipidemia - No statin previously.       Vitamin D Deficiency - s/p Drisdol.  Taking MVI, and OTC Vitamin D 2,000IU daily.        RUQ Abdominal Pain / Fatty Liver - Management per GI Dr. Aureliano Mckeon.  Next appt PRN.  s/p RUQ U/S on 3/9/22 - stable  gallbladder. Negative NAVA Fibrosure and abdominal ultrasound elastography with fat quantification (UGAP) - no hepatic fibrosis.  Referred to Weight Management.  She defers as she is working  with the  / nutritionist at the gym, which is more affordable.  Work-up negative for fibrosis.       No Left Atrial Dilation (Measurement Error per Cardio) / Mitral Regurgitation / Tricuspid Regurgitation - Management per Cardio Dr. He Mckeon.  Next appt 1/24 - Overdue.  Pending repeat Echo when HTN is controlled.        OAB - Management per Gyn AYSE Wynne.  Next appt 8/25.  No meds.       AR - Stable on Claritin PRN.        Left knee posterior medial meniscus root tear with associated subchondral fracture medial tibial plateau - Management per Ortho Dr. Gage - Next appt 7/25.  Occurred while walking on a treadmill.  Awaiting knee brace 7/9/25.          Reviewed:  Labs 6/28/25, Gyn 3/26/24, GI 8/4/22, Cardio 1/10/23     Sees Gyn MsAYSE Hernandez at [facility] yearly.  Next appt 8/25.      Declines Shingrix, Tdap 6/20/23.    Sees Dentist q6 months.  Sees Optho q2 years.      PHQ-2/9 Depression Screening    Little interest or pleasure in doing things: 0 - not at all  Feeling down, depressed, or hopeless: 0 - not at all  PHQ-2 Score: 0  PHQ-2 Interpretation: Negative depression screen             The following portions of the patient's history were reviewed and updated as appropriate: allergies, current medications, past family history, past medical history, past social history, past surgical history and problem list.      Review of Systems   Constitutional:  Negative for appetite change, chills, diaphoresis, fatigue and fever.   Respiratory:  Negative for chest tightness and shortness of breath.    Cardiovascular:  Negative for chest pain.   Gastrointestinal:  Negative for abdominal pain, blood in stool, diarrhea, nausea and vomiting.   Genitourinary:   "Negative for dysuria.        Current Outpatient Medications   Medication Sig Dispense Refill    chlorthalidone 25 mg tablet Take 1 tablet (25 mg total) by mouth daily 90 tablet 1    Diclofenac Sodium (VOLTAREN) 1 % Apply 2 g topically 4 (four) times a day as needed (pain) 50 g 0    GLUCOSAMINE-VITAMIN D PO Take 2 tablets by mouth in the morning Vitamin D 2000IU in 2 tabs      loratadine (CLARITIN) 10 mg tablet Take 10 mg by mouth daily as needed      losartan (COZAAR) 100 MG tablet Take 1 tablet (100 mg total) by mouth daily 90 tablet 1    Multiple Vitamins-Minerals (WOMENS MULTIVITAMIN PO) Take 1 tablet by mouth in the morning       No current facility-administered medications for this visit.       Objective:  /70   Pulse 60   Temp 98.2 °F (36.8 °C) (Temporal)   Ht 5' 5\" (1.651 m)   Wt 86.4 kg (190 lb 6.4 oz)   LMP 09/24/2021 (Exact Date)   SpO2 100%   Breastfeeding No   BMI 31.68 kg/m²    Wt Readings from Last 3 Encounters:   07/09/25 86.4 kg (190 lb 6.4 oz)   06/11/25 86.2 kg (190 lb)   05/28/25 86.2 kg (190 lb)      BP Readings from Last 3 Encounters:   07/09/25 130/70   05/25/25 162/82   01/07/25 146/74          Physical Exam  Vitals and nursing note reviewed.   Constitutional:       General: She is not in acute distress.     Appearance: Normal appearance. She is well-developed. She is obese. She is not ill-appearing or toxic-appearing.   HENT:      Head: Normocephalic and atraumatic.      Right Ear: Tympanic membrane, ear canal and external ear normal.      Left Ear: Tympanic membrane, ear canal and external ear normal.      Nose: Nose normal.      Right Sinus: No maxillary sinus tenderness or frontal sinus tenderness.      Left Sinus: No maxillary sinus tenderness or frontal sinus tenderness.      Mouth/Throat:      Mouth: Mucous membranes are moist.      Pharynx: Uvula midline.      Tonsils: No tonsillar exudate.     Eyes:      Extraocular Movements: Extraocular movements intact.      " "Conjunctiva/sclera: Conjunctivae normal.       Cardiovascular:      Rate and Rhythm: Normal rate and regular rhythm.      Heart sounds: Murmur heard.      Systolic murmur is present with a grade of 2/6.   Pulmonary:      Effort: Pulmonary effort is normal.      Breath sounds: Normal breath sounds.   Abdominal:      General: Bowel sounds are normal. There is no distension.      Palpations: Abdomen is soft. There is no mass.      Tenderness: There is no abdominal tenderness. There is no guarding or rebound.     Musculoskeletal:         General: No swelling or tenderness.      Cervical back: Neck supple.      Right lower leg: No edema.      Left lower leg: No edema.   Lymphadenopathy:      Cervical: No cervical adenopathy.     Skin:     Findings: No rash.     Neurological:      General: No focal deficit present.      Mental Status: She is alert and oriented to person, place, and time. Mental status is at baseline.     Psychiatric:         Mood and Affect: Mood normal.         Behavior: Behavior normal.         Thought Content: Thought content normal.         Judgment: Judgment normal.         Lab Results:      Lab Results   Component Value Date    WBC 7.3 06/28/2025    HGB 12.0 06/28/2025    HCT 37.1 06/28/2025     06/28/2025    TRIG 50 06/28/2025    HDL 85 06/28/2025    LDLDIRECT 172 (H) 06/28/2025    ALT 14 06/28/2025    AST 17 06/28/2025    K 4.2 06/28/2025     06/28/2025    CREATININE 0.62 06/28/2025    BUN 22 06/28/2025    CO2 28 06/28/2025    GLUF 119 (H) 07/30/2022    HGBA1C 5.5 06/28/2025     No results found for: \"URICACID\"  Invalid input(s): \"BASENAME\" Vitamin D    No results found.     POCT Labs        Depression Screening and Follow-up Plan: Patient was screened for depression during today's encounter. They screened negative with a PHQ-2 score of 0.                      "

## 2025-07-09 NOTE — ASSESSMENT & PLAN NOTE
Management per GI. F/U PRN as W/U negative. Recommend lifestyle modifications.       Orders:    Comprehensive metabolic panel; Future

## 2025-07-09 NOTE — ASSESSMENT & PLAN NOTE
Stable s statin.  Recommend lifestyle modifications.     The 10-year ASCVD risk score (Clifton KAUR, et al., 2019) is: 1.7%    Values used to calculate the score:      Age: 50 years      Clincally relevant sex: Female      Is Non- : No      Diabetic: No      Tobacco smoker: No      Systolic Blood Pressure: 130 mmHg      Is BP treated: Yes      HDL Cholesterol: 85 mg/dL      Total Cholesterol: 255 mg/dL        Orders:    Comprehensive metabolic panel; Future    Lipid panel; Future    TSH, 3rd generation with Free T4 reflex; Future    LDL cholesterol, direct; Future

## 2025-07-21 ENCOUNTER — HOSPITAL ENCOUNTER (OUTPATIENT)
Dept: RADIOLOGY | Facility: MEDICAL CENTER | Age: 50
Discharge: HOME | End: 2025-07-21
Attending: FAMILY MEDICINE
Payer: COMMERCIAL

## 2025-07-21 DIAGNOSIS — Z87.311 HISTORY OF FRAGILITY FRACTURE: ICD-10-CM

## 2025-07-21 DIAGNOSIS — Z78.0 ASYMPTOMATIC POSTMENOPAUSAL STATE: ICD-10-CM

## 2025-07-21 DIAGNOSIS — S82.132A CLOSED FRACTURE OF MEDIAL PORTION OF LEFT TIBIAL PLATEAU, INITIAL ENCOUNTER: ICD-10-CM

## 2025-07-21 PROCEDURE — 77080 DXA BONE DENSITY AXIAL: CPT

## 2025-07-22 ENCOUNTER — RESULTS FOLLOW-UP (OUTPATIENT)
Dept: FAMILY MEDICINE CLINIC | Facility: CLINIC | Age: 50
End: 2025-07-22

## 2025-07-22 LAB
PHOSPHATE SERPL-MCNC: 3.7 MG/DL (ref 2.5–4.5)
PTH-INTACT SERPL-MCNC: 27 PG/ML (ref 16–77)

## 2025-07-30 ENCOUNTER — OFFICE VISIT (OUTPATIENT)
Dept: OBGYN CLINIC | Facility: CLINIC | Age: 50
End: 2025-07-30
Payer: COMMERCIAL

## 2025-07-30 ENCOUNTER — APPOINTMENT (OUTPATIENT)
Dept: RADIOLOGY | Facility: CLINIC | Age: 50
End: 2025-07-30
Attending: ORTHOPAEDIC SURGERY
Payer: COMMERCIAL

## 2025-07-30 VITALS — WEIGHT: 190 LBS | HEIGHT: 65 IN | BODY MASS INDEX: 31.65 KG/M2

## 2025-07-30 DIAGNOSIS — S82.132D CLOSED FRACTURE OF MEDIAL PORTION OF LEFT TIBIAL PLATEAU WITH ROUTINE HEALING, SUBSEQUENT ENCOUNTER: ICD-10-CM

## 2025-07-30 DIAGNOSIS — S82.132A CLOSED FRACTURE OF MEDIAL PORTION OF LEFT TIBIAL PLATEAU, INITIAL ENCOUNTER: ICD-10-CM

## 2025-07-30 DIAGNOSIS — S83.242D OTHER TEAR OF MEDIAL MENISCUS OF LEFT KNEE AS CURRENT INJURY, SUBSEQUENT ENCOUNTER: Primary | ICD-10-CM

## 2025-07-30 PROCEDURE — 73564 X-RAY EXAM KNEE 4 OR MORE: CPT

## 2025-07-30 PROCEDURE — 99214 OFFICE O/P EST MOD 30 MIN: CPT | Performed by: ORTHOPAEDIC SURGERY

## 2025-08-12 ENCOUNTER — DOCUMENTATION (OUTPATIENT)
Dept: ADMINISTRATIVE | Facility: OTHER | Age: 50
End: 2025-08-12

## 2025-08-15 ENCOUNTER — ANNUAL EXAM (OUTPATIENT)
Dept: GYNECOLOGY | Facility: CLINIC | Age: 50
End: 2025-08-15
Payer: COMMERCIAL